# Patient Record
Sex: FEMALE | Race: WHITE | NOT HISPANIC OR LATINO | Employment: OTHER | URBAN - METROPOLITAN AREA
[De-identification: names, ages, dates, MRNs, and addresses within clinical notes are randomized per-mention and may not be internally consistent; named-entity substitution may affect disease eponyms.]

---

## 2017-05-12 ENCOUNTER — CONVERSION ENCOUNTER (OUTPATIENT)
Dept: OTHER | Facility: HOSPITAL | Age: 57
End: 2017-05-12

## 2018-02-15 ENCOUNTER — OFFICE VISIT CONVERTED (OUTPATIENT)
Dept: FAMILY MEDICINE CLINIC | Age: 58
End: 2018-02-15
Attending: NURSE PRACTITIONER

## 2018-05-18 ENCOUNTER — APPOINTMENT (OUTPATIENT)
Dept: WOMENS IMAGING | Facility: HOSPITAL | Age: 58
End: 2018-05-18

## 2018-05-18 PROCEDURE — 77067 SCR MAMMO BI INCL CAD: CPT | Performed by: RADIOLOGY

## 2018-05-18 PROCEDURE — 77063 BREAST TOMOSYNTHESIS BI: CPT | Performed by: RADIOLOGY

## 2018-06-01 ENCOUNTER — OFFICE VISIT CONVERTED (OUTPATIENT)
Dept: FAMILY MEDICINE CLINIC | Age: 58
End: 2018-06-01
Attending: NURSE PRACTITIONER

## 2018-06-09 ENCOUNTER — OFFICE VISIT CONVERTED (OUTPATIENT)
Dept: FAMILY MEDICINE CLINIC | Age: 58
End: 2018-06-09
Attending: NURSE PRACTITIONER

## 2018-09-04 ENCOUNTER — OFFICE VISIT CONVERTED (OUTPATIENT)
Dept: FAMILY MEDICINE CLINIC | Age: 58
End: 2018-09-04
Attending: NURSE PRACTITIONER

## 2019-01-09 ENCOUNTER — HOSPITAL ENCOUNTER (OUTPATIENT)
Dept: PHYSICAL THERAPY | Facility: CLINIC | Age: 59
Setting detail: RECURRING SERIES
Discharge: HOME | End: 2019-01-09
Attending: ORTHOPAEDIC SURGERY

## 2019-03-04 ENCOUNTER — OFFICE VISIT CONVERTED (OUTPATIENT)
Dept: FAMILY MEDICINE CLINIC | Age: 59
End: 2019-03-04
Attending: NURSE PRACTITIONER

## 2019-03-11 ENCOUNTER — HOSPITAL ENCOUNTER (OUTPATIENT)
Dept: OTHER | Facility: HOSPITAL | Age: 59
Discharge: HOME | End: 2019-03-11

## 2019-03-11 LAB
ALBUMIN SERPL-MCNC: 3.9 G/DL (ref 3.5–5)
ALBUMIN/GLOB SERPL: 1.6 {RATIO} (ref 1.4–2.6)
ALP SERPL-CCNC: 74 U/L (ref 53–141)
ALT SERPL-CCNC: 20 U/L (ref 10–40)
ANION GAP SERPL CALC-SCNC: 13 MMOL/L (ref 8–19)
AST SERPL-CCNC: 18 U/L (ref 15–50)
BASOPHILS # BLD MANUAL: 0.05 10*3/UL (ref 0–0.2)
BASOPHILS NFR BLD MANUAL: 1.1 % (ref 0–3)
BILIRUB SERPL-MCNC: 0.18 MG/DL (ref 0.2–1.3)
BUN SERPL-MCNC: 11 MG/DL (ref 5–25)
BUN/CREAT SERPL: 12 {RATIO} (ref 6–20)
CALCIUM SERPL-MCNC: 9.1 MG/DL (ref 8.7–10.4)
CHLORIDE SERPL-SCNC: 105 MMOL/L (ref 99–111)
CHOLEST SERPL-MCNC: 221 MG/DL (ref 107–200)
CHOLEST/HDLC SERPL: 3.6 {RATIO} (ref 3–6)
CONV CO2: 25 MMOL/L (ref 22–32)
CONV TOTAL PROTEIN: 6.3 G/DL (ref 6.3–8.2)
CREAT UR-MCNC: 0.94 MG/DL (ref 0.5–0.9)
DEPRECATED RDW RBC AUTO: 44.2 FL
EOSINOPHIL # BLD MANUAL: 0.18 10*3/UL (ref 0–0.7)
EOSINOPHIL NFR BLD MANUAL: 4 % (ref 0–7)
ERYTHROCYTE [DISTWIDTH] IN BLOOD BY AUTOMATED COUNT: 12.9 % (ref 11.5–14.5)
GFR SERPLBLD BASED ON 1.73 SQ M-ARVRAT: >60 ML/MIN/{1.73_M2}
GLOBULIN UR ELPH-MCNC: 2.4 G/DL (ref 2–3.5)
GLUCOSE SERPL-MCNC: 80 MG/DL (ref 65–99)
GRANS (ABSOLUTE): 2.01 10*3/UL (ref 2–8)
GRANS: 45.2 % (ref 30–85)
HBA1C MFR BLD: 11.6 G/DL (ref 12–16)
HCT VFR BLD AUTO: 35.5 % (ref 37–47)
HDLC SERPL-MCNC: 61 MG/DL (ref 40–60)
IMM GRANULOCYTES # BLD: 0.02 10*3/UL (ref 0–0.54)
IMM GRANULOCYTES NFR BLD: 0.4 % (ref 0–0.43)
LDLC SERPL CALC-MCNC: 132 MG/DL (ref 70–100)
LYMPHOCYTES # BLD MANUAL: 1.87 10*3/UL (ref 1–5)
LYMPHOCYTES NFR BLD MANUAL: 7.4 % (ref 3–10)
MCH RBC QN AUTO: 30.1 PG (ref 27–31)
MCHC RBC AUTO-ENTMCNC: 32.7 G/DL (ref 33–37)
MCV RBC AUTO: 92 FL (ref 81–99)
MONOCYTES # BLD AUTO: 0.33 10*3/UL (ref 0.2–1.2)
OSMOLALITY SERPL CALC.SUM OF ELEC: 286 MOSM/KG (ref 273–304)
PLATELET # BLD AUTO: 383 10*3/UL (ref 130–400)
PMV BLD AUTO: 9.5 FL (ref 7.4–10.4)
POTASSIUM SERPL-SCNC: 4 MMOL/L (ref 3.5–5.3)
RBC # BLD AUTO: 3.86 10*6/UL (ref 4.2–5.4)
SODIUM SERPL-SCNC: 139 MMOL/L (ref 135–147)
T4 FREE SERPL-MCNC: 0.9 NG/DL (ref 0.9–1.8)
TRIGL SERPL-MCNC: 139 MG/DL (ref 40–150)
TSH SERPL-ACNC: 4.11 M[IU]/L (ref 0.27–4.2)
VARIANT LYMPHS NFR BLD MANUAL: 41.9 % (ref 20–45)
VLDLC SERPL-MCNC: 28 MG/DL (ref 5–37)
WBC # BLD AUTO: 4.46 10*3/UL (ref 4.8–10.8)

## 2019-03-12 LAB
CONV ANTI MICROSOMAL AB: 19 IU/ML (ref 0–34)
THYROGLOBULIN ANTIBODY: <1 IU/ML (ref 0–0.9)

## 2019-03-20 ENCOUNTER — HOSPITAL ENCOUNTER (OUTPATIENT)
Dept: OTHER | Facility: HOSPITAL | Age: 59
Discharge: HOME | End: 2019-03-20

## 2019-03-20 LAB
FERRITIN SERPL-MCNC: 63 NG/ML (ref 10–200)
IRON SATN MFR SERPL: 25 % (ref 20–55)
IRON SERPL-MCNC: 117 UG/DL (ref 60–170)
TIBC SERPL-MCNC: 475 UG/DL (ref 245–450)
TRANSFERRIN SERPL-MCNC: 332 MG/DL (ref 250–380)

## 2019-05-21 ENCOUNTER — APPOINTMENT (OUTPATIENT)
Dept: WOMENS IMAGING | Facility: HOSPITAL | Age: 59
End: 2019-05-21

## 2019-05-21 PROCEDURE — 77063 BREAST TOMOSYNTHESIS BI: CPT | Performed by: RADIOLOGY

## 2019-05-21 PROCEDURE — 77067 SCR MAMMO BI INCL CAD: CPT | Performed by: RADIOLOGY

## 2019-08-13 ENCOUNTER — OFFICE VISIT CONVERTED (OUTPATIENT)
Dept: FAMILY MEDICINE CLINIC | Age: 59
End: 2019-08-13
Attending: NURSE PRACTITIONER

## 2020-02-21 ENCOUNTER — OFFICE VISIT CONVERTED (OUTPATIENT)
Dept: FAMILY MEDICINE CLINIC | Age: 60
End: 2020-02-21
Attending: NURSE PRACTITIONER

## 2020-03-04 ENCOUNTER — HOSPITAL ENCOUNTER (OUTPATIENT)
Dept: OTHER | Facility: HOSPITAL | Age: 60
Discharge: HOME | End: 2020-03-04
Attending: NURSE PRACTITIONER

## 2020-03-04 LAB
ALBUMIN SERPL-MCNC: 4.2 G/DL (ref 3.5–5)
ALBUMIN/GLOB SERPL: 1.6 {RATIO} (ref 1.4–2.6)
ALP SERPL-CCNC: 90 U/L (ref 43–160)
ALT SERPL-CCNC: 24 U/L (ref 10–40)
ANION GAP SERPL CALC-SCNC: 19 MMOL/L (ref 8–19)
AST SERPL-CCNC: 23 U/L (ref 15–50)
BASOPHILS # BLD MANUAL: 0.02 10*3/UL (ref 0–0.2)
BASOPHILS NFR BLD MANUAL: 0.3 % (ref 0–3)
BILIRUB SERPL-MCNC: 0.17 MG/DL (ref 0.2–1.3)
BUN SERPL-MCNC: 11 MG/DL (ref 5–25)
BUN/CREAT SERPL: 12 {RATIO} (ref 6–20)
CALCIUM SERPL-MCNC: 9.7 MG/DL (ref 8.7–10.4)
CHLORIDE SERPL-SCNC: 94 MMOL/L (ref 99–111)
CHOLEST SERPL-MCNC: 218 MG/DL (ref 107–200)
CHOLEST/HDLC SERPL: 3.6 {RATIO} (ref 3–6)
CONV CO2: 23 MMOL/L (ref 22–32)
CONV TOTAL PROTEIN: 6.9 G/DL (ref 6.3–8.2)
CREAT UR-MCNC: 0.93 MG/DL (ref 0.5–0.9)
DEPRECATED RDW RBC AUTO: 39.6 FL
EOSINOPHIL # BLD MANUAL: 0.19 10*3/UL (ref 0–0.7)
EOSINOPHIL NFR BLD MANUAL: 3.2 % (ref 0–7)
ERYTHROCYTE [DISTWIDTH] IN BLOOD BY AUTOMATED COUNT: 12.5 % (ref 11.5–14.5)
FOLATE SERPL-MCNC: 15.7 NG/ML (ref 4.8–20)
GFR SERPLBLD BASED ON 1.73 SQ M-ARVRAT: >60 ML/MIN/{1.73_M2}
GLOBULIN UR ELPH-MCNC: 2.7 G/DL (ref 2–3.5)
GLUCOSE SERPL-MCNC: 92 MG/DL (ref 65–99)
GRANS (ABSOLUTE): 2.9 10*3/UL (ref 2–8)
GRANS: 49.6 % (ref 30–85)
HBA1C MFR BLD: 12.3 G/DL (ref 12–16)
HCT VFR BLD AUTO: 36.8 % (ref 37–47)
HDLC SERPL-MCNC: 60 MG/DL (ref 40–60)
IMM GRANULOCYTES # BLD: 0.01 10*3/UL (ref 0–0.54)
IMM GRANULOCYTES NFR BLD: 0.2 % (ref 0–0.43)
IRON SATN MFR SERPL: 14 % (ref 20–55)
IRON SERPL-MCNC: 74 UG/DL (ref 60–170)
LDLC SERPL CALC-MCNC: 130 MG/DL (ref 70–100)
LYMPHOCYTES # BLD MANUAL: 2.2 10*3/UL (ref 1–5)
LYMPHOCYTES NFR BLD MANUAL: 9.1 % (ref 3–10)
MCH RBC QN AUTO: 29.1 PG (ref 27–31)
MCHC RBC AUTO-ENTMCNC: 33.4 G/DL (ref 33–37)
MCV RBC AUTO: 87 FL (ref 81–99)
MONOCYTES # BLD AUTO: 0.53 10*3/UL (ref 0.2–1.2)
OSMOLALITY SERPL CALC.SUM OF ELEC: 273 MOSM/KG (ref 273–304)
PLATELET # BLD AUTO: 370 10*3/UL (ref 130–400)
PMV BLD AUTO: 9.5 FL (ref 7.4–10.4)
POTASSIUM SERPL-SCNC: 4 MMOL/L (ref 3.5–5.3)
RBC # BLD AUTO: 4.23 10*6/UL (ref 4.2–5.4)
SODIUM SERPL-SCNC: 132 MMOL/L (ref 135–147)
TIBC SERPL-MCNC: 523 UG/DL (ref 245–450)
TRANSFERRIN SERPL-MCNC: 366 MG/DL (ref 250–380)
TRIGL SERPL-MCNC: 139 MG/DL (ref 40–150)
VARIANT LYMPHS NFR BLD MANUAL: 37.6 % (ref 20–45)
VLDLC SERPL-MCNC: 28 MG/DL (ref 5–37)
WBC # BLD AUTO: 5.85 10*3/UL (ref 4.8–10.8)

## 2020-04-04 ENCOUNTER — OFFICE VISIT CONVERTED (OUTPATIENT)
Dept: FAMILY MEDICINE CLINIC | Age: 60
End: 2020-04-04
Attending: NURSE PRACTITIONER

## 2020-04-15 ENCOUNTER — HOSPITAL ENCOUNTER (OUTPATIENT)
Dept: OTHER | Facility: HOSPITAL | Age: 60
Discharge: HOME | End: 2020-04-15
Attending: NURSE PRACTITIONER

## 2020-04-15 LAB
ANION GAP SERPL CALC-SCNC: 16 MMOL/L (ref 8–19)
BUN SERPL-MCNC: 11 MG/DL (ref 5–25)
BUN/CREAT SERPL: 11 {RATIO} (ref 6–20)
CALCIUM SERPL-MCNC: 9.5 MG/DL (ref 8.7–10.4)
CHLORIDE SERPL-SCNC: 99 MMOL/L (ref 99–111)
CONV CO2: 25 MMOL/L (ref 22–32)
CREAT UR-MCNC: 0.98 MG/DL (ref 0.5–0.9)
GFR SERPLBLD BASED ON 1.73 SQ M-ARVRAT: >60 ML/MIN/{1.73_M2}
GLUCOSE SERPL-MCNC: 86 MG/DL (ref 65–99)
OSMOLALITY SERPL CALC.SUM OF ELEC: 281 MOSM/KG (ref 273–304)
POTASSIUM SERPL-SCNC: 4.3 MMOL/L (ref 3.5–5.3)
SODIUM SERPL-SCNC: 136 MMOL/L (ref 135–147)

## 2020-09-04 ENCOUNTER — OFFICE VISIT CONVERTED (OUTPATIENT)
Dept: FAMILY MEDICINE CLINIC | Age: 60
End: 2020-09-04
Attending: NURSE PRACTITIONER

## 2020-09-04 ENCOUNTER — HOSPITAL ENCOUNTER (OUTPATIENT)
Dept: OTHER | Facility: HOSPITAL | Age: 60
Discharge: HOME | End: 2020-09-04
Attending: NURSE PRACTITIONER

## 2020-09-15 ENCOUNTER — HOSPITAL ENCOUNTER (OUTPATIENT)
Dept: OTHER | Facility: HOSPITAL | Age: 60
Discharge: HOME | End: 2020-09-15
Attending: NURSE PRACTITIONER

## 2020-09-15 LAB
ALBUMIN SERPL-MCNC: 4.2 G/DL (ref 3.5–5)
ALBUMIN/GLOB SERPL: 1.6 {RATIO} (ref 1.4–2.6)
ALP SERPL-CCNC: 91 U/L (ref 43–160)
ALT SERPL-CCNC: 22 U/L (ref 10–40)
ANION GAP SERPL CALC-SCNC: 18 MMOL/L (ref 8–19)
AST SERPL-CCNC: 23 U/L (ref 15–50)
BILIRUB SERPL-MCNC: 0.39 MG/DL (ref 0.2–1.3)
BUN SERPL-MCNC: 12 MG/DL (ref 5–25)
BUN/CREAT SERPL: 13 {RATIO} (ref 6–20)
CALCIUM SERPL-MCNC: 9.3 MG/DL (ref 8.7–10.4)
CHLORIDE SERPL-SCNC: 97 MMOL/L (ref 99–111)
CHOLEST SERPL-MCNC: 229 MG/DL (ref 107–200)
CHOLEST/HDLC SERPL: 3.7 {RATIO} (ref 3–6)
CONV CO2: 24 MMOL/L (ref 22–32)
CONV TOTAL PROTEIN: 6.8 G/DL (ref 6.3–8.2)
CREAT UR-MCNC: 0.94 MG/DL (ref 0.5–0.9)
GFR SERPLBLD BASED ON 1.73 SQ M-ARVRAT: >60 ML/MIN/{1.73_M2}
GLOBULIN UR ELPH-MCNC: 2.6 G/DL (ref 2–3.5)
GLUCOSE SERPL-MCNC: 89 MG/DL (ref 65–99)
HDLC SERPL-MCNC: 62 MG/DL (ref 40–60)
LDLC SERPL CALC-MCNC: 143 MG/DL (ref 70–100)
OSMOLALITY SERPL CALC.SUM OF ELEC: 279 MOSM/KG (ref 273–304)
POTASSIUM SERPL-SCNC: 4.2 MMOL/L (ref 3.5–5.3)
SODIUM SERPL-SCNC: 135 MMOL/L (ref 135–147)
TRIGL SERPL-MCNC: 121 MG/DL (ref 40–150)
VLDLC SERPL-MCNC: 24 MG/DL (ref 5–37)

## 2020-12-23 ENCOUNTER — OFFICE VISIT CONVERTED (OUTPATIENT)
Dept: OTOLARYNGOLOGY | Facility: CLINIC | Age: 60
End: 2020-12-23
Attending: OTOLARYNGOLOGY

## 2021-01-05 ENCOUNTER — APPOINTMENT (OUTPATIENT)
Dept: WOMENS IMAGING | Facility: HOSPITAL | Age: 61
End: 2021-01-05

## 2021-01-05 PROCEDURE — 77067 SCR MAMMO BI INCL CAD: CPT | Performed by: RADIOLOGY

## 2021-01-05 PROCEDURE — 77063 BREAST TOMOSYNTHESIS BI: CPT | Performed by: RADIOLOGY

## 2021-03-10 ENCOUNTER — OFFICE VISIT CONVERTED (OUTPATIENT)
Dept: OTOLARYNGOLOGY | Facility: CLINIC | Age: 61
End: 2021-03-10
Attending: OTOLARYNGOLOGY

## 2021-03-11 ENCOUNTER — OFFICE VISIT CONVERTED (OUTPATIENT)
Dept: FAMILY MEDICINE CLINIC | Age: 61
End: 2021-03-11
Attending: NURSE PRACTITIONER

## 2021-03-22 ENCOUNTER — BULK ORDERING (OUTPATIENT)
Dept: CASE MANAGEMENT | Facility: OTHER | Age: 61
End: 2021-03-22

## 2021-03-22 DIAGNOSIS — Z23 IMMUNIZATION DUE: ICD-10-CM

## 2021-03-30 ENCOUNTER — HOSPITAL ENCOUNTER (OUTPATIENT)
Dept: OTHER | Facility: HOSPITAL | Age: 61
Discharge: HOME | End: 2021-03-30
Attending: NURSE PRACTITIONER

## 2021-03-30 LAB
ALBUMIN SERPL-MCNC: 4.3 G/DL (ref 3.5–5)
ALBUMIN/GLOB SERPL: 1.5 {RATIO} (ref 1.4–2.6)
ALP SERPL-CCNC: 95 U/L (ref 43–160)
ALT SERPL-CCNC: 21 U/L (ref 10–40)
ANION GAP SERPL CALC-SCNC: 14 MMOL/L (ref 8–19)
AST SERPL-CCNC: 21 U/L (ref 15–50)
BASOPHILS # BLD MANUAL: 0 10*3/UL (ref 0–0.2)
BASOPHILS NFR BLD MANUAL: 0 % (ref 0–3)
BILIRUB SERPL-MCNC: 0.31 MG/DL (ref 0.2–1.3)
BUN SERPL-MCNC: 11 MG/DL (ref 5–25)
BUN/CREAT SERPL: 12 {RATIO} (ref 6–20)
CALCIUM SERPL-MCNC: 9.4 MG/DL (ref 8.7–10.4)
CHLORIDE SERPL-SCNC: 96 MMOL/L (ref 99–111)
CHOLEST SERPL-MCNC: 225 MG/DL (ref 107–200)
CHOLEST/HDLC SERPL: 3.6 {RATIO} (ref 3–6)
CONV CO2: 27 MMOL/L (ref 22–32)
CONV TOTAL PROTEIN: 7.1 G/DL (ref 6.3–8.2)
CREAT UR-MCNC: 0.91 MG/DL (ref 0.5–0.9)
DEPRECATED RDW RBC AUTO: 40.4 FL
EOSINOPHIL # BLD MANUAL: 0 10*3/UL (ref 0–0.7)
EOSINOPHIL NFR BLD MANUAL: 0 % (ref 0–7)
ERYTHROCYTE [DISTWIDTH] IN BLOOD BY AUTOMATED COUNT: 12.4 % (ref 11.5–14.5)
GFR SERPLBLD BASED ON 1.73 SQ M-ARVRAT: >60 ML/MIN/{1.73_M2}
GLOBULIN UR ELPH-MCNC: 2.8 G/DL (ref 2–3.5)
GLUCOSE SERPL-MCNC: 81 MG/DL (ref 65–99)
GRANS (ABSOLUTE): 2.06 10*3/UL (ref 2–8)
GRANS: 54.3 % (ref 30–85)
HBA1C MFR BLD: 12 G/DL (ref 12–16)
HCT VFR BLD AUTO: 35.6 % (ref 37–47)
HDLC SERPL-MCNC: 63 MG/DL (ref 40–60)
IMM GRANULOCYTES # BLD: 0.01 10*3/UL (ref 0–0.54)
IMM GRANULOCYTES NFR BLD: 0.3 % (ref 0–0.43)
IRON SATN MFR SERPL: 17 % (ref 20–55)
IRON SERPL-MCNC: 78 UG/DL (ref 60–170)
LDLC SERPL CALC-MCNC: 132 MG/DL (ref 70–100)
LYMPHOCYTES # BLD MANUAL: 1.42 10*3/UL (ref 1–5)
LYMPHOCYTES NFR BLD MANUAL: 7.9 % (ref 3–10)
MCH RBC QN AUTO: 29.6 PG (ref 27–31)
MCHC RBC AUTO-ENTMCNC: 33.7 G/DL (ref 33–37)
MCV RBC AUTO: 87.7 FL (ref 81–99)
MONOCYTES # BLD AUTO: 0.3 10*3/UL (ref 0.2–1.2)
OSMOLALITY SERPL CALC.SUM OF ELEC: 274 MOSM/KG (ref 273–304)
PLATELET # BLD AUTO: 366 10*3/UL (ref 130–400)
PMV BLD AUTO: 9.4 FL (ref 7.4–10.4)
POTASSIUM SERPL-SCNC: 4 MMOL/L (ref 3.5–5.3)
RBC # BLD AUTO: 4.06 10*6/UL (ref 4.2–5.4)
SODIUM SERPL-SCNC: 133 MMOL/L (ref 135–147)
TIBC SERPL-MCNC: 468 UG/DL (ref 245–450)
TRANSFERRIN SERPL-MCNC: 327 MG/DL (ref 250–380)
TRIGL SERPL-MCNC: 148 MG/DL (ref 40–150)
TSH SERPL-ACNC: 2.95 M[IU]/L (ref 0.27–4.2)
VARIANT LYMPHS NFR BLD MANUAL: 37.5 % (ref 20–45)
VIT B12 SERPL-MCNC: 480 PG/ML (ref 211–911)
VLDLC SERPL-MCNC: 30 MG/DL (ref 5–37)
WBC # BLD AUTO: 3.79 10*3/UL (ref 4.8–10.8)

## 2021-05-10 NOTE — H&P
History and Physical      Patient Name: Katia Spears   Patient ID: [id number]   Sex: Female   YOB: 1960    Primary Care Provider: Esha MCKEON   Referring Provider: Esha MCKEON    Visit Date: December 23, 2020    Provider: Joe Kidd MD   Location: [facility]   Location Address: [address] [place] [address]  [address] [place] [address]  [id number]   Location Phone: [phone]          Chief Complaint     1.  Allergic rhinitis    2.  Nasal obstruction    3.  Right ear fullness/pressure       History Of Present Illness  Katia Spears is a 60 year old /White female who presents to the office today as a consult from Esha MCKEON.      She presents the clinic today for evaluation of issues with right ear pressure/fullness, allergic rhinitis, and nasal obstruction issues.  She notes that this is been going on for quite some time without any significant worsening.  Several years ago she saw an outside ENT who had suggested nasal septoplasty for her issues, but she never went through with the surgery for whatever reason.  She notes that her nasal obstruction issues are worse at night, and prevent her from sleeping well due to breathing issues.  She is currently not on any nasal saline irrigations or fluticasone.    She notes that her hearing is normal, but she has had a pressure or fullness sensation on the right side only.  She denies any ear ringing.  She denies any previous history of ear infections.  She has not had a hearing test in quite some time.  She does note ear itching that is worse on the right.  She does pick at her ear, mostly at night.       Past Medical History  Anemia; Anxiety; COPD (chronic obstructive pulmonary disease); Deviated nasal septum; Ear Pain; Fibromyalgia; High risk medication use; History of tobacco abuse; HTN (hypertension); JE (obstructive sleep apnea); Sinusitis         Past Surgical History  C section;  "Cholecystectomy; Colonoscopy; D & C; Fracture Repair; LEEP         Medication List  cyclobenzaprine 10 mg oral tablet; Effexor  mg oral capsule,extended release 24hr; gabapentin 300 mg oral capsule; ibuprofen 800 mg oral tablet; lisinopril-hydrochlorothiazide 10-12.5 mg oral tablet; ProAir HFA 90 mcg/actuation inhalation HFA aerosol inhaler; simvastatin 20 mg oral tablet         Allergy List  NO KNOWN DRUG ALLERGIES         Family Medical History  Family history of cancer; Family history of heart disease; Family history of diabetes mellitus         Social History  Tobacco (Former)         Review of Systems  · Constitutional  o Admits  o : night sweats  o Denies  o : fever, weight loss  · Eyes  o Denies  o : discharge from eye, impaired vision  · HENT  o Admits  o : *See HPI  · Cardiovascular  o Denies  o : chest pain, irregular heart beats  · Respiratory  o Admits  o : shortness of breath  o Denies  o : wheezing, coughing up blood  · Gastrointestinal  o Admits  o : heartburn  o Denies  o : reflux, vomiting blood  · Genitourinary  o Denies  o : frequency  · Integument  o Admits  o : skin dryness  o Denies  o : rash  · Neurologic  o Denies  o : seizures, loss of balance, loss of consciousness, dizziness  · Endocrine  o Denies  o : cold intolerance, heat intolerance  · Heme-Lymph  o Admits  o : anemia  o Denies  o : easy bleeding      Vitals  Date Time BP Position Site L\R Cuff Size HR RR TEMP (F) WT  HT  BMI kg/m2 BSA m2 O2 Sat FR L/min FiO2        12/23/2020 01:59 PM        97.3 161lbs 4oz 5'  2\" 29.49 1.79             Physical Examination  · Constitutional  o Appearance  o : well developed, well-nourished, alert and in no acute distress, voice clear and strong  · Head and Face  o Head  o :   § Inspection  § : no deformities or lesions  o Face  o :   § Inspection  § : No facial lesions; House-Brackmann I/VI bilaterally  § Palpation  § : No TMJ crepitus nor  muscle tenderness " bilaterally  · Eyes  o Vision  o :   § Visual Fields  § : Extraocular movements are intact. No spontaneous or gaze-induced nystagmus.  o Conjunctivae  o : clear  o Sclerae  o : clear  o Pupils and Irises  o : pupils equal, round, and reactive to light.   · Ears, Nose, Mouth and Throat  o Ears  o :   § External Ears  § : appearance within normal limits, no lesions present  § Otoscopic Examination  § : tympanic membrane appearance within normal limits bilaterally without perforations, well-aerated middle ears  § Hearing  § : intact to conversational voice both ears  o Nose  o :   § External Nose  § : appearance normal  § Intranasal Exam  § : mucosa congested appearing, vestibules normal, no intranasal lesions present, septum midline, sinuses non tender to percussion  o Oral Cavity  o :   § Oral Mucosa  § : oral mucosa normal without pallor or cyanosis  § Lips  § : lip appearance normal  § Teeth  § : normal dentition for age  § Gums  § : gums pink, non-swollen, no bleeding present  § Tongue  § : tongue appearance normal; normal mobility  § Palate  § : hard palate normal, soft palate appearance normal with symmetric mobility  o Throat  o :   § Oropharynx  § : no inflammation or lesions present, tonsils within normal limits  § Hypopharynx  § : appearance within normal limits, superior epiglottis within normal limits  § Larynx  § : appearance within normal limits, vocal cords within normal limits, no lesions present  · Neck  o Inspection/Palpation  o : normal appearance, no masses or tenderness, trachea midline; thyroid size normal, nontender, no nodules or masses present on palpation  · Respiratory  o Respiratory Effort  o : breathing unlabored  o Inspection of Chest  o : normal appearance, no retractions  · Cardiovascular  o Heart  o : regular rate and rhythm  · Lymphatic  o Neck  o : no lymphadenopathy present  o Supraclavicular Nodes  o : no lymphadenopathy present  o Preauricular Nodes  o : no lymphadenopathy  present  · Skin and Subcutaneous Tissue  o General Inspection  o : Regarding face and neck - there are no rashes present, no lesions present, and no areas of discoloration  · Neurologic  o Cranial Nerves  o : cranial nerves II-XII are grossly intact bilaterally  o Gait and Station  o : normal gait, able to stand without diffculty  · Psychiatric  o Judgement and Insight  o : judgment and insight intact  o Mood and Affect  o : mood normal, affect appropriate          Assessment  · Allergic rhinitis     477.9/J30.9  · Nasal obstruction     478.19/J34.89  · Ear discomfort, right     388.70/H92.01      Plan  · Orders  o Audiometry, pure-tone (threshold); air and bone (00726) - 388.70/H92.01 - 03/10/2021  o Tympanogram (Impedance Testing) [facility] ([address]172) - 477.9/J30.9, 388.70/H92.01 - 03/10/2021  · Medications  o Medications have been Reconciled  o Transition of Care or Provider Policy  · Instructions  o She presents the clinic today for evaluation of issues with right ear pressure/fullness, allergic rhinitis, and nasal obstruction issues. She notes that this is been going on for quite some time without any significant worsening. Several years ago she saw an outside ENT who had suggested nasal septoplasty for her issues, but she never went through with the surgery for whatever reason. She notes that her nasal obstruction issues are worse at night, and prevent her from sleeping well due to breathing issues. She is currently not on any nasal saline irrigations or fluticasone. Examination revealed congested nasal mucosa and hypertrophic inferior turbinates. I recommended nasal saline irrigations and use of her Flonase nightly after the washes.She notes that her hearing is normal, but she has had a pressure or fullness sensation on the right side only. She denies any ear ringing. She denies any previous history of ear infections. She has not had a hearing test in quite some time. She does note ear itching that is worse on the  right. She does pick at her ear, mostly at night. Examination revealed normal-appearing ear. I did order an audiogram and tympanogram and we will see her back in the clinic to discuss the results and further management for this.  o Electronically Identified Patient Education Materials Provided Electronically            Electronically Signed by: Joe Kidd MD -Author on March 10, 2021 01:38:48 PM

## 2021-05-13 ENCOUNTER — HOSPITAL ENCOUNTER (OUTPATIENT)
Dept: OTHER | Facility: HOSPITAL | Age: 61
Discharge: HOME | End: 2021-05-13
Attending: NURSE PRACTITIONER

## 2021-05-13 LAB
ANION GAP SERPL CALC-SCNC: 15 MMOL/L (ref 8–19)
BUN SERPL-MCNC: 13 MG/DL (ref 5–25)
BUN/CREAT SERPL: 15 {RATIO} (ref 6–20)
CALCIUM SERPL-MCNC: 9.5 MG/DL (ref 8.7–10.4)
CHLORIDE SERPL-SCNC: 97 MMOL/L (ref 99–111)
CONV CO2: 28 MMOL/L (ref 22–32)
CREAT UR-MCNC: 0.89 MG/DL (ref 0.5–0.9)
GFR SERPLBLD BASED ON 1.73 SQ M-ARVRAT: >60 ML/MIN/{1.73_M2}
GLUCOSE SERPL-MCNC: 91 MG/DL (ref 65–99)
OSMOLALITY SERPL CALC.SUM OF ELEC: 282 MOSM/KG (ref 273–304)
POTASSIUM SERPL-SCNC: 4.3 MMOL/L (ref 3.5–5.3)
SODIUM SERPL-SCNC: 136 MMOL/L (ref 135–147)

## 2021-05-14 VITALS — HEIGHT: 62 IN | WEIGHT: 163 LBS | BODY MASS INDEX: 30 KG/M2 | TEMPERATURE: 97.3 F | RESPIRATION RATE: 16 BRPM

## 2021-05-14 VITALS — TEMPERATURE: 97.3 F | WEIGHT: 161.25 LBS | HEIGHT: 62 IN | BODY MASS INDEX: 29.67 KG/M2

## 2021-05-14 NOTE — PROGRESS NOTES
Progress Note      Patient Name: Katia Spears   Patient ID: [id number]   Sex: Female   YOB: 1960    Primary Care Provider: Esha MCKEON   Referring Provider: Esha MCKEON    Visit Date: March 10, 2021    Provider: Joe Kidd MD   Location: [facility]   Location Address: [address] [place] [address]  [address] [place] [address]  [id number]   Location Phone: [phone]          Chief Complaint     1.  Right ear pressure/itching    2.  Allergic rhinitis    3.  Nasal obstruction       History Of Present Illness  Katia Spears is a 60 year old /White female who presents to the office today for a follow-up visit.      She presents the clinic today for follow-up regarding issues with nasal congestion and obstruction, as well as right ear pressure and itching.  She has not had any significant improvement, and unfortunately informed her that she was not able to tolerate nasal saline irrigations and is not using her Flonase as recommended at the previous clinic visit.  She continues to have difficulty breathing through her nose which is worse at night.  It seems to be quite bothersome to her.    She did have an audiogram performed which shows borderline normal to mild sensorineural hearing loss in both ears but no significant issues with ear pressure seen on tympanogram, which was normal on both sides.  Word discrimination scores are very good.  I informed her that it does not seem that eustachian tube dysfunction is the cause of her ear pressure symptoms.  She informs me that she has had more itching lately and has been picking at her ear more frequently.       Past Medical History  Anemia; Anxiety; COPD (chronic obstructive pulmonary disease); Deviated nasal septum; Ear Pain; Fibromyalgia; High risk medication use; History of tobacco abuse; HTN (hypertension); JE (obstructive sleep apnea); Sinusitis         Past Surgical History  C section;  "Cholecystectomy; Colonoscopy; D & C; Fracture Repair; LEEP         Medication List  cyclobenzaprine 10 mg oral tablet; Effexor  mg oral capsule,extended release 24hr; gabapentin 300 mg oral capsule; ibuprofen 800 mg oral tablet; lisinopril-hydrochlorothiazide 10-12.5 mg oral tablet; ProAir HFA 90 mcg/actuation inhalation HFA aerosol inhaler; simvastatin 20 mg oral tablet         Allergy List  NO KNOWN DRUG ALLERGIES       Allergies Reconciled  Family Medical History  Family history of cancer; Family history of heart disease; Family history of diabetes mellitus         Social History  Tobacco (Former)         Review of Systems  · Constitutional  o Denies  o : fever, night sweats, weight loss  · Eyes  o Denies  o : discharge from eye, impaired vision  · HENT  o Admits  o : *See HPI  · Cardiovascular  o Denies  o : chest pain, irregular heart beats  · Respiratory  o Admits  o : shortness of breath  o Denies  o : wheezing, coughing up blood  · Gastrointestinal  o Admits  o : heartburn  o Denies  o : reflux, vomiting blood  · Genitourinary  o Denies  o : frequency of urine  · Integument  o Admits  o : skin dryness  o Denies  o : rash  · Neurologic  o Denies  o : seizures, loss of balance, loss of consciousness, dizziness  · Endocrine  o Denies  o : cold intolerance, heat intolerance  · Heme-Lymph  o Denies  o : easy bleeding, anemia      Vitals  Date Time BP Position Site L\R Cuff Size HR RR TEMP (F) WT  HT  BMI kg/m2 BSA m2 O2 Sat FR L/min FiO2 HC       03/10/2021 12:52 PM       16 97.3 163lbs 0oz 5'  2\" 29.81 1.8             Physical Examination  · Constitutional  o Appearance  o : well developed, well-nourished, alert and in no acute distress, voice clear and strong  · Head and Face  o Head  o :   § Inspection  § : no deformities or lesions  o Face  o :   § Inspection  § : No facial lesions; House-Brackmann I/VI bilaterally  § Palpation  § : No TMJ crepitus nor  muscle tenderness " bilaterally  · Eyes  o Vision  o :   § Visual Fields  § : Extraocular movements are intact. No spontaneous or gaze-induced nystagmus.  o Conjunctivae  o : clear  o Sclerae  o : clear  o Pupils and Irises  o : pupils equal, round, and reactive to light.   · Ears, Nose, Mouth and Throat  o Ears  o :   § External Ears  § : appearance within normal limits, no lesions present  § Otoscopic Examination  § : tympanic membrane appearance within normal limits bilaterally without perforations, well-aerated middle ears  § Hearing  § : intact to conversational voice both ears  o Nose  o :   § External Nose  § : appearance normal  § Intranasal Exam  § : mucosa congested appearing, vestibules normal, no intranasal lesions present, septum midline, sinuses non tender to percussion  o Oral Cavity  o :   § Oral Mucosa  § : oral mucosa normal without pallor or cyanosis  § Lips  § : lip appearance normal  § Teeth  § : normal dentition for age  § Gums  § : gums pink, non-swollen, no bleeding present  § Tongue  § : tongue appearance normal; normal mobility  § Palate  § : hard palate normal, soft palate appearance normal with symmetric mobility  o Throat  o :   § Oropharynx  § : no inflammation or lesions present, tonsils within normal limits  § Hypopharynx  § : appearance within normal limits, superior epiglottis within normal limits  § Larynx  § : appearance within normal limits, vocal cords within normal limits, no lesions present  · Neck  o Inspection/Palpation  o : normal appearance, no masses or tenderness, trachea midline; thyroid size normal, nontender, no nodules or masses present on palpation  · Respiratory  o Respiratory Effort  o : breathing unlabored  o Inspection of Chest  o : normal appearance, no retractions  · Cardiovascular  o Heart  o : regular rate and rhythm  · Lymphatic  o Neck  o : no lymphadenopathy present  o Supraclavicular Nodes  o : no lymphadenopathy present  o Preauricular Nodes  o : no lymphadenopathy  present  · Skin and Subcutaneous Tissue  o General Inspection  o : Regarding face and neck - there are no rashes present, no lesions present, and no areas of discoloration  · Neurologic  o Cranial Nerves  o : cranial nerves II-XII are grossly intact bilaterally  o Gait and Station  o : normal gait, able to stand without diffculty  · Psychiatric  o Judgement and Insight  o : judgment and insight intact  o Mood and Affect  o : mood normal, affect appropriate          Assessment  · Allergic rhinitis     477.9/J30.9  · Nasal obstruction     478.19/J34.89  · Ear itch     698.9/L29.9  · Ear discomfort, right     388.70/H92.01      Plan  · Medications  o betamethasone, augmented 0.05 % topical ointment   SIG: apply to affected part of ear and ear canals BID   DISP: (1) Tube with 0 refills  Prescribed on 03/10/2021     o Medications have been Reconciled  o Transition of Care or Provider Policy  · Instructions  o She presents the clinic today for follow-up regarding issues with nasal congestion and obstruction, as well as right ear pressure and itching. She has not had any significant improvement, and unfortunately informed her that she was not able to tolerate nasal saline irrigations and is not using her Flonase as recommended at the previous clinic visit. She continues to have difficulty breathing through her nose which is worse at night. It seems to be quite bothersome to her. Examination revealed continued nasal congestion. I discussed the importance of nasal saline irrigations and Flonase, and she will try to get used to this. We also discussed septoplasty and turbinate reductions as an option, and she will think about this further.She did have an audiogram performed which shows borderline normal to mild sensorineural hearing loss in both ears but no significant issues with ear pressure seen on tympanogram, which was normal on both sides. Word discrimination scores are very good. I informed her that it does not seem  that eustachian tube dysfunction is the cause of her ear pressure symptoms. She informs me that she has had more itching lately and has been picking at her ear more frequently. Ear canals appear slightly dry, but there is no erythema or infection. I prescribed betamethasone ointment for and will have her use this on a short-term basis. I have instructed her not to pick at the ear. Should there be any worsening of asked her to contact me for further evaluation.  o Electronically Identified Patient Education Materials Provided Electronically  · Correspondence  o ENT Letter to Referring MD (Esha MCKEON) - 03/10/2021            Electronically Signed by: Joe Kidd MD -Author on March 10, 2021 01:43:53 PM

## 2021-05-18 NOTE — PROGRESS NOTES
Katia Spears  1960     Office/Outpatient Visit    Visit Date: Fri, Sep 4, 2020 04:00 pm    Provider: Esha Galeana N.P. (Assistant: Luz Rainey MA)    Location: [facility]        Electronically signed by Esha Galeana N.P. on  09/07/2020 07:08:50 PM                             Subjective:        CC: Mrs. Spears is a 60 year old White female.  This is a follow-up visit.          HPI:           Mrs. Spears presents with essential (primary) hypertension.  Dx with essential (primary) hypertension; her current cardiac medication regimen includes lisinopril/hctz.  Compliance with treatment has been good; she takes her medication as directed.  She is tolerating the medication well without side effects.  She did not bring her blood pressure diary, but says that pressures have been well controlled.            Concerning dysthymic disorder, patient presents with generalized anxiety disorder.  stable on effexor.  denies side effects.  requests refills.            Additionally, she presents with history of fibromyalgia.  other details: Concerning fibromyalgia, other details: stable on gabapentin.  denies side effects.  requests refills..    has seen [facility] rheumatology in the past.  would like to see dr auguste or dr hernandez for opinion..            Dx with mixed hyperlipidemia; in regard to the mixed hyperlipidemia, current treatment includes Zocor.  Compliance with treatment has been fair; she skips some medication doses.  She denies experiencing any hypercholesterolemia related symptoms.            Dx with acute vaginitis; the presenting complaint is vaginal irritation and discharge.  These have been present for the past one week.  She denies associated symptoms of vaginal odor, fever, abdominal pain and pelvic pain.  Previous treatment has included oral metronidazole.  hx bacterial vaginosis recurrent tx by GYN.  either has BV or UTI.            states she has left deviated nasal septum  for as long as she can recall.  would like to see ENT dr mcleod for evaluation.      ROS:     CONSTITUTIONAL:  Negative for chills, fatigue, fever, and weight change.      CARDIOVASCULAR:  Negative for chest pain, palpitations, tachycardia, orthopnea, and edema.      RESPIRATORY:  Negative for cough, dyspnea, and hemoptysis.      GASTROINTESTINAL:  Negative for abdominal pain, heartburn, constipation, diarrhea, and stool changes.      GENITOURINARY:  Positive for dysuria and vaginal discharge.   Negative for post-menopausal vaginal bleeding.      MUSCULOSKELETAL:  Positive for myalgias (stable).      NEUROLOGICAL:  Negative for dizziness, headaches, paresthesias, and weakness.      PSYCHIATRIC:  Positive for anxiety ( (stable) ).   Negative for sleep disturbance or suicidal thoughts.          Past Medical History / Family History / Social History:         Last Reviewed on 2020 11:18 AM by Clementina Aragon    Past Medical History:             GYNECOLOGICAL HISTORY:     miscarriage 4         CURRENT MEDICAL PROVIDERS:    Obstetrician/Gynecologist    Rheumatologist         PREVENTIVE HEALTH MAINTENANCE             BONE DENSITY: was last done  with the following abnormality noted-- osteopenia and osteoprosis     COLORECTAL CANCER SCREENING: Up to date (colonoscopy q10y; sigmoidoscopy q5y; Cologuard q3y) was last done , Results are in chart; colonoscopy with normal results     EYE EXAM: was last done - Dr. Fisher     MAMMOGRAM: Done within last 2 years and results in are chart was last done may 2019 with normal results     PAP SMEAR: was last done  with normal results         Surgical History:         Cholecystectomy    : X 2;     Dilation and Curettage: multiple;     Fracture Repair: RIGHT WRIST;;; 2019;     Procedures:    Colonoscopy ( --NEG;; )    LEEP procedure (  )    Treadmill stress test ( --NEG;; )         Family History:     Father:  at age 38; Cause of death  was lung cancer     Mother:  at age 54; Cause of death was Shy-Draegers/Neurological Disorder     Brother(s): 3 brother(s) total;  Hyperlipidemia;  Schizophrenia     Sister(s): 5 sister(s) total;  Hyperlipidemia;  Type 2 Diabetes; thyroid disorder;  Rheumatoid Arthritis     Son(s): # sons total son(s) total;  Type 1 Diabetes     Positive for Coronary Artery Disease.          Social History:     Occupation: Homemaker     Marital Status:      Children: 2 children (ages 29/11 )         Tobacco/Alcohol/Supplements:     Last Reviewed on 2020 11:18 AM by Clementina Aragon    Tobacco: She has a past history of cigarette smoking; quit date:  .          Alcohol: Frequency: Socially     Caffeine:  She admits to consuming caffeine via coffee ( 1 serving per day ).          Substance Abuse History:     Last Reviewed on 2020 11:18 AM by Clementina Aragon    None         Mental Health History:     Last Reviewed on 2020 11:18 AM by Clementina Aragon        Communicable Diseases (eg STDs):     Last Reviewed on 3/04/2019 09:08 AM by Jennie Andre        Current Problems:     Last Reviewed on 2020 11:18 AM by Clementina Aragon    Dysthymic disorder    Fibromyalgia    Obstructive sleep apnea (adult) (pediatric)    Generalized anxiety disorder    Vitamin D deficiency, unspecified    Essential (primary) hypertension    Chronic sinusitis, unspecified    Chronic obstructive pulmonary disease, unspecified    Anemia, unspecified    Other long term (current) drug therapy    Mixed hyperlipidemia    Hypo-osmolality and hyponatremia    Acute vaginitis    Other difficulties with micturition        Immunizations:     Havrix -adult dose (HepA) 2018    Hep A, adult dose 2018    Fluzone Quadrivalent (3+ years) 10/8/2018    Influenza, split virus pf (3+ years dose) 2017    Influenza quadrivalent, recombinant, pf 10/5/2019        Allergies:     Last Reviewed on 2020 04:00 PM by Luz Rainey     No Known Allergies.        Current Medications:     Last Reviewed on 9/04/2020 04:00 PM by Luz Rainey    Advair HFA  [BID]    baclofen     Ibuprofen 800mg Tablet [Take 1 tablet(s) by mouth q8h prn]    Neurontin 300 mg oral capsule [One tablet PO TID PRN]    Effexor  mg oral Capsule, Extended Release 24 hr [Take 1 capsule(s) by mouth daily]    Simvastatin 20 mg oral tablet [1 tab daily]    OTC Multivitamin Take one tablet once daily prn          lisinopriL-hydrochlorothiazide 10-12.5 mg oral tablet [one a day]    cyclobenzaprine 10 mg oral tablet [PRN]    ProAir HFA 90 mcg/actuation Inhalation HFA Aerosol Inhaler [prn]        Objective:        Vitals:         Current: 9/4/2020 4:13:44 PM    Ht:  5 ft, 1.5 in;  Wt: 96.7 lbs;  BMI: 18.0T: 165.6 F (temporal);  BP: 132/85 mm Hg (left arm, sitting);  P: 89 bpm (left arm (BP Cuff), sitting);  sCr: 0.98 mg/dL;  GFR: 47.70        Exams:     PHYSICAL EXAM:     GENERAL: no apparent distress;     NECK: thyroid is non-palpable;     RESPIRATORY: normal respiratory rate and pattern with no distress; normal breath sounds with no rales, rhonchi, wheezes or rubs;     CARDIOVASCULAR: normal rate; rhythm is regular;  no edema;     GENITOURINARY: external genitalia: normal without lesions or urethral abnormalities;;  vagina: normal with good pelvic support and no lesions or discharge;     BREAST/INTEGUMENT: declines breast exam;     MUSCULOSKELETAL:  Normal range of motion, strength and tone;     NEUROLOGIC: mental status: alert and oriented x 3; GROSSLY INTACT     PSYCHIATRIC:  appropriate affect and demeanor; normal speech pattern; grossly normal memory;         Lab/Test Results:         Date and time of last pill: gabapentin 9-3-2020 @ 12pm /al (09/04/2020),     Glucose, Urine: Neg (09/04/2020),     Bilirubin, urine: Negative (09/04/2020),     Ketones, Urine Strip: Negative (09/04/2020),     Specific Gravity, urine: 1.020 (09/04/2020),     Blood in Urine: trace  (09/04/2020),     pH, urine: 7.0 (09/04/2020),     Protein Urine QL: negative (09/04/2020),     Urobilinogen, urine: 0.2 E.U./dL (09/04/2020),     Nitrite, Urine: Negative (09/04/2020),     Leukoctyes, urine: Negative (09/04/2020),     Appearance: Clear (09/04/2020),     collection source: Clean-catch (09/04/2020),     Color: Yellow (09/04/2020),     Performed by:: al (09/04/2020),             Assessment:         R39.198   Other difficulties with micturition       Z79.899   Other long term (current) drug therapy       I10   Essential (primary) hypertension       F34.1   Dysthymic disorder       M79.7   Fibromyalgia       E78.2   Mixed hyperlipidemia       N76.0   Acute vaginitis       J34.2   Deviated nasal septum       Z12.31   Encounter for screening mammogram for malignant neoplasm of breast           ORDERS:         Meds Prescribed:       [Refilled] lisinopriL-hydrochlorothiazide 10-12.5 mg oral tablet [one a day], #90 (ninety) tablets, Refills: 1 (one)       [Refilled] Effexor  mg oral Capsule, Extended Release 24 hr [Take 1 capsule(s) by mouth daily], #90 (ninety) capsules, Refills: 1 (one)       [Refilled] Simvastatin 20 mg oral tablet [1 tab daily], #90 (ninety) tablets, Refills: 1 (one)       [Refilled] Neurontin 300 mg oral capsule [One tablet PO TID PRN], #180 (one hundred and eighty) capsules, Refills: 1 (one)         Radiology/Test Orders:       [id number]  Screening digital breast tomosynthesis bi  (Send-Out)            3017F  Colorectal CA screen results documented and reviewed (PV)  (In-House)              Lab Orders:       [id number]  Urinalysis, automated, without microscopy  (In-House)            13195  Drug test prsmv read direct optical obs pr date  (In-House)            06859  VAGSC - [facility] VAG SCREEN CANDIDA,JEFFERY, & TRICH [id number]  (Send-Out)            73633  HTNLP - [facility] CMP AND LIPID: [id number], [id number]  (Send-Out)              Procedures Ordered:       REFER  Referral to Specialist or Other Facility   (Send-Out)            REFER  Referral to Specialist or Other Facility  (Send-Out)              Other Orders:         Screening mammogram results documented  (Send-Out)                      Plan:         Other difficulties with micturition        RECOMMENDATIONS given include: reassure UA is normal.  MIPS Screening mammomgram done within last 2 years and results in are chart Colorectal Cancer Screening is up to date and the results are in the chart           Orders:       80911  Urinalysis, automated, without microscopy  (In-House)              Screening mammogram results documented  (Send-Out)            3017F  Colorectal CA screen results documented and reviewed (PV)  (In-House)              Other long term (current) drug therapy          Orders:       17932  Drug test prsmv read direct optical obs pr date  (In-House)              Essential (primary) hypertension    LABORATORY:  Labs ordered to be performed today include HTN/Lipid Panel: CMP, Lipid.            Prescriptions:       [Refilled] lisinopriL-hydrochlorothiazide 10-12.5 mg oral tablet [one a day], #90 (ninety) tablets, Refills: 1 (one)           Orders:       19558  HTN - [facility] CMP AND LIPID: [id number], [id number]  (Send-Out)              Dysthymic disorder          Prescriptions:       [Refilled] Effexor  mg oral Capsule, Extended Release 24 hr [Take 1 capsule(s) by mouth daily], #90 (ninety) capsules, Refills: 1 (one)         Fibromyalgia        REFERRALS:  Referral initiated to a rheumatologist ( requests dr auguste or dr hernandez ).            Prescriptions:       [Refilled] Neurontin 300 mg oral capsule [One tablet PO TID PRN], #180 (one hundred and eighty) capsules, Refills: 1 (one)           Orders:       REFER  Referral to Specialist or Other Facility  (Send-Out)              Mixed hyperlipidemia          Prescriptions:       [Refilled] Simvastatin 20 mg oral tablet [1 tab daily], #90 (ninety) tablets, Refills: 1 (one)         Acute vaginitis           Orders:       06131  VAGSC - [facility] VAG SCREEN CANDIDA,JEFFERY, & TRICH [id number]  (Send-Out)              Deviated nasal septum        REFERRALS:  Referral initiated to an E/N/T ( Dr. Christo Kidd, [facility] ).            Orders:       REFER  Referral to Specialist or Other Facility  (Send-Out)              Encounter for screening mammogram for malignant neoplasm of breast          Orders:       41864  Screening digital breast tomosynthesis bi  (Send-Out)                  Charge Capture:         Primary Diagnosis:     R39.198  Other difficulties with micturition           Orders:      38384  Office/outpatient visit; established patient, level 4  (In-House)            38751  Urinalysis, automated, without microscopy  (In-House)            3017F  Colorectal CA screen results documented and reviewed (PV)  (In-House)              Z79.899  Other long term (current) drug therapy           Orders:      28993  Drug test prsmv read direct optical obs pr date  (In-House)              I10  Essential (primary) hypertension     F34.1  Dysthymic disorder     M79.7  Fibromyalgia     E78.2  Mixed hyperlipidemia     N76.0  Acute vaginitis     J34.2  Deviated nasal septum     Z12.31  Encounter for screening mammogram for malignant neoplasm of breast

## 2021-05-18 NOTE — PROGRESS NOTES
Katia Spears 1960     Office/Outpatient Visit    Visit Date: Thu, Feb 15, 2018 11:03 am    Provider: Jennie Andre N.P. (Assistant: Dorothea Brown RN)    Location: [facility]        Electronically signed by Jennie Andre N.P. on  02/15/2018 07:15:56 PM                             SUBJECTIVE:        CC:     Mrs. Spears is a 57 year old White female.  Medication refill;         HPI:         Patient to be evaluated for fibromyalgia.  Other details: currently takes gabepentin for symptoms and reports effectiveness..          With regard to the shoulder pain, she complains of right shoulder pain.  The location of the pain is anterior, posterior, and diffuse.  It radiates to the arm.  She describes it as constant and aching.      ROS:     CONSTITUTIONAL:  Negative for chills, fatigue, fever, and weight change.      EYES:  Negative for blurred vision.      CARDIOVASCULAR:  Negative for chest pain, orthopnea, paroxysmal nocturnal dyspnea and pedal edema.      RESPIRATORY:  Negative for dyspnea.      GASTROINTESTINAL:  Negative for abdominal pain, constipation, diarrhea, nausea and vomiting.      NEUROLOGICAL:  Negative for dizziness, headaches, paresthesias, and weakness.      PSYCHIATRIC:  Negative for anxiety, depression, and sleep disturbances.          PMH/FMH/SH:     Last Reviewed on 2/15/2018 11:13 AM by Jennie Andre    Past Medical History:             GYNECOLOGICAL HISTORY:     miscarriage 4         CURRENT MEDICAL PROVIDERS:    Obstetrician/Gynecologist    Rheumatologist         PREVENTIVE HEALTH MAINTENANCE             BONE DENSITY: was last done  with the following abnormality noted-- osteopenia and osteoprosis     COLONOSCOPY: with normal results     MAMMOGRAM: was last done  with normal results     PAP SMEAR: was last done 2017 with normal results         Surgical History:         Cholecystectomy    : X 2;     Dilation and Curettage: multiple;      Procedures:    Colonoscopy ( --NEG;; )    LEEP procedure (  )    Treadmill stress test ( --NEG;; )         Family History:     Father:  at age 38; Cause of death was lung cancer     Mother:  at age 54; Cause of death was Shy-Draegers/Neurological Disorder     Brother(s): 3 brother(s) total;  Hyperlipidemia;  Schizophrenia     Sister(s): 5 sister(s) total;  Hyperlipidemia;  Type 2 Diabetes; thyroid disorder;  Rheumatoid Arthritis     Son(s): # sons total son(s) total;  Type 1 Diabetes     Positive for Coronary Artery Disease.          Social History:     Occupation: Homemaker     Marital Status:      Children: 2 children (ages 29/11 )         Tobacco/Alcohol/Supplements:     Last Reviewed on 2/15/2018 11:13 AM by Jennie Andre    Tobacco: She has a past history of cigarette smoking; quit date:  .          Alcohol: Frequency: Socially     Caffeine:  She admits to consuming caffeine via coffee ( 1 serving per day ).          Substance Abuse History:     Last Reviewed on 2/15/2018 11:13 AM by Jennie Andre    None         Mental Health History:     Last Reviewed on 2/15/2018 11:13 AM by Jennie Andre        Communicable Diseases (eg STDs):     Last Reviewed on 2/15/2018 11:13 AM by Jennie Andre            Current Problems:     Last Reviewed on 2/15/2018 11:13 AM by Jennie Andre    Hyperlipidemia     Vitamin D deficiency, unspecified     Chronic sinusitis, unspecified     Hypertension     Anxiety     Obstructive sleep apnea     Chronic venous insufficiency     Fibromyalgia     Generalized osteoarthritis, multiple sites     GERD     Chronic low back pain     Postmenopausal osteoporosis     Post-menopausal state     Depression responsive to treatment         Immunizations:     Influenza, split virus pf (3+ years dose) 2017         Allergies:     Last Reviewed on 2/15/2018 11:13 AM by Jennie Andre      No Known Drug Allergies.         Current Medications:      Last Reviewed on 2/15/2018 11:13 AM by Jennie Andre    Neurontin 300mg Capsules ONE TID     Effexor XR 37.5mg Capsules, Extended Release Take 1 capsule(s) by mouth daily     Hydrochlorothiazide (HCTZ) 25mg Tablet 1 tab daily     Simvastatin 20mg Tablet 1 tab daily     Ibuprofen 800mg Tablet Take 1 tablet(s) by mouth q8h prn     Baclofen 20mg Tablet Take 1 tablet(s) by mouth tid PRN     Vitamin D3 2,000units qd         OBJECTIVE:        Vitals:         Current: 2/15/2018 11:06:12 AM    Ht:  5 ft, 1.5 in;  Wt: 175 lbs;  BMI: 32.5    T: 97.2 F (oral);  BP: 131/81 mm Hg (right arm, sitting);  P: 88 bpm (right arm (BP Cuff), sitting);  sCr: 0.82 mg/dL;  GFR: 76.09        Exams:     PHYSICAL EXAM:     GENERAL: vital signs recorded - well developed, well nourished;  no apparent distress;     EYES: extraocular movements intact; conjunctiva and cornea are normal; PERRLA;     E/N/T: EARS: both TMs are have fluid behind them and scarred;     NECK: range of motion is normal; thyroid is non-palpable;     RESPIRATORY: normal respiratory rate and pattern with no distress; normal breath sounds with no rales, rhonchi, wheezes or rubs;     CARDIOVASCULAR: normal rate; rhythm is regular;  no systolic murmur; no edema;     MUSCULOSKELETAL: decreased range of motion noted in: right shoulder flexion, extension, and adduction;  pain with range of motion in: right shoulder;     NEUROLOGICAL:  cranial nerves, motor and sensory function, reflexes, gait and coordination are all intact;     PSYCHIATRIC:  appropriate affect and demeanor; normal speech pattern; grossly normal memory;         Lab/Test Results:             Urine temperature:  confirmed (02/15/2018),     All urine drug screen levels confirmed negative:  yes (02/15/2018),     Date and time of last pill:  Gabapention 2/14/18@1030pm (02/15/2018),     Performed by:  atc (02/15/2018),     Collection Time:  1126am (02/15/2018),             ASSESSMENT:           729.1   M79.7   Fibromyalgia              DDx:     719.41   M25.511  Shoulder pain              DDx:         ORDERS:         Meds Prescribed:       Refill of: Neurontin (Gabapentin) 300mg Capsules One tablet PO TID PRN  #60 (Sixty) capsule(s) Refills: 2         Lab Orders:       [id number]  Drug test prsmv qual dir optical obs per day  (In-House)           Procedures Ordered:       REFER  Referral to Specialist or Other Facility  (Send-Out)                   PLAN:          Fibromyalgia     LABORATORY:  Labs ordered to be performed today include Drug screen.            Prescriptions:       Refill of: Neurontin (Gabapentin) 300mg Capsules One tablet PO TID PRN  #60 (Sixty) capsule(s) Refills: 2           Orders:       [id number]  Drug test prsmv qual dir optical obs per day  (In-House)            Shoulder pain will try PT, if no results, may consider MRI         REFERRALS:  Referral initiated to physical therapy ( [facility] ).            Orders:       REFER  Referral to Specialist or Other Facility  (Send-Out)               CHARGE CAPTURE:           Primary Diagnosis:     729.1 Fibromyalgia            M79.7    Fibromyalgia              Orders:          78569   Office/outpatient visit; established patient, level 4  (In-House)             41927   Drug test prsmv qual dir optical obs per day  (In-House)           719.41 Shoulder pain            M25.511    Pain in right shoulder

## 2021-05-18 NOTE — PROGRESS NOTES
Katia Spears  1960     Office/Outpatient Visit    Visit Date: Sat 10:44 am    Provider: Clementina Aragon N.P. (Assistant: Sima Roque LPN)    Location: [facility]        Electronically signed by Clementina Aragon N.P. on  2020 11:27:02 AM                             Subjective:        CC: Mrs. Spears is a 59 year old White female.  She presents with cold symptoms.          HPI: 58 y/o female presenting for TELEHEALTH vist c/o  sinus pressure, headache x 1 week . Pt has frequent hx of sinus infections. She has a deviated septum. She takes daily allergy medication. She has taken mucinex and using saline nasal spray. No known ill contacts. Medical history reviewed.    ROS:     CONSTITUTIONAL:  Positive for chills.   Negative for fatigue, fever or night sweats.      EYES:  Negative for blurred vision and eye drainage.      E/N/T:  Positive for ear pain, nasal congestion and sinus pressure.   Negative for diminished hearing or sore throat.      CARDIOVASCULAR:  Negative for chest pain, palpitations and pedal edema.      RESPIRATORY:  Negative for recent cough and dyspnea.      GASTROINTESTINAL:  Negative for abdominal pain, diarrhea, nausea and vomiting.      MUSCULOSKELETAL:  Negative for myalgias.      INTEGUMENTARY/BREAST:  Negative for rash.      NEUROLOGICAL:  Negative for dizziness, paresthesias and weakness.      PSYCHIATRIC:  Negative for anxiety, depression, sleep disturbance and suicidal thoughts.          Past Medical History / Family History / Social History:         Last Reviewed on 2020 11:18 AM by Clementina Aragon    Past Medical History:             GYNECOLOGICAL HISTORY:     miscarriage 4         CURRENT MEDICAL PROVIDERS:    Obstetrician/Gynecologist    Rheumatologist         PREVENTIVE HEALTH MAINTENANCE             BONE DENSITY: was last done  with the following abnormality noted-- osteopenia and osteoprosis     COLORECTAL CANCER  SCREENING: Up to date (colonoscopy q10y; sigmoidoscopy q5y; Cologuard q3y) was last done , Results are in chart; colonoscopy with normal results     EYE EXAM: was last done - Dr. Fisher     MAMMOGRAM: Done within last 2 years and results in are chart was last done may 2019 with normal results     PAP SMEAR: was last done  with normal results         Surgical History:         Cholecystectomy    : X 2;     Dilation and Curettage: multiple;     Fracture Repair: RIGHT WRIST;;; 2019;     Procedures:    Colonoscopy ( --NEG;; )    LEEP procedure (  )    Treadmill stress test ( --NEG;; )         Family History:     Father:  at age 38; Cause of death was lung cancer     Mother:  at age 54; Cause of death was Shy-Draegers/Neurological Disorder     Brother(s): 3 brother(s) total;  Hyperlipidemia;  Schizophrenia     Sister(s): 5 sister(s) total;  Hyperlipidemia;  Type 2 Diabetes; thyroid disorder;  Rheumatoid Arthritis     Son(s): # sons total son(s) total;  Type 1 Diabetes     Positive for Coronary Artery Disease.          Social History:     Occupation: Homemaker     Marital Status:      Children: 2 children (ages 29/11 )         Tobacco/Alcohol/Supplements:     Last Reviewed on 2020 11:18 AM by Clementina Aragon    Tobacco: She has a past history of cigarette smoking; quit date:  .          Alcohol: Frequency: Socially     Caffeine:  She admits to consuming caffeine via coffee ( 1 serving per day ).          Substance Abuse History:     Last Reviewed on 2020 11:18 AM by Clementina Aragon    None         Mental Health History:     Last Reviewed on 2020 11:18 AM by Clementina Aragon        Communicable Diseases (eg STDs):     Last Reviewed on 3/04/2019 09:08 AM by Jennie Andre        Immunizations:     Havrix -adult dose (HepA) 2018    Hep A, adult dose 2018    Fluzone Quadrivalent (3+ years) 10/8/2018    Influenza, split virus pf (3+ years  dose) 9/19/2017    Influenza quadrivalent, recombinant, pf 10/5/2019        Allergies:     Last Reviewed on 4/04/2020 11:18 AM by Clementina Aragon    No Known Allergies.        Current Medications:     Last Reviewed on 4/04/2020 11:18 AM by Clementina Aragon    Advair HFA  [BID]    baclofen     Ibuprofen 800mg Tablet [Take 1 tablet(s) by mouth q8h prn]    Neurontin 300 mg oral capsule [One tablet PO TID PRN]    Effexor  mg oral Capsule, Extended Release 24 hr [Take 1 capsule(s) by mouth daily]    Simvastatin 20 mg oral tablet [1 tab daily]    OTC Multivitamin Take one tablet once daily prn          lisinopriL-hydrochlorothiazide 10-12.5 mg oral tablet [one a day]    cyclobenzaprine 10 mg oral tablet [PRN]    ProAir HFA 90 mcg/actuation Inhalation HFA Aerosol Inhaler [prn]        Objective:        Vitals:         Current: 4/4/2020 10:53:04 AM    Ht:  5 ft, 1.5 in;  Wt: 164 lbs;  BMI: 30.5T: 98.2 F (oral);  BP: 131/96 mm Hg (right arm, sitting);  P: 128 bpm (right arm (BP Cuff), sitting);  sCr: 0.93 mg/dL;  GFR: 63.70        Assessment:         J06.9   Acute upper respiratory infection, unspecified           ORDERS:         Meds Prescribed:       [New Rx] Augmentin 875-125 mg oral tablet [take 1 tablet by oral route every 12 hours], #20 (twenty) tablets, Refills: 0 (zero)       [New Rx] predniSONE 20 mg oral tablet [take 1 tab (20mg )  by oral route once daily], #5 (five) tablets, Refills: 0 (zero)                 Plan:         Acute upper respiratory infection, unspecifiedIncrease fluid intake and rest. Tylenol/ibuprofen for discomfort/fever. Complete rx abx and steroid. Continue otc measures for symptom relief. Notify clinic with any acute concerns or issues. Pt v/u and had no further questions.     Telehealth: Verbal consent obtained for visit to occur via phone call; Staff, other than provider, present during telephone visit include Sima WATKINS MA; Total time spent was 6 minutes; [id number]--Gpqogchki E/M 5-10  minutes           Prescriptions:       [New Rx] Augmentin 875-125 mg oral tablet [take 1 tablet by oral route every 12 hours], #20 (twenty) tablets, Refills: 0 (zero)       [New Rx] predniSONE 20 mg oral tablet [take 1 tab (20mg )  by oral route once daily], #5 (five) tablets, Refills: 0 (zero)             Charge Capture:         Primary Diagnosis:     J06.9  Acute upper respiratory infection, unspecified           Orders:      [id number]  Phys/QHP telephone evaluation 5-10 min  (In-House)

## 2021-05-18 NOTE — PROGRESS NOTES
Katia Spears 1960     Office/Outpatient Visit    Visit Date: Tue, Aug 13, 2019 11:45 am    Provider: Jennie Andre N.P. (Assistant: Ira Oseguera MA)    Location: [facility]        Electronically signed by Jennie Andre N.P. on  08/15/2019 06:45:57 PM                             SUBJECTIVE:        CC:     Mrs. Spears is a 58 year old White female.  Patient presents today for medication refills;         HPI:         Dx with anxiety; states doing well on med. Here for f/u and refills.          Concerning hypertension, states doing well on me. Here for f/u and refills.          Concerning fibromyalgia, other details: Pt states doing well on meds. Here for f/u and refills..          PHQ-9 Depression Screening: Completed form scanned and in chart; Total Score 4 Alcohol Consumption Screening: Completed form scanned and in chart; Total Score 1     ROS:     CONSTITUTIONAL:  Negative for chills, fatigue, fever, and weight change.      EYES:  Negative for blurred vision.      CARDIOVASCULAR:  Negative for chest pain, orthopnea, paroxysmal nocturnal dyspnea and pedal edema.      RESPIRATORY:  Negative for dyspnea.      GASTROINTESTINAL:  Negative for abdominal pain, constipation, diarrhea, nausea and vomiting.      NEUROLOGICAL:  Negative for dizziness, headaches, paresthesias, and weakness.      PSYCHIATRIC:  Negative for anxiety, depression, and sleep disturbances.          PMH/FMH/SH:     Last Reviewed on 3/04/2019 09:08 AM by Jennie Andre    Past Medical History:             GYNECOLOGICAL HISTORY:     miscarriage 4         CURRENT MEDICAL PROVIDERS:    Obstetrician/Gynecologist    Rheumatologist         PREVENTIVE HEALTH MAINTENANCE             BONE DENSITY: was last done  with the following abnormality noted-- osteopenia and osteoprosis     COLORECTAL CANCER SCREENING: colonoscopy with normal results     EYE EXAM: was last done - Dr. Fisher     MAMMOGRAM: was last done  2017 with normal results     PAP SMEAR: was last done 2018 with normal results         Surgical History:         Cholecystectomy    : X 2;     Dilation and Curettage: multiple;     Procedures:    Colonoscopy ( --NEG;; )    LEEP procedure (  )    Treadmill stress test ( --NEG;; )         Family History:     Father:  at age 38; Cause of death was lung cancer     Mother:  at age 54; Cause of death was Shy-Draegers/Neurological Disorder     Brother(s): 3 brother(s) total;  Hyperlipidemia;  Schizophrenia     Sister(s): 5 sister(s) total;  Hyperlipidemia;  Type 2 Diabetes; thyroid disorder;  Rheumatoid Arthritis     Son(s): # sons total son(s) total;  Type 1 Diabetes     Positive for Coronary Artery Disease.          Social History:     Occupation: Homemaker     Marital Status:      Children: 2 children (ages 29/11 )         Tobacco/Alcohol/Supplements:     Last Reviewed on 3/04/2019 09:08 AM by Jennie Andre    Tobacco: She has a past history of cigarette smoking; quit date:  .          Alcohol: Frequency: Socially     Caffeine:  She admits to consuming caffeine via coffee ( 1 serving per day ).          Substance Abuse History:     Last Reviewed on 3/04/2019 09:08 AM by Jennie Andre    None         Mental Health History:     Last Reviewed on 3/04/2019 09:08 AM by Jennie Andre        Communicable Diseases (eg STDs):     Last Reviewed on 3/04/2019 09:08 AM by Jennie Andre            Current Problems:     Last Reviewed on 3/04/2019 09:08 AM by Jennie Andre    Anemia, unspecified     Chronic obstructive asthma, unspecified     Hyperlipidemia     Vitamin D deficiency, unspecified     Chronic sinusitis, unspecified     Hypertension     Anxiety     Obstructive sleep apnea     Chronic venous insufficiency     Fibromyalgia     Generalized osteoarthritis, multiple sites     GERD     Chronic low back pain     Postmenopausal osteoporosis     Post-menopausal state      Depression with anxiety         Immunizations:     Havrix -adult dose (HepA) 6/2/2018     Hep A, adult dose 12/11/2018     Fluzone Quadrivalent (3+ years) 10/8/2018     Influenza, split virus pf (3+ years dose) 9/19/2017         Allergies:     Last Reviewed on 3/04/2019 09:08 AM by Jennie Andre      No Known Drug Allergies.         Current Medications:     Last Reviewed on 3/04/2019 09:08 AM by Jennie Andre    Lisinopril/Hydrochlorothiazide 10mg/12.5mg Tablet one a day     Neurontin 300mg Capsules One tablet PO TID PRN     Simvastatin 20mg Tablet 1 tab daily     Effexor XR 150mg Capsules, Extended Release Take 1 capsule(s) by mouth daily     Ibuprofen 800mg Tablet Take 1 tablet(s) by mouth q8h prn     Cyclobenzaprine HCl 10mg Tablet PRN     Flovent HFA 110mcg/1actuation Oral Inhaler 2 puffs bid     ProAir HFA 90mcg/1actuation Oral Inhaler prn     OTC Multivitamin Take one tablet once daily prn         OBJECTIVE:        Vitals:         Current: 8/13/2019 11:50:26 AM    Ht:  5 ft, 1.5 in;  Wt: 176.8 lbs;  BMI: 32.9    T: 97.9 F (oral);  BP: 105/47 mm Hg (left arm, sitting);  P: 89 bpm (left arm (BP Cuff), sitting);  sCr: 0.94 mg/dL;  GFR: 65.87        Exams:     PHYSICAL EXAM:     GENERAL: vital signs recorded - well developed, well nourished;  no apparent distress;     EYES: extraocular movements intact; conjunctiva and cornea are normal; PERRLA;     NECK: range of motion is normal; thyroid is non-palpable;     RESPIRATORY: normal respiratory rate and pattern with no distress; normal breath sounds with no rales, rhonchi, wheezes or rubs;     CARDIOVASCULAR: normal rate; rhythm is regular;  no systolic murmur; no edema;     GASTROINTESTINAL: nontender; normal bowel sounds; no organomegaly;     NEUROLOGICAL:  cranial nerves, motor and sensory function, reflexes, gait and coordination are all intact;     PSYCHIATRIC:  appropriate affect and demeanor; normal speech pattern; grossly normal memory;          Lab/Test Results:             Urine temperature:  confirmed (08/13/2019),     All urine drug screen levels confirmed negative:  yes (08/13/2019),     Date and time of last pill:  Gabapentin 08/13/2019 @ 0900/AS (08/13/2019),     Performed by:  melina (08/13/2019),     Collection Time:  12:10 (08/13/2019),             ASSESSMENT:           300.02   F41.1  Anxiety              DDx:     401.1   I10  Hypertension              DDx:     729.1   M79.7  Fibromyalgia              DDx:     V79.0   Z13.89  Screening for depression              DDx:         ORDERS:         Meds Prescribed:       Refill of: Effexor XR (Venlafaxine HCl) 150mg Capsules, Extended Release Take 1 capsule(s) by mouth daily  #90 (Ninety) capsule(s) Refills: 1       Refill of: Lisinopril/Hydrochlorothiazide 10mg/12.5mg Tablet one a day  #90 (Ninety) tablet(s) Refills: 1       Refill of: Neurontin (Gabapentin) 300mg Capsules One tablet PO TID PRN  #180 (One [place] and Eighty) capsule(s) Refills: 1         Lab Orders:       [id number]  Drug test prsmv qual dir optical obs per day  (In-House)                   PLAN:          Anxiety           Prescriptions:       Refill of: Effexor XR (Venlafaxine HCl) 150mg Capsules, Extended Release Take 1 capsule(s) by mouth daily  #90 (Ninety) capsule(s) Refills: 1          Hypertension           Prescriptions:       Refill of: Lisinopril/Hydrochlorothiazide 10mg/12.5mg Tablet one a day  #90 (Ninety) tablet(s) Refills: 1          Fibromyalgia     LABORATORY:  Labs ordered to be performed today include Drug screen.  Controlled substance documentation: Ian reviewed; drug screen performed and appropriate; consent is reviewed and signed and on the chart.  She is aware of risk of addiction on this medication, understands that she will need to follow up for a review every 3 months and her medications will be adjusted or decreased as deemed appropriate at each visit.  No history of drug or alcohol abuse.  No concerns about  diversion or abuse. She denies side effects related to the medication.  She is aware that she may be called in for pill counts.  The dosing of this medication will be reviewed on a regular basis and reduced if possible..  Ongoing use of a controlled substance is necessary for this patient to have a normal quality of life           Prescriptions:       Refill of: Neurontin (Gabapentin) 300mg Capsules One tablet PO TID PRN  #180 (One [place] and Eighty) capsule(s) Refills: 1           Orders:       [id number]  Drug test prsmv qual dir optical obs per day  (In-House)               Other Orders:       [id number]  Office/outpatient visit; established patient, level 4  (In-House)           CHARGE CAPTURE:           Primary Diagnosis:     300.02 Anxiety            F41.1    Generalized anxiety disorder    401.1 Hypertension            I10    Essential (primary) hypertension    729.1 Fibromyalgia            M79.7    Fibromyalgia              Orders:          [id number]   Drug test prsmv qual dir optical obs per day  (In-House)           V79.0 Screening for depression            Z13.89    Encounter for screening for other disorder        Other Orders:           [id number]   Office/outpatient visit; established patient, level 4  (In-House)

## 2021-05-18 NOTE — PROGRESS NOTES
Katia Spears 1960     Office/Outpatient Visit    Visit Date: Sat 10:40 am    Provider: Jennie Andre N.P. (Assistant: Waleska Yeung MA)    Location: [facility]        Electronically signed by Jennie Andre N.P. on  2018 01:13:39 PM                             SUBJECTIVE:        CC: Pt also seen by JESUS Jay NP student     Mrs. Spears is a 57 year old White female.  cough, drainage;         HPI:         Patient complains of acute upper respiratory infection of multiple sites.  These have been present for the past 5 days.  The symptoms include chest congestion, productive cough, ear congestion,  nasal congestion, nasal discharge, sinus pain/pressure and purulent sputum production.  She denies body aches, fever or wheezing.  She reports recent exposure to illness.  She has not tried any medications for symptomatic relief.  Pertinent medical history is unremarkable.      ROS:     CONSTITUTIONAL:  Positive for fatigue ( moderate ).      EYES:  Negative for blurred vision.      E/N/T:  Positive for ear pain ( bilateral ), nasal congestion and frequent rhinorrhea.   Negative for sore throat.      CARDIOVASCULAR:  Negative for chest pain, orthopnea, paroxysmal nocturnal dyspnea and pedal edema.      RESPIRATORY:  Positive for recent cough ( with scant amounts of purulent sputum ) and dyspnea ( at rest ).      GASTROINTESTINAL:  Negative for abdominal pain, constipation, diarrhea, nausea and vomiting.      NEUROLOGICAL:  Negative for dizziness, headaches, paresthesias, and weakness.      PSYCHIATRIC:  Negative for anxiety, depression, and sleep disturbances.          PMH/FMH/SH:     Last Reviewed on 2018 12:11 PM by Jennie Andre    Past Medical History:             GYNECOLOGICAL HISTORY:     miscarriage 4         CURRENT MEDICAL PROVIDERS:    Obstetrician/Gynecologist    Rheumatologist         PREVENTIVE HEALTH MAINTENANCE             BONE DENSITY: was last  done  with the following abnormality noted-- osteopenia and osteoprosis     COLORECTAL CANCER SCREENING: colonoscopy with normal results     MAMMOGRAM: was last done  with normal results     PAP SMEAR: was last done 2017 with normal results         Surgical History:         Cholecystectomy    : X 2;     Dilation and Curettage: multiple;     Procedures:    Colonoscopy ( --NEG;; )    LEEP procedure (  )    Treadmill stress test ( --NEG;; )         Family History:     Father:  at age 38; Cause of death was lung cancer     Mother:  at age 54; Cause of death was Shy-Draegers/Neurological Disorder     Brother(s): 3 brother(s) total;  Hyperlipidemia;  Schizophrenia     Sister(s): 5 sister(s) total;  Hyperlipidemia;  Type 2 Diabetes; thyroid disorder;  Rheumatoid Arthritis     Son(s): # sons total son(s) total;  Type 1 Diabetes     Positive for Coronary Artery Disease.          Social History:     Occupation: Homemaker     Marital Status:      Children: 2 children (ages 29/11 )         Tobacco/Alcohol/Supplements:     Last Reviewed on 2018 12:11 PM by Jennie Andre    Tobacco: She has a past history of cigarette smoking; quit date:  .          Alcohol: Frequency: Socially     Caffeine:  She admits to consuming caffeine via coffee ( 1 serving per day ).          Substance Abuse History:     Last Reviewed on 2018 12:11 PM by Jennie Andre    None         Mental Health History:     Last Reviewed on 2018 12:11 PM by Jennie Andre        Communicable Diseases (eg STDs):     Last Reviewed on 2018 12:11 PM by Jennie Andre            Current Problems:     Last Reviewed on 2018 12:11 PM by Jennie Andre    Hyperlipidemia     Vitamin D deficiency, unspecified     Chronic sinusitis, unspecified     Hypertension     Anxiety     Obstructive sleep apnea     Chronic venous insufficiency     Fibromyalgia     Generalized osteoarthritis, multiple  sites     GERD     Chronic low back pain     Postmenopausal osteoporosis     Post-menopausal state     Depression responsive to treatment     Shoulder pain         Immunizations:     Havrix -adult dose (HepA) 6/2/2018     Influenza, split virus pf (3+ years dose) 9/19/2017         Allergies:     Last Reviewed on 6/09/2018 10:43 AM by Waleska Yeung      No Known Drug Allergies.         Current Medications:     Last Reviewed on 6/09/2018 10:43 AM by Waleska Yeung    Baclofen 20mg Tablet Take 1 tablet(s) by mouth tid PRN     Hydrochlorothiazide (HCTZ) 25mg Tablet 1 tab daily     Neurontin 300mg Capsules One tablet PO TID PRN     Effexor XR 37.5mg Capsules, Extended Release Take 1 capsule(s) by mouth daily     Simvastatin 20mg Tablet 1 tab daily     Ibuprofen 800mg Tablet Take 1 tablet(s) by mouth q8h prn     Vitamin D3 2,000units qd         OBJECTIVE:        Vitals:         Current: 6/9/2018 10:43:24 AM    Ht:  5 ft, 1.5 in;  Wt: 173.4 lbs;  BMI: 32.2    T: 98.4 F (oral);  BP: 130/84 mm Hg (left arm, sitting);  P: 85 bpm (left arm (BP Cuff), sitting);  sCr: 0.82 mg/dL;  GFR: 75.80        Exams:     PHYSICAL EXAM:     GENERAL: vital signs recorded - well developed, well nourished;  no apparent distress;     EYES: extraocular movements intact; conjunctiva and cornea are normal; PERRLA;     E/N/T: EARS:  normal external auditory canals and tympanic membranes;  grossly normal hearing; NOSE: nasal mucosa is erythematous;  turbinates are mildly swollen bilaterally;  no sinus tenderness; OROPHARYNX: posterior pharynx shows erythema;     NECK: range of motion is normal; thyroid is non-palpable;     RESPIRATORY: normal respiratory rate and pattern with no distress; normal breath sounds with no rales, rhonchi, wheezes or rubs;     CARDIOVASCULAR: normal rate; rhythm is regular;  no systolic murmur; no edema;     GASTROINTESTINAL: nontender; normal bowel sounds; no organomegaly;     NEUROLOGICAL:  cranial nerves, motor and  sensory function, reflexes, gait and coordination are all intact;     PSYCHIATRIC:  appropriate affect and demeanor; normal speech pattern; grossly normal memory;         ASSESSMENT:           465.8   J06.9  Acute upper respiratory infection of multiple sites              DDx:         ORDERS:         Meds Prescribed:       Amoxicillin 875mg Tablet One PO BID X 10 days.  #20 (Twenty) tablet(s) Refills: 0       Medrol (Methylprednisolone) 4mg Dosepak Take as directed with food  #1 (One) dose pack Refills: 0       Benzonatate 200mg Capsules 1 capsule tid  #30 (Thirty) capsule(s) Refills: 0         Lab Orders:       APPTO  Appointment need  (In-House)                   PLAN:          Acute upper respiratory infection of multiple sites         FOLLOW-UP: Schedule a follow-up visit in 4 months..   for change Sept appointment to Annual Physical           Prescriptions:       Amoxicillin 875mg Tablet One PO BID X 10 days.  #20 (Twenty) tablet(s) Refills: 0       Medrol (Methylprednisolone) 4mg Dosepak Take as directed with food  #1 (One) dose pack Refills: 0       Benzonatate 200mg Capsules 1 capsule tid  #30 (Thirty) capsule(s) Refills: 0           Orders:       APPTO  Appointment need  (In-House)               Patient Recommendations:        For  Acute upper respiratory infection of multiple sites:     Schedule a follow-up visit in 4 months.                APPOINTMENT INFORMATION:        Monday Tuesday Wednesday Thursday Friday Saturday Sunday            Time:___________________AM  PM   Date:_____________________             CHARGE CAPTURE:           Primary Diagnosis:     465.8 Acute upper respiratory infection of multiple sites            J06.9    Acute upper respiratory infection, unspecified              Orders:          [id number]   Office/outpatient visit; established patient, level 3  (In-House)             APPTO   Appointment need  (In-House)

## 2021-05-18 NOTE — PROGRESS NOTES
Katia Spears 1960     Office/Outpatient Visit    Visit Date:  11:59 am    Provider: Jennie Andre N.P. (Assistant: Saima Roque MA)    Location: [facility]        Electronically signed by Jennie Andre N.P. on  2018 08:57:03 AM                             SUBJECTIVE:        CC:     Mrs. Spears is a 57 year old White female.  (Patient not taking Baclofen or HCTZ) Here for med refill and discuss possible referral for shoulder;         HPI:         Mrs. Spears presents with shoulder pain.  Pt states R shoulder and scapula pain x 6 months. States going to PT with some relief. Xray showing arthritis. However, she is feeling that there is more going on.      ROS:     CONSTITUTIONAL:  Negative for chills, fatigue, fever, and weight change.      EYES:  Negative for blurred vision.      CARDIOVASCULAR:  Negative for chest pain, orthopnea, paroxysmal nocturnal dyspnea and pedal edema.      RESPIRATORY:  Negative for dyspnea.      GASTROINTESTINAL:  Negative for abdominal pain, constipation, diarrhea, nausea and vomiting.      NEUROLOGICAL:  Negative for dizziness, headaches, paresthesias, and weakness.      PSYCHIATRIC:  Negative for anxiety, depression, and sleep disturbances.          PMH/FMH/SH:     Last Reviewed on 2018 12:11 PM by Jennie Andre    Past Medical History:             GYNECOLOGICAL HISTORY:     miscarriage 4         CURRENT MEDICAL PROVIDERS:    Obstetrician/Gynecologist    Rheumatologist         PREVENTIVE HEALTH MAINTENANCE             BONE DENSITY: was last done  with the following abnormality noted-- osteopenia and osteoprosis     COLORECTAL CANCER SCREENING: colonoscopy with normal results     MAMMOGRAM: was last done  with normal results     PAP SMEAR: was last done 2017 with normal results         Surgical History:         Cholecystectomy    : X 2;     Dilation and Curettage: multiple;     Procedures:    Colonoscopy  ( --NEG;; )    LEEP procedure (  )    Treadmill stress test ( --NEG;; )         Family History:     Father:  at age 38; Cause of death was lung cancer     Mother:  at age 54; Cause of death was Shy-Draegers/Neurological Disorder     Brother(s): 3 brother(s) total;  Hyperlipidemia;  Schizophrenia     Sister(s): 5 sister(s) total;  Hyperlipidemia;  Type 2 Diabetes; thyroid disorder;  Rheumatoid Arthritis     Son(s): # sons total son(s) total;  Type 1 Diabetes     Positive for Coronary Artery Disease.          Social History:     Occupation: Homemaker     Marital Status:      Children: 2 children (ages 29/11 )         Tobacco/Alcohol/Supplements:     Last Reviewed on 2018 12:11 PM by Jennie Andre    Tobacco: She has a past history of cigarette smoking; quit date:  .          Alcohol: Frequency: Socially     Caffeine:  She admits to consuming caffeine via coffee ( 1 serving per day ).          Substance Abuse History:     Last Reviewed on 2018 12:11 PM by Jennie Andre    None         Mental Health History:     Last Reviewed on 2018 12:11 PM by Jennie Andre        Communicable Diseases (eg STDs):     Last Reviewed on 2018 12:11 PM by Jennie Andre            Current Problems:     Last Reviewed on 2018 12:11 PM by Jennie Andre    Hyperlipidemia     Vitamin D deficiency, unspecified     Chronic sinusitis, unspecified     Hypertension     Anxiety     Obstructive sleep apnea     Chronic venous insufficiency     Fibromyalgia     Generalized osteoarthritis, multiple sites     GERD     Chronic low back pain     Postmenopausal osteoporosis     Post-menopausal state     Depression responsive to treatment     Shoulder pain         Immunizations:     Influenza, split virus pf (3+ years dose) 2017         Allergies:     Last Reviewed on 2018 12:11 PM by Jennie Andre      No Known Drug Allergies.         Current Medications:     Last  Reviewed on 6/01/2018 12:11 PM by Jennie Andre    Effexor XR 37.5mg Capsules, Extended Release Take 1 capsule(s) by mouth daily     Neurontin 300mg Capsules One tablet PO TID PRN     Hydrochlorothiazide (HCTZ) 25mg Tablet 1 tab daily     Simvastatin 20mg Tablet 1 tab daily     Ibuprofen 800mg Tablet Take 1 tablet(s) by mouth q8h prn     Baclofen 20mg Tablet Take 1 tablet(s) by mouth tid PRN     Vitamin D3 2,000units qd         OBJECTIVE:        Vitals:         Current: 6/1/2018 12:02:34 PM    Ht:  5 ft, 1.5 in;  Wt: 175.5 lbs;  BMI: 32.6    T: 98.7 F (oral);  BP: 136/91 mm Hg (right arm, sitting);  P: 84 bpm (right arm (BP Cuff), sitting);  sCr: 0.82 mg/dL;  GFR: 76.18        Exams:     PHYSICAL EXAM:     GENERAL: vital signs recorded - well developed, well nourished;  no apparent distress;     EYES: extraocular movements intact; conjunctiva and cornea are normal; PERRLA;     E/N/T:  normal EACs, TMs, nasal/oral mucosa, teeth, gingiva, and oropharynx;     NECK: range of motion is normal; thyroid is non-palpable;     RESPIRATORY: normal respiratory rate and pattern with no distress; normal breath sounds with no rales, rhonchi, wheezes or rubs;     CARDIOVASCULAR: normal rate; rhythm is regular;  no systolic murmur; no edema;     GASTROINTESTINAL: nontender; normal bowel sounds; no organomegaly;     NEUROLOGICAL:  cranial nerves, motor and sensory function, reflexes, gait and coordination are all intact;     PSYCHIATRIC:  appropriate affect and demeanor; normal speech pattern; grossly normal memory;         Lab/Test Results:             Urine temperature:  confirmed (06/01/2018),     All urine drug screen levels confirmed negative:  yes (06/01/2018),     Date and time of last pill:  Gabapentin 06-01 @ 12AM (06/01/2018),     Performed by:  sailaja (06/01/2018),     Collection Time:  1235 (06/01/2018),             ASSESSMENT:           719.41   M25.511  Shoulder pain              DDx:     401.1   I10  Hypertension               DDx:     729.1   M79.7  Fibromyalgia              DDx:         ORDERS:         Meds Prescribed:       Refill of: Hydrochlorothiazide (HCTZ) 25mg Tablet 1 tab daily  #90 (Ninety) tablet(s) Refills: 0       Refill of: Baclofen 20mg Tablet Take 1 tablet(s) by mouth tid PRN  #90 (Ninety) tablet(s) Refills: 0       Refill of: Neurontin (Gabapentin) 300mg Capsules One tablet PO TID PRN  #60 (Sixty) capsule(s) Refills: 2         Radiology/Test Orders:       [id number]  Magnetic resonance imaging, any joint of upper extremity, with contrast  (Send-Out)           Lab Orders:       APPTO  Appointment need  (In-House)         [id number]  Drug test prsmv qual dir optical obs per day  (In-House)                   PLAN:          Shoulder pain         FOLLOW-UP TESTING #1:    RADIOLOGY:  I have ordered MRI shoulder upper extremity shoulder with contrast to be done today. R MRI shoulder and scapula           Prescriptions:       Refill of: Baclofen 20mg Tablet Take 1 tablet(s) by mouth tid PRN  #90 (Ninety) tablet(s) Refills: 0           Orders:       [id number]  Magnetic resonance imaging, any joint of upper extremity, with contrast  (Send-Out)            Hypertension         FOLLOW-UP: Schedule a follow-up visit in 3 months..   for HTN appointment to be scheduled with [facility] Chronic visit follow up           Prescriptions:       Refill of: Hydrochlorothiazide (HCTZ) 25mg Tablet 1 tab daily  #90 (Ninety) tablet(s) Refills: 0           Orders:       APPTO  Appointment need  (In-House)            Fibromyalgia Contract signed.     LABORATORY:  Labs ordered to be performed today include Drug screen.  Controlled substance documentation: Ian reviewed; drug screen performed and appropriate; consent is reviewed and signed and on the chart.  She is aware of risk of addiction on this medication, understands that she will need to follow up for a review every 3 months and her medications will be adjusted or decreased as deemed appropriate at  each visit.  No history of drug or alcohol abuse.  No concerns about diversion or abuse. She denies side effects related to the medication.  She is aware that she may be called in for pill counts.  The dosing of this medication will be reviewed on a regular basis and reduced if possible..  Ongoing use of a controlled substance is necessary for this patient to have a normal quality of life           Prescriptions:       Refill of: Neurontin (Gabapentin) 300mg Capsules One tablet PO TID PRN  #60 (Sixty) capsule(s) Refills: 2           Orders:       [id number]  Drug test prsmv qual dir optical obs per day  (In-House)               Patient Recommendations:        For  Hypertension:     Schedule a follow-up visit in 3 months.                APPOINTMENT INFORMATION:        Monday Tuesday Wednesday Thursday Friday Saturday Sunday            Time:___________________AM  PM   Date:_____________________             CHARGE CAPTURE:           Primary Diagnosis:     719.41 Shoulder pain            M25.511    Pain in right shoulder              Orders:          [id number]   Office/outpatient visit; established patient, level 4  (In-House)           401.1 Hypertension            I10    Essential (primary) hypertension              Orders:          APPTO   Appointment need  (In-House)           729.1 Fibromyalgia            M79.7    Fibromyalgia              Orders:          12393   Drug test prsmv qual dir optical obs per day  (In-House)               ADDENDUMS:      ____________________________________    Addendum: 06/07/2018 09:15 AM - Jennie Andre        Add: Z79.899 Encounter for long term (current) use of medication        Date: 06/27/2018 10:38 AM    Author: Ila Busch         Visit Note Faxed to:        Lanre Lin  (Orthopedics); Number [phone]

## 2021-05-18 NOTE — PROGRESS NOTES
Katia Spears  1960     Office/Outpatient Visit    Visit Date: Thu, Mar 11, 2021 09:07 am    Provider: Esha Galeana N.P. (Assistant: Tessa Hurst MA)    Location: [facility]        Electronically signed by Esha Galeana N.P. on  03/11/2021 07:50:58 PM                             Subjective:        CC: Mrs. Spears is a 60 year old White female.  This is a follow-up visit.  Not taking baclofen;         HPI: ENT seen yesterday in follow up-  has decided not have have septoplasty for nowmammogram and pap women first jan 2021 normal.tdap and flu vaccine  here.          Patient presents with dysthymic disorder.  stable on effexor.  denies side effects.  requests refills.            Additionally, she presents with history of fibromyalgia.  other details: stable on gabapentin.  denies side effects.  requests refills..    has seen [facility] rheumatology in the past.  would like to see dr auguste or dr hernandez for opinion..  this office faxed information for referral to that office sept 2020 but pt has not rec'd appt.  would also like to restart muscle relaxer for prn use -  has not taken in 2 yrs..            Additionally, she presents with history of essential (primary) hypertension.  her current cardiac medication regimen includes lisinopril/hctz.  Compliance with treatment has been good; she takes her medication as directed.  She is tolerating the medication well without side effects.  She did not bring her blood pressure diary, but says that pressures have been well controlled.            Additionally, she presents with history of mixed hyperlipidemia.  current treatment includes Zocor.  Compliance with treatment has been fair; she skips some medication doses.  She denies experiencing any hypercholesterolemia related symptoms.  takes simvastatin in am.            Additionally, she presents with history of anemia, unspecified.  hematology evaluation deemed chronic and possibly due to  hormones.  requests recheck.      ROS:     CONSTITUTIONAL:  Positive for fatigue ( mild ).   Negative for chills or fever.      CARDIOVASCULAR:  Negative for chest pain, palpitations, tachycardia, orthopnea, and edema.      RESPIRATORY:  Negative for cough, dyspnea, and hemoptysis.      GASTROINTESTINAL:  Negative for abdominal pain, heartburn, constipation, diarrhea, and stool changes.      MUSCULOSKELETAL:  Positive for myalgias.   Negative for back pain.      NEUROLOGICAL:  Negative for dizziness, headaches, paresthesias, and weakness.      PSYCHIATRIC:  Positive for anxiety ( (stable) ).   Negative for sleep disturbance or suicidal thoughts.          Past Medical History / Family History / Social History:         Last Reviewed on 3/11/2021 09:16 AM by Esha Galeana    Past Medical History:             GYNECOLOGICAL HISTORY:     miscarriage 4         CURRENT MEDICAL PROVIDERS:    Obstetrician/Gynecologist    Rheumatologist         PREVENTIVE HEALTH MAINTENANCE             BONE DENSITY: was last done  with the following abnormality noted-- osteopenia and osteoprosis     COLORECTAL CANCER SCREENING: Up to date (colonoscopy q10y; sigmoidoscopy q5y; Cologuard q3y) was last done , Results are in chart; colonoscopy with normal results     EYE EXAM: was last done - Dr. Fisher     MAMMOGRAM: Done within last 2 years and results in are chart was last done may 2019 with normal results     PAP SMEAR: was last done  with normal results         Surgical History:         Cholecystectomy    : X 2;     Dilation and Curettage: multiple;     Fracture Repair: RIGHT WRIST;;; 2019;     Procedures:    Colonoscopy ( --NEG;; )    LEEP procedure (  )    Treadmill stress test ( --NEG;; )         Family History:     Father:  at age 38; Cause of death was lung cancer     Mother:  at age 54; Cause of death was Shy-Draegers/Neurological Disorder     Brother(s): 3 brother(s) total;   Hyperlipidemia;  Schizophrenia     Sister(s): 5 sister(s) total;  Hyperlipidemia;  Type 2 Diabetes; thyroid disorder;  Rheumatoid Arthritis     Son(s): # sons total son(s) total;  Type 1 Diabetes     Positive for Coronary Artery Disease.          Social History:     Occupation: Homemaker     Marital Status:      Children: 2 children (ages 29/11 )         Tobacco/Alcohol/Supplements:     Last Reviewed on 3/11/2021 09:10 AM by Tessa Hurst    Tobacco: She has a past history of cigarette smoking; quit date:  1996.          Alcohol: Frequency: Socially     Caffeine:  She admits to consuming caffeine via coffee ( 1 serving per day ).          Substance Abuse History:     Last Reviewed on 4/04/2020 11:18 AM by Clementina Aragon    None         Mental Health History:     Last Reviewed on 4/04/2020 11:18 AM by Clementina Aragon        Communicable Diseases (eg STDs):     Last Reviewed on 3/04/2019 09:08 AM by Jennie Andre        Current Problems:     Last Reviewed on 3/11/2021 09:16 AM by Esha Galeana    Dysthymic disorder    Fibromyalgia    Obstructive sleep apnea (adult) (pediatric)    Generalized anxiety disorder    Vitamin D deficiency, unspecified    Essential (primary) hypertension    Chronic sinusitis, unspecified    Chronic obstructive pulmonary disease, unspecified    Anemia, unspecified    Other long term (current) drug therapy    Mixed hyperlipidemia    Hypo-osmolality and hyponatremia    Other difficulties with micturition    Encounter for screening mammogram for malignant neoplasm of breast    Deviated nasal septum    Encounter for immunization    Encounter for screening for other suspected endocrine disorder        Immunizations:     influenza, injectable, quadrivalent, preservative free (FLUZONE QUAD 6237-9272) 12/15/2020    Tdap (ADACEL TDAP) 12/15/2020    Havrix -adult dose (HepA) 6/2/2018    Hep A, adult dose 12/11/2018    Fluzone Quadrivalent (3+ years) 10/8/2018    Influenza, split  virus pf (3+ years dose) 9/19/2017    Influenza quadrivalent, recombinant, pf 10/5/2019        Allergies:     Last Reviewed on 3/11/2021 09:10 AM by Tessa Hurst    No Known Allergies.        Current Medications:     Last Reviewed on 3/11/2021 09:10 AM by Tessa Hurst    Advair HFA  [BID]    baclofen     Ibuprofen 800mg Tablet [Take 1 tablet(s) by mouth q8h prn]    Neurontin 300 mg oral capsule [TAKE ONE CAPSULE BY MOUTH THREE TIMES A DAY AS NEEDED]    Effexor  mg oral Capsule, Extended Release 24 hr [Take 1 capsule(s) by mouth daily]    Simvastatin 20 mg oral tablet [1 tab daily]    lisinopriL-hydrochlorothiazide 10-12.5 mg oral tablet [one a day]    OTC Multivitamin Take one tablet once daily prn          ProAir HFA 90 mcg/actuation Inhalation HFA Aerosol Inhaler [prn]        Objective:        Vitals:         Historical:     9/4/2020  BP:   132/85 mm Hg ( (left arm, , sitting, );) 4/4/2020  Wt:   164lbs    Current: 3/11/2021 9:12:12 AM    Ht:  5 ft, 1.5 in;  Wt: 163 lbs;  BMI: 30.3T: 97 F (temporal);  BP: 127/74 mm Hg (left arm, sitting);  P: 83 bpm (left arm (BP Cuff), sitting);  sCr: 0.94 mg/dL;  GFR: 62.09        Exams:     PHYSICAL EXAM:     GENERAL:  well developed and nourished; appropriately groomed; in no apparent distress;     NECK: thyroid is non-palpable;     RESPIRATORY: normal respiratory rate and pattern with no distress; normal breath sounds with no rales, rhonchi, wheezes or rubs;     CARDIOVASCULAR: normal rate; rhythm is regular;  no edema;     MUSCULOSKELETAL:  Normal range of motion, strength and tone;     NEUROLOGIC: mental status: alert and oriented x 3; GROSSLY INTACT     PSYCHIATRIC:  appropriate affect and demeanor; normal speech pattern; grossly normal memory;         Lab/Test Results:         Urine temperature: Confirmed (03/11/2021),     All urine drug screen levels confirmed negative: yes (03/11/2021),     Performed by: SQ (03/11/2021),     Collection Time: 0922  (03/11/2021),             Assessment:         F34.1   Dysthymic disorder       M79.7   Fibromyalgia       I10   Essential (primary) hypertension       E78.2   Mixed hyperlipidemia       Z79.899   Other long term (current) drug therapy       Z13.29   Encounter for screening for other suspected endocrine disorder       D64.9   Anemia, unspecified           ORDERS:         Meds Prescribed:       [Refilled] Effexor  mg oral Capsule, Extended Release 24 hr [Take 1 capsule(s) by mouth daily], #90 (ninety) capsules, Refills: 1 (one)       [Refilled] lisinopriL-hydrochlorothiazide 10-12.5 mg oral tablet [one a day], #90 (ninety) tablets, Refills: 1 (one)       [Refilled] Simvastatin 20 mg oral tablet [1 tab daily], #90 (ninety) tablets, Refills: 1 (one)       [Refilled] baclofen 10 mg oral tablet [1 tab tid prn muscle pain-  may cause drowsiness], #30 (thirty) tablets, Refills: 3 (three)         Radiology/Test Orders:       3017F  Colorectal CA screen results documented and reviewed (PV)  (In-House)              Lab Orders:       [id number]  Drug test prsmv read direct optical obs pr date  (In-House)            64417  HTNLP - H CMP AND LIPID: [id number], [id number]  (Send-Out)            69436  TSH - [facility] TSH  (Send-Out)            33227  BDCBC - [facility] CBC with 3 part diff  (Send-Out)            69171  IRONP - [facility] Iron and TIBC  (Send-Out)            13847  VB12 - H Vitamin B12  (Send-Out)                      Plan:         Dysthymic disorder    MIPS Vaccines Flu and Pneumonia updated in Shot record Colorectal Cancer Screening is up to date and the results are in the chart           Prescriptions:       [Refilled] Effexor  mg oral Capsule, Extended Release 24 hr [Take 1 capsule(s) by mouth daily], #90 (ninety) capsules, Refills: 1 (one)           Orders:       3017F  Colorectal CA screen results documented and reviewed (PV)  (In-House)              Fibromyalgia        she is to call [facility] to enquire about appt.   this office initiated referral .  baclofen may cause drowsiness.            Prescriptions:       [Refilled] baclofen 10 mg oral tablet [1 tab tid prn muscle pain-  may cause drowsiness], #30 (thirty) tablets, Refills: 3 (three)         Essential (primary) hypertension    LABORATORY:  Labs ordered to be performed today include HTN/Lipid Panel: CMP, Lipid.      RECOMMENDATIONS given include: perform routine monitoring of blood pressure with home blood pressure cuff, exercise, reduction of dietary salt intake, and take medication as prescribed, try not to miss doses.            Prescriptions:       [Refilled] lisinopriL-hydrochlorothiazide 10-12.5 mg oral tablet [one a day], #90 (ninety) tablets, Refills: 1 (one)           Orders:       [id number]  HTNLP - [facility] CMP AND LIPID: [id number], [id number]  (Send-Out)              Mixed hyperlipidemia        RECOMMENDATIONS given include: exercise and low cholesterol/low fat diet.            Prescriptions:       [Refilled] Simvastatin 20 mg oral tablet [1 tab daily], #90 (ninety) tablets, Refills: 1 (one)         Other long term (current) drug [id number] day supply of gabapentin was just recently sent in to mail order pharamcy.            Orders:       [id number]  Drug test prsmv read direct optical obs pr date  (In-House)              Encounter for screening for other suspected endocrine disorder    LABORATORY:  Labs ordered to be performed today include TSH.            Orders:       25661  TSH - [facility] TSH  (Send-Out)              Anemia, unspecified    LABORATORY:  Labs ordered to be performed today include Anemia profile CBC Serum iron Vitamin B12.            Orders:       02881  BDCBC - [facility] CBC with 3 part diff  (Send-Out)            07736  IRONP - H Iron and TIBC  (Send-Out)            37463  VB12 - [facility] Vitamin B12  (Send-Out)                  Patient Recommendations:        For  Essential (primary) hypertension:    Begin monitoring your blood pressure by brief nurse visits at our office, a  home blood pressure monitor, or by checking on the machines in pharmacies or stores.  Keep a log of the readings. Maintain a regular exercise program. Reduce the amount of salt in your diet.          For  Mixed hyperlipidemia:    Maintain a regular exercise program. Reduce the amount of cholesterol and saturated fat in your diet.              Charge Capture:         Primary Diagnosis:     F34.1  Dysthymic disorder           Orders:      75587  Office/outpatient visit; established patient, level 4  (In-House)            3017F  Colorectal CA screen results documented and reviewed (PV)  (In-House)              M79.7  Fibromyalgia     I10  Essential (primary) hypertension     E78.2  Mixed hyperlipidemia     Z79.899  Other long term (current) drug therapy           Orders:      46921  Drug test prsmv read direct optical obs pr date  (In-House)              Z13.29  Encounter for screening for other suspected endocrine disorder     D64.9  Anemia, unspecified

## 2021-05-18 NOTE — PROGRESS NOTES
Katia Spears  1960     Office/Outpatient Visit    Visit Date: Fri, Feb 21, 2020 09:28 am    Provider: Esha Galeana N.P. (Assistant: Luz Rainey MA)    Location: [facility]        Electronically signed by Esha Galeana N.P. on  02/25/2020 08:54:11 AM                             Subjective:        CC: Mrs. Spears is a 59 year old White female.  [facility], med refill;         HPI:           Patient presents with generalized anxiety disorder.  stable on effexor.  denies side effects.  requests refills.            Dx with essential (primary) hypertension; her current cardiac medication regimen includes lisinopril/hctz.  Compliance with treatment has been good; she takes her medication as directed.  She is tolerating the medication well without side effects.  She did not bring her blood pressure diary, but says that pressures have been well controlled.            Concerning fibromyalgia, other details: stable on gabapentin.  denies side effects.  requests refills..            In regard to the mixed hyperlipidemia, current treatment includes Zocor.  Compliance with treatment has been fair; she skips some medication doses.  She denies experiencing any hypercholesterolemia related symptoms.            left lower , back tooth pulled 2 days ago by [facility].  hurts this am.  concerned it is infected.  has not called dental office yet.  would like an antibiotic.  if does not get one here she may have to go to urgent care.  afebrile.            PHQ-9 Depression Screening: Completed form scanned and in chart; Total Score 4     ROS:     CONSTITUTIONAL:  Positive for fatigue ( mild; anemia released from hematology on progesterone and intravaginal hormones.  states she will discuss hormones with [facility] this spring ).   Negative for fever.      CARDIOVASCULAR:  Negative for chest pain, palpitations, tachycardia, orthopnea, and edema.      RESPIRATORY:  Negative for cough,  dyspnea, and hemoptysis.      GASTROINTESTINAL:  Negative for abdominal pain, heartburn, constipation, diarrhea, and stool changes.      MUSCULOSKELETAL:  Positive for back pain ( chronic ) and myalgias.   Negative for arthralgias.      NEUROLOGICAL:  Negative for dizziness, headaches, paresthesias, and weakness.      PSYCHIATRIC:  Positive for anxiety.   Negative for depression, sleep disturbance or suicidal thoughts.          Past Medical History / Family History / Social History:         Last Reviewed on 2020 09:48 AM by Esha Galeana    Past Medical History:             GYNECOLOGICAL HISTORY:     miscarriage 4         CURRENT MEDICAL PROVIDERS:    Obstetrician/Gynecologist    Rheumatologist         PREVENTIVE HEALTH MAINTENANCE             BONE DENSITY: was last done  with the following abnormality noted-- osteopenia and osteoprosis     COLORECTAL CANCER SCREENING: Up to date (colonoscopy q10y; sigmoidoscopy q5y; Cologuard q3y) was last done , Results are in chart; colonoscopy with normal results     EYE EXAM: was last done - Dr. Fisher     MAMMOGRAM: Done within last 2 years and results in are chart was last done may 2019 with normal results     PAP SMEAR: was last done  with normal results         Surgical History:         Cholecystectomy    : X 2;     Dilation and Curettage: multiple;     Fracture Repair: RIGHT WRIST;;; 2019;     Procedures:    Colonoscopy ( --NEG;; )    LEEP procedure (  )    Treadmill stress test ( --NEG;; )         Family History:     Father:  at age 38; Cause of death was lung cancer     Mother:  at age 54; Cause of death was Shy-Draegers/Neurological Disorder     Brother(s): 3 brother(s) total;  Hyperlipidemia;  Schizophrenia     Sister(s): 5 sister(s) total;  Hyperlipidemia;  Type 2 Diabetes; thyroid disorder;  Rheumatoid Arthritis     Son(s): # sons total son(s) total;  Type 1 Diabetes     Positive for Coronary Artery Disease.           Social History:     Occupation: Homemaker     Marital Status:      Children: 2 children (ages 29/11 )         Tobacco/Alcohol/Supplements:     Last Reviewed on 2/21/2020 09:29 AM by Luz Rainey    Tobacco: She has a past history of cigarette smoking; quit date:  1996.          Alcohol: Frequency: Socially     Caffeine:  She admits to consuming caffeine via coffee ( 1 serving per day ).          Substance Abuse History:     Last Reviewed on 3/04/2019 09:08 AM by Jennie Andre    None         Mental Health History:     Last Reviewed on 3/04/2019 09:08 AM by Jennie Andre        Communicable Diseases (eg STDs):     Last Reviewed on 3/04/2019 09:08 AM by Jennie Andre        Current Problems:     Last Reviewed on 2/21/2020 09:41 AM by Esha Galeana    Dysthymic disorder    Fibromyalgia    Obstructive sleep apnea (adult) (pediatric)    Generalized anxiety disorder    Essential (primary) hypertension    Chronic sinusitis, unspecified    Vitamin D deficiency, unspecified    Chronic obstructive pulmonary disease, unspecified    Anemia, unspecified    Encounter for screening for other disorder        Immunizations:     Havrix -adult dose (HepA) 6/2/2018    Hep A, adult dose 12/11/2018    Fluzone Quadrivalent (3+ years) 10/8/2018    Influenza, split virus pf (3+ years dose) 9/19/2017    Influenza quadrivalent, recombinant, pf 10/5/2019        Allergies:     Last Reviewed on 2/21/2020 09:29 AM by Luz Rainey    No Known Allergies.        Current Medications:     Last Reviewed on 2/21/2020 09:29 AM by Luz Rainey    Ibuprofen 800mg Tablet [Take 1 tablet(s) by mouth q8h prn]    Neurontin 300mg Capsules [One tablet PO TID PRN]    Effexor XR 150mg Capsules, Extended Release [Take 1 capsule(s) by mouth daily]    Simvastatin 20 mg oral tablet [1 tab daily]    lisinopriL-hydrochlorothiazide 10-12.5 mg oral tablet [one a day]    OTC Multivitamin Take one tablet once daily prn           cyclobenzaprine 10 mg oral tablet [PRN]    ProAir HFA 90 mcg/actuation Inhalation HFA Aerosol Inhaler [prn]    Advair HFA     baclofen         Objective:        Vitals:         Current: 2/21/2020 9:36:28 AM    Ht:  5 ft, 1.5 in;  Wt: 177.4 lbs;  BMI: 33.0T: 97.5 F (oral);  BP: 134/85 mm Hg (right arm, sitting);  P: 100 bpm (right arm (BP Cuff), sitting);  sCr: 0.94 mg/dL;  GFR: 65.16        Exams:     PHYSICAL EXAM:     GENERAL: no apparent distress;     E/N/T: OROPHARYNX: left lower, back tooth absent and exam normal in appearance;     NECK: thyroid is non-palpable;     RESPIRATORY: normal respiratory rate and pattern with no distress; normal breath sounds with no rales, rhonchi, wheezes or rubs;     CARDIOVASCULAR: normal rate; rhythm is regular;     Peripheral Pulses: posterior tibial: 2+ amplitude, no bruits; no edema;     MUSCULOSKELETAL:  Normal range of motion, strength and tone;     NEUROLOGIC: mental status: alert and oriented x 3; GROSSLY INTACT     PSYCHIATRIC:  appropriate affect and demeanor; normal speech pattern; grossly normal memory;         Lab/Test Results:         Urine temperature: confirmed (02/21/2020),     All urine drug screen levels confirmed negative: yes (02/21/2020),     Date and time of last pill: gabapentin and tramadool 2- at 11 pm (02/21/2020),     Performed by: tls (02/21/2020),     Collection Time: 1015 (02/21/2020),             Assessment:         F41.1   Generalized anxiety disorder       I10   Essential (primary) hypertension       M79.7   Fibromyalgia       Z79.899   Other long term (current) drug therapy       D64.9   Anemia, unspecified       E78.2   Mixed hyperlipidemia       K08.89   Other specified disorders of teeth and supporting structures       Z13.31   Encounter for screening for depression           ORDERS:         Meds Prescribed:       [Queued Refill] Effexor  mg oral Capsule, Extended Release 24 hr [Take 1 capsule(s) by mouth daily], #90 (ninety)  capsules, Refills: 1 (one)       [New Rx] Augmentin 875-125 mg oral tablet [take 1 tablet by oral route every 12 hours], #14 (fourteen) tablets, Refills: 0 (zero)       [Refilled] lisinopriL-hydrochlorothiazide 10-12.5 mg oral tablet [one a day], #90 (ninety) tablets, Refills: 1 (one)       [Refilled] Neurontin 300 mg oral capsule [One tablet PO TID PRN], #180 (one hundred and eighty) capsules, Refills: 1 (one)       [Refilled] Simvastatin 20 mg oral tablet [1 tab daily], #90 (ninety) tablets, Refills: 1 (one)         Radiology/Test Orders:       3017F  Colorectal CA screen results documented and reviewed (PV)  (In-House)              Lab Orders:       [id number]  HTNLP - [facility] CMP AND LIPID: [id number]  (Send-Out)            97706  Drug test prsmv read direct optical obs pr date  (In-House)            99510  BDCBC - [facility] CBC with 3 part diff  (Send-Out)            18504  FOL - [facility] Folate; folic acid serum  (Send-Out)            01326  IRONP - [facility] Iron and TIBC  (Send-Out)              Other Orders:         Depression screen negative  (In-House)              Screening mammogram results documented  (Send-Out)                      Plan:         Generalized anxiety disorder        RECOMMENDATIONS given include: avoidance of caffeine and stress reduction.            Prescriptions:       [Queued Refill] Effexor  mg oral Capsule, Extended Release 24 hr [Take 1 capsule(s) by mouth daily], #90 (ninety) capsules, Refills: 1 (one)         Essential (primary) hypertension    LABORATORY:  Labs ordered to be performed today include HTN/Lipid Panel: CMP, Lipid.      RECOMMENDATIONS given include: avoid pseudoephedrine or other stimulants/decongestants in common cold remedies, perform routine monitoring of blood pressure with home blood pressure cuff, exercise, reduction of dietary salt intake, take medication as prescribed, try not to miss doses, and weight loss.            Prescriptions:       [Refilled]  lisinopriL-hydrochlorothiazide 10-12.5 mg oral tablet [one a day], #90 (ninety) tablets, Refills: 1 (one)           Orders:       05713  HTNLP - [facility] CMP AND LIPID: [id number], [id number]  (Send-Out)              [facility]  to discuss hormone therapy with GYN.  pt agreeable.          Prescriptions:       [Refilled] Neurontin 300 mg oral capsule [One tablet PO TID PRN], #180 (one hundred and eighty) capsules, Refills: 1 (one)         Other long term (current) drug therapy          Orders:       82228  Drug test prsmv read direct optical obs pr date  (In-House)              Anemia, unspecified    LABORATORY:  Labs ordered to be performed today include Anemia profile CBC Folate Serum iron.            Orders:       [id number]  BDCBC - [facility] CBC with 3 part diff  (Send-Out)            10521  FOL - [facility] Folate; folic acid serum  (Send-Out)            89953  IRONP - [facility] Iron and TIBC  (Send-Out)              Mixed hyperlipidemia          Prescriptions:       [Refilled] Simvastatin 20 mg oral tablet [1 tab daily], #90 (ninety) tablets, Refills: 1 (one)         Other specified disorders of teeth and supporting structures        RECOMMENDATIONS given include: she is to contact her dentist..            Prescriptions:       [New Rx] Augmentin 875-125 mg oral tablet [take 1 tablet by oral route every 12 hours], #14 (fourteen) tablets, Refills: 0 (zero)         Encounter for screening for depression    MIPS PHQ-9 Depression Screening: Completed form scanned and in chart; Total Score 4; Negative Depression Screen           Orders:         Depression screen negative  (In-House)              Screening mammogram results documented  (Send-Out)            3017F  Colorectal CA screen results documented and reviewed (PV)  (In-House)                  Patient Recommendations:        For  Generalized anxiety disorder:    Try to avoid or reduce the amount of caffeine intake. Try stress reduction methods to reduce the  frequency or lessen the severity of anxiety episodes.          For  Essential (primary) hypertension:    Certain decongestants and stimulants (like pseudoephedrine) in common cold remedies raise blood pressure, sometimes to dangerously high levels. Never take with MAO inhibitors! Begin monitoring your blood pressure by brief nurse visits at our office, a home blood pressure monitor, or by checking on the machines in pharmacies or stores.  Keep a log of the readings. Maintain a regular exercise program. Reduce the amount of salt in your diet. Try to lose some weight; even modest weight reduction can improve your blood pressure.              Charge Capture:         Primary Diagnosis:     F41.1  Generalized anxiety disorder           Orders:      31800  Office/outpatient visit; established patient, level 4  (In-House)              I10  Essential (primary) hypertension     M79.7  Fibromyalgia     Z79.899  Other long term (current) drug therapy           Orders:      00706  Drug test prsmv read direct optical obs pr date  (In-House)              D64.9  Anemia, unspecified     E78.2  Mixed hyperlipidemia     K08.89  Other specified disorders of teeth and supporting structures     Z13.31  Encounter for screening for depression           Orders:        Depression screen negative  (In-House)            3017F  Colorectal CA screen results documented and reviewed (PV)  (In-House)

## 2021-05-18 NOTE — PROGRESS NOTES
Katia Spears 1960     Office/Outpatient Visit    Visit Date: Mon, Mar 4, 2019 08:56 am    Provider: Jennie Andre N.P. (Assistant: Ira Oseguera MA)    Location: [facility]        Electronically signed by Jennie Andre N.P. on  2019 08:40:02 AM                             SUBJECTIVE:        CC:     Mrs. Spears is a 58 year old White female.  This is a follow-up visit.  Patient presents today for six month follow up, also wants to discuss medications. (not taking Simvastatin);         HPI:         Mrs. Spears presents with hyperlipidemia.  Pt states her rheumatologist told her to stop taking simvastatin due to her muscle issues.          Concerning hypertension, states doing well on meds. Here for f/u. No refills needed.          Concerning fibromyalgia, other details: States wanting to get off gabapentin. She spoke to her rheumatologist and was told she could increas her effexor and get off gabapentin..          With regard to the chronic obstructive asthma, unspecified, pt was recently diagnosed by ENT x 1 month ago. States starting new meds and can tell she is feeling better.      ROS:     CONSTITUTIONAL:  Negative for chills, fatigue, fever, and weight change.      EYES:  Negative for blurred vision.      CARDIOVASCULAR:  Negative for chest pain, orthopnea, paroxysmal nocturnal dyspnea and pedal edema.      RESPIRATORY:  Negative for dyspnea.      GASTROINTESTINAL:  Negative for abdominal pain, constipation, diarrhea, nausea and vomiting.      MUSCULOSKELETAL:  Negative for arthralgias, back pain, and myalgias.      NEUROLOGICAL:  Negative for dizziness, headaches, paresthesias, and weakness.      PSYCHIATRIC:  Negative for anxiety, depression, and sleep disturbances.          PMH/FMH/SH:     Last Reviewed on 3/04/2019 09:08 AM by Jennie Andre    Past Medical History:             GYNECOLOGICAL HISTORY:     miscarriage 4         CURRENT MEDICAL PROVIDERS:     Obstetrician/Gynecologist    Rheumatologist         PREVENTIVE HEALTH MAINTENANCE             BONE DENSITY: was last done 2018 with the following abnormality noted-- osteopenia and osteoprosis     COLORECTAL CANCER SCREENING: colonoscopy with normal results     EYE EXAM: was last done - Dr. Fisher     MAMMOGRAM: was last done  with normal results     PAP SMEAR: was last done 2018 with normal results         Surgical History:         Cholecystectomy    : X 2;     Dilation and Curettage: multiple;     Procedures:    Colonoscopy ( --NEG;; )    LEEP procedure (  )    Treadmill stress test ( --NEG;; )         Family History:     Father:  at age 38; Cause of death was lung cancer     Mother:  at age 54; Cause of death was Shy-Draegers/Neurological Disorder     Brother(s): 3 brother(s) total;  Hyperlipidemia;  Schizophrenia     Sister(s): 5 sister(s) total;  Hyperlipidemia;  Type 2 Diabetes; thyroid disorder;  Rheumatoid Arthritis     Son(s): # sons total son(s) total;  Type 1 Diabetes     Positive for Coronary Artery Disease.          Social History:     Occupation: Homemaker     Marital Status:      Children: 2 children (ages 29/11 )         Tobacco/Alcohol/Supplements:     Last Reviewed on 3/04/2019 09:08 AM by Jennie Andre    Tobacco: She has a past history of cigarette smoking; quit date:  .          Alcohol: Frequency: Socially     Caffeine:  She admits to consuming caffeine via coffee ( 1 serving per day ).          Substance Abuse History:     Last Reviewed on 3/04/2019 09:08 AM by Jennie Andre    None         Mental Health History:     Last Reviewed on 3/04/2019 09:08 AM by Jennie Andre        Communicable Diseases (eg STDs):     Last Reviewed on 3/04/2019 09:08 AM by Jennie Andre            Current Problems:     Last Reviewed on 3/04/2019 09:08 AM by Jennie Andre    Hyperlipidemia     Vitamin D deficiency, unspecified     Chronic sinusitis,  unspecified     Hypertension     Anxiety     Obstructive sleep apnea     Chronic venous insufficiency     Fibromyalgia     Generalized osteoarthritis, multiple sites     GERD     Chronic low back pain     Postmenopausal osteoporosis     Post-menopausal state     Depression with anxiety     Shoulder pain         Immunizations:     Havrix -adult dose (HepA) 6/2/2018     Hep A, adult dose 12/11/2018     Fluzone Quadrivalent (3+ years) 10/8/2018     Influenza, split virus pf (3+ years dose) 9/19/2017         Allergies:     Last Reviewed on 3/04/2019 09:08 AM by Jennie Andre      No Known Drug Allergies.         Current Medications:     Last Reviewed on 3/04/2019 09:08 AM by Jennie Andre    Lisinopril/Hydrochlorothiazide 10mg/12.5mg Tablet one a day     Neurontin 300mg Capsules One tablet PO TID PRN     Effexor XR 75mg Capsules, Extended Release ONE AT HS     Simvastatin 20mg Tablet 1 tab daily     Ibuprofen 800mg Tablet Take 1 tablet(s) by mouth q8h prn     OTC Multivitamin Take one tablet once daily prn     Cyclobenzaprine HCl 10mg Tablet PRN     Flovent HFA 110mcg/1actuation Oral Inhaler 2 puffs bid     ProAir HFA 90mcg/1actuation Oral Inhaler prn         OBJECTIVE:        Vitals:         Current: 3/4/2019 9:02:26 AM    Ht:  5 ft, 1.5 in;  Wt: 174.8 lbs;  BMI: 32.5    T: 97.7 F (oral);  BP: 116/70 mm Hg (left arm, sitting);  P: 109 bpm (left arm (BP Cuff), sitting);  sCr: 0.89 mg/dL;  GFR: 69.23        Exams:     PHYSICAL EXAM:     GENERAL: vital signs recorded - well developed, well nourished;  no apparent distress;     EYES: extraocular movements intact; conjunctiva and cornea are normal; PERRLA;     NECK: range of motion is normal; thyroid is non-palpable;     RESPIRATORY: normal respiratory rate and pattern with no distress; normal breath sounds with no rales, rhonchi, wheezes or rubs;     CARDIOVASCULAR: normal rate; rhythm is regular;  no systolic murmur; no edema;     GASTROINTESTINAL: nontender;  normal bowel sounds; no organomegaly;     NEUROLOGICAL:  cranial nerves, motor and sensory function, reflexes, gait and coordination are all intact;     PSYCHIATRIC:  appropriate affect and demeanor; normal speech pattern; grossly normal memory;         ASSESSMENT:           272.4   J20.8  Hyperlipidemia              DDx:     401.1   I10  Hypertension              DDx:     729.1   M79.7  Fibromyalgia              DDx:     493.20   J44.9  Chronic obstructive asthma, unspecified              DDx:         ORDERS:         Meds Prescribed:       Refill of: Effexor XR (Venlafaxine HCl) 150mg Capsules, Extended Release Take 1 capsule(s) by mouth daily  #90 (Ninety) capsule(s) Refills: 1         Lab Orders:       18338  BDCBC - [facility] CBC with 3 part diff  (Send-Out)         66759  COMP - [facility] Comp. Metabolic Panel  (Send-Out)         98629  LPDP - [facility] Lipid Panel  (Send-Out)         87922  THYII - [facility] Thyroid panel with TSH (63335, 59546)  (Send-Out)         60357  AMSA - [facility] Microsomal Antibodies  (Send-Out)         82208  DYLAN - [facility] Thyroglobulin antibody  (Send-Out)                   PLAN:          Hyperlipidemia Pt decided to stay on simvastatin and add on coenzyme Q10.          Hypertension     LABORATORY:  Labs ordered to be performed today include CBC, Comprehensive metabolic panel, lipid panel, Thyroid Panel, and Thyroid Other ANTI MICROSOMAL ANTIBODY ANTITHYROGLOBULIN ANTIBODY.            Orders:       99181  BDCBC - [facility] CBC with 3 part diff  (Send-Out)         64909  COMP - [facility] Comp. Metabolic Panel  (Send-Out)         24956  LPDP - [facility] Lipid Panel  (Send-Out)         51157  THYII - [facility] Thyroid panel with TSH (55700, 75062)  (Send-Out)         61969  AMSA - [facility] Microsomal Antibodies  (Send-Out)         21713  DYLAN - [facility] Thyroglobulin antibody  (Send-Out)            Fibromyalgia wean gabapentin as discussed.           Prescriptions:       Refill of: Effexor XR (Venlafaxine HCl) 150mg Capsules, Extended Release Take 1  capsule(s) by mouth daily  #90 (Ninety) capsule(s) Refills: 1             CHARGE CAPTURE:           Primary Diagnosis:     272.4 Hyperlipidemia            J20.8    Acute bronchitis due to other specified organisms              Orders:          85641   Office/outpatient visit; established patient, level 4  (In-House)           401.1 Hypertension            I10    Essential (primary) hypertension    729.1 Fibromyalgia            M79.7    Fibromyalgia    493.20 Chronic obstructive asthma, unspecified            J44.9    Chronic obstructive pulmonary disease, unspecified

## 2021-06-08 DIAGNOSIS — M79.7 FIBROMYALGIA AFFECTING MULTIPLE SITES: Primary | ICD-10-CM

## 2021-06-08 RX ORDER — GABAPENTIN 300 MG/1
CAPSULE ORAL
Qty: 90 CAPSULE | Refills: 0 | Status: SHIPPED | OUTPATIENT
Start: 2021-06-08 | End: 2021-09-09

## 2021-06-15 ENCOUNTER — LAB (OUTPATIENT)
Dept: LAB | Facility: HOSPITAL | Age: 61
End: 2021-06-15

## 2021-06-15 ENCOUNTER — OFFICE VISIT (OUTPATIENT)
Dept: FAMILY MEDICINE CLINIC | Age: 61
End: 2021-06-15

## 2021-06-15 VITALS
DIASTOLIC BLOOD PRESSURE: 91 MMHG | WEIGHT: 160.8 LBS | TEMPERATURE: 98.7 F | HEIGHT: 61 IN | SYSTOLIC BLOOD PRESSURE: 130 MMHG | HEART RATE: 85 BPM | BODY MASS INDEX: 30.36 KG/M2

## 2021-06-15 DIAGNOSIS — R10.84 GENERALIZED ABDOMINAL PAIN: ICD-10-CM

## 2021-06-15 DIAGNOSIS — M54.2 NECK PAIN: ICD-10-CM

## 2021-06-15 DIAGNOSIS — F41.9 ANXIETY: ICD-10-CM

## 2021-06-15 DIAGNOSIS — R10.84 GENERALIZED ABDOMINAL PAIN: Primary | ICD-10-CM

## 2021-06-15 PROBLEM — G47.33 OSA (OBSTRUCTIVE SLEEP APNEA): Status: ACTIVE | Noted: 2021-06-15

## 2021-06-15 PROBLEM — M79.7 FIBROMYALGIA: Status: ACTIVE | Noted: 2021-06-15

## 2021-06-15 PROBLEM — Z87.891 PERSONAL HISTORY OF TOBACCO USE, PRESENTING HAZARDS TO HEALTH: Status: ACTIVE | Noted: 2021-06-15

## 2021-06-15 PROBLEM — Z79.899 ENCOUNTER FOR LONG-TERM (CURRENT) USE OF OTHER MEDICATIONS: Status: ACTIVE | Noted: 2021-06-15

## 2021-06-15 PROBLEM — J34.2 DEVIATED NASAL SEPTUM: Status: ACTIVE | Noted: 2021-06-15

## 2021-06-15 PROBLEM — D64.9 ANEMIA: Status: ACTIVE | Noted: 2021-06-15

## 2021-06-15 PROBLEM — J44.9 COPD (CHRONIC OBSTRUCTIVE PULMONARY DISEASE) (HCC): Status: ACTIVE | Noted: 2021-06-15

## 2021-06-15 PROBLEM — I10 HTN (HYPERTENSION): Status: ACTIVE | Noted: 2021-06-15

## 2021-06-15 LAB
ALBUMIN SERPL-MCNC: 4.4 G/DL (ref 3.5–5.2)
ALP SERPL-CCNC: 88 U/L (ref 39–117)
ALT SERPL W P-5'-P-CCNC: 30 U/L (ref 1–33)
AMYLASE SERPL-CCNC: 57 U/L (ref 28–100)
AST SERPL-CCNC: 23 U/L (ref 1–32)
BASOPHILS # BLD AUTO: 0 10*3/MM3 (ref 0–0.2)
BASOPHILS NFR BLD AUTO: 0 % (ref 0–1.5)
BILIRUB CONJ SERPL-MCNC: <0.2 MG/DL (ref 0–0.3)
BILIRUB INDIRECT SERPL-MCNC: NORMAL MG/DL
BILIRUB SERPL-MCNC: 0.3 MG/DL (ref 0–1.2)
BILIRUB UR QL STRIP: NEGATIVE
CLARITY UR: CLEAR
COLOR UR: YELLOW
DEPRECATED RDW RBC AUTO: 42 FL (ref 37–54)
EOSINOPHIL # BLD AUTO: 0.01 10*3/MM3 (ref 0–0.4)
EOSINOPHIL NFR BLD AUTO: 0.2 % (ref 0.3–6.2)
ERYTHROCYTE [DISTWIDTH] IN BLOOD BY AUTOMATED COUNT: 12.9 % (ref 12.3–15.4)
GLUCOSE UR STRIP-MCNC: NEGATIVE MG/DL
HCT VFR BLD AUTO: 37.7 % (ref 34–46.6)
HGB BLD-MCNC: 12.5 G/DL (ref 12–15.9)
HGB UR QL STRIP.AUTO: NEGATIVE
IMM GRANULOCYTES # BLD AUTO: 0.01 10*3/MM3 (ref 0–0.05)
IMM GRANULOCYTES NFR BLD AUTO: 0.2 % (ref 0–0.5)
KETONES UR QL STRIP: NEGATIVE
LEUKOCYTE ESTERASE UR QL STRIP.AUTO: NEGATIVE
LIPASE SERPL-CCNC: 38 U/L (ref 13–60)
LYMPHOCYTES # BLD AUTO: 1.98 10*3/MM3 (ref 0.7–3.1)
LYMPHOCYTES NFR BLD AUTO: 38.6 % (ref 19.6–45.3)
MCH RBC QN AUTO: 29.6 PG (ref 26.6–33)
MCHC RBC AUTO-ENTMCNC: 33.2 G/DL (ref 31.5–35.7)
MCV RBC AUTO: 89.1 FL (ref 79–97)
MONOCYTES # BLD AUTO: 0.39 10*3/MM3 (ref 0.1–0.9)
MONOCYTES NFR BLD AUTO: 7.6 % (ref 5–12)
NEUTROPHILS NFR BLD AUTO: 2.74 10*3/MM3 (ref 1.7–7)
NEUTROPHILS NFR BLD AUTO: 53.4 % (ref 42.7–76)
NITRITE UR QL STRIP: NEGATIVE
PH UR STRIP.AUTO: 7 [PH] (ref 5–8)
PLATELET # BLD AUTO: 411 10*3/MM3 (ref 140–450)
PMV BLD AUTO: 8.5 FL (ref 6–12)
PROT SERPL-MCNC: 6.9 G/DL (ref 6–8.5)
PROT UR QL STRIP: NEGATIVE
RBC # BLD AUTO: 4.23 10*6/MM3 (ref 3.77–5.28)
SP GR UR STRIP: 1.02 (ref 1–1.03)
UREA BREATH TEST QL: NEGATIVE
UROBILINOGEN UR QL STRIP: NORMAL
WBC # BLD AUTO: 5.13 10*3/MM3 (ref 3.4–10.8)

## 2021-06-15 PROCEDURE — 80076 HEPATIC FUNCTION PANEL: CPT | Performed by: NURSE PRACTITIONER

## 2021-06-15 PROCEDURE — 99214 OFFICE O/P EST MOD 30 MIN: CPT | Performed by: NURSE PRACTITIONER

## 2021-06-15 PROCEDURE — 83013 H PYLORI (C-13) BREATH: CPT

## 2021-06-15 PROCEDURE — 83690 ASSAY OF LIPASE: CPT

## 2021-06-15 PROCEDURE — 85025 COMPLETE CBC W/AUTO DIFF WBC: CPT

## 2021-06-15 PROCEDURE — 81003 URINALYSIS AUTO W/O SCOPE: CPT

## 2021-06-15 PROCEDURE — 82150 ASSAY OF AMYLASE: CPT

## 2021-06-15 PROCEDURE — 36415 COLL VENOUS BLD VENIPUNCTURE: CPT

## 2021-06-15 RX ORDER — MELOXICAM 15 MG/1
TABLET ORAL
COMMUNITY
Start: 2021-05-10 | End: 2022-03-25

## 2021-06-15 RX ORDER — SIMVASTATIN 20 MG
TABLET ORAL
COMMUNITY
End: 2021-06-24 | Stop reason: SDUPTHER

## 2021-06-15 RX ORDER — MONTELUKAST SODIUM 10 MG/1
10 TABLET ORAL NIGHTLY
COMMUNITY
Start: 2021-05-14 | End: 2022-10-11 | Stop reason: SDUPTHER

## 2021-06-15 RX ORDER — ALBUTEROL SULFATE 90 UG/1
1 AEROSOL, METERED RESPIRATORY (INHALATION) EVERY 6 HOURS PRN
COMMUNITY
Start: 2021-06-02

## 2021-06-15 RX ORDER — IBUPROFEN 800 MG/1
TABLET ORAL
COMMUNITY
End: 2022-08-08

## 2021-06-15 RX ORDER — VENLAFAXINE HYDROCHLORIDE 150 MG/1
150 CAPSULE, EXTENDED RELEASE ORAL DAILY
Qty: 90 CAPSULE | Refills: 1 | Status: SHIPPED | OUTPATIENT
Start: 2021-06-15 | End: 2022-03-25 | Stop reason: ALTCHOICE

## 2021-06-15 RX ORDER — VENLAFAXINE HYDROCHLORIDE 150 MG/1
CAPSULE, EXTENDED RELEASE ORAL
COMMUNITY
Start: 2021-05-10 | End: 2021-06-15

## 2021-06-15 RX ORDER — LISINOPRIL AND HYDROCHLOROTHIAZIDE 12.5; 1 MG/1; MG/1
TABLET ORAL
COMMUNITY
Start: 2021-06-12 | End: 2021-06-15 | Stop reason: SDUPTHER

## 2021-06-15 RX ORDER — EPINEPHRINE 0.3 MG/.3ML
INJECTION SUBCUTANEOUS
COMMUNITY
Start: 2021-06-02 | End: 2022-07-19

## 2021-06-15 RX ORDER — BACLOFEN 10 MG/1
TABLET ORAL
COMMUNITY
Start: 2021-05-06 | End: 2021-08-02

## 2021-06-15 RX ORDER — INHALER, ASSIST DEVICES
SPACER (EA) MISCELLANEOUS
COMMUNITY
Start: 2021-06-05 | End: 2022-10-11

## 2021-06-15 NOTE — ASSESSMENT & PLAN NOTE
Scheduling ultrasound of the neck.  She denies any concerns for ill contacts or symptoms of strep or Covid 19.

## 2021-06-15 NOTE — PROGRESS NOTES
"Chief Complaint  Hyperlipidemia, Follow-up, Sore Throat (outside of throat is swollen and sore to touch), and Abdominal Pain (off and on for a few months, lasting longer more recently)  Katia is a 60-year-old female here for generalized abdominal pain for more than 4 weeks intermittently.  History of constipation is stable.  Denies nausea vomiting or diarrhea.  Afebrile.  History cholecystectomy.  Anxiety is stable and request refills of Effexor.  Denies side effects.  Right exterior neck anteriorly is painful and tender to touch intermittently over the last 1 month.  Denies sore throat heartburn or acid reflux.  Or injury.  Subjective          Katia Spears presents to [facility]    Review of Systems   Constitutional: Negative.    HENT: Negative.  Negative for sore throat.    Respiratory: Negative.    Cardiovascular: Negative.    Gastrointestinal: Positive for abdominal pain and constipation. Negative for abdominal distention, diarrhea, nausea, rectal pain and vomiting.   Genitourinary: Negative.    Musculoskeletal: Positive for myalgias.   Neurological: Negative.         Objective   Vital Signs:   Vitals:    06/15/21 0941 06/15/21 0947   BP: 141/66 130/91   BP Location: Right arm Right arm   Patient Position: Sitting Sitting   Pulse: 81 85   Temp: 98.7 °F (37.1 °C)    TempSrc: Oral    Weight: 72.9 kg (160 lb 12.8 oz)    Height: 156.2 cm (61.5\")       Physical Exam  Constitutional:       Appearance: She is obese.   HENT:      Right Ear: Tympanic membrane normal.      Left Ear: Tympanic membrane normal.      Mouth/Throat:      Mouth: Mucous membranes are moist.      Pharynx: Oropharynx is clear. No oropharyngeal exudate or posterior oropharyngeal erythema.   Neck:      Thyroid: Thyroid tenderness present. No thyroid mass or thyromegaly.   Cardiovascular:      Rate and Rhythm: Normal rate and regular rhythm.      Heart sounds: Normal heart sounds.   Pulmonary:      Effort: Pulmonary " effort is normal.      Breath sounds: Normal breath sounds.   Abdominal:      General: Bowel sounds are normal.      Palpations: Abdomen is soft. There is no mass.      Tenderness: There is no abdominal tenderness. There is no guarding or rebound.   Musculoskeletal:         General: Normal range of motion.   Lymphadenopathy:      Cervical: No cervical adenopathy.   Skin:     General: Skin is warm and dry.   Neurological:      General: No focal deficit present.      Mental Status: She is alert and oriented to person, place, and time.   Psychiatric:         Mood and Affect: Mood normal.         Behavior: Behavior normal.          Result Review :              Assessment and Plan    Diagnoses and all orders for this visit:    1. Generalized abdominal pain (Primary)  Assessment & Plan:  To ER if worsens.  Labs pending.    Orders:  -     CBC w AUTO Differential; Future  -     Hepatic Function Panel  -     Amylase; Future  -     Lipase; Future  -     Urinalysis With Culture If Indicated -; Future  -     H. Pylori Breath Test - Breath, Lung; Future    2. Anxiety  Assessment & Plan:  Continue Effexor.    Orders:  -     venlafaxine XR (EFFEXOR-XR) 150 MG 24 hr capsule; Take 1 capsule by mouth Daily.  Dispense: 90 capsule; Refill: 1    3. Neck pain  Assessment & Plan:  Scheduling ultrasound of the neck.  She denies any concerns for ill contacts or symptoms of strep or Covid 19.    Orders:  -     US Head Neck Soft Tissue; Future      Follow Up    No follow-ups on file.  Patient was given instructions and counseling regarding her condition or for health maintenance advice. Please see specific information pulled into the AVS if appropriate.

## 2021-06-16 RX ORDER — LISINOPRIL AND HYDROCHLOROTHIAZIDE 12.5; 1 MG/1; MG/1
1 TABLET ORAL DAILY
Qty: 90 TABLET | Refills: 1 | Status: SHIPPED | OUTPATIENT
Start: 2021-06-16 | End: 2021-08-25

## 2021-06-24 RX ORDER — SIMVASTATIN 20 MG
20 TABLET ORAL NIGHTLY
Qty: 90 TABLET | Refills: 1 | Status: SHIPPED | OUTPATIENT
Start: 2021-06-24 | End: 2022-03-25 | Stop reason: SDUPTHER

## 2021-06-25 ENCOUNTER — HOSPITAL ENCOUNTER (OUTPATIENT)
Dept: ULTRASOUND IMAGING | Facility: HOSPITAL | Age: 61
Discharge: HOME | End: 2021-06-25
Admitting: NURSE PRACTITIONER

## 2021-06-25 DIAGNOSIS — M54.2 NECK PAIN: ICD-10-CM

## 2021-06-25 PROCEDURE — 76536 US EXAM OF HEAD AND NECK: CPT

## 2021-07-01 VITALS
WEIGHT: 175 LBS | HEIGHT: 62 IN | DIASTOLIC BLOOD PRESSURE: 81 MMHG | HEART RATE: 88 BPM | SYSTOLIC BLOOD PRESSURE: 131 MMHG | TEMPERATURE: 97.2 F | BODY MASS INDEX: 32.2 KG/M2

## 2021-07-01 VITALS
SYSTOLIC BLOOD PRESSURE: 135 MMHG | BODY MASS INDEX: 32.17 KG/M2 | DIASTOLIC BLOOD PRESSURE: 83 MMHG | WEIGHT: 174.8 LBS | HEIGHT: 62 IN | HEART RATE: 90 BPM | TEMPERATURE: 97.6 F

## 2021-07-01 VITALS
SYSTOLIC BLOOD PRESSURE: 116 MMHG | TEMPERATURE: 97.7 F | HEIGHT: 62 IN | BODY MASS INDEX: 32.17 KG/M2 | HEART RATE: 109 BPM | WEIGHT: 174.8 LBS | DIASTOLIC BLOOD PRESSURE: 70 MMHG

## 2021-07-01 VITALS
HEIGHT: 62 IN | DIASTOLIC BLOOD PRESSURE: 47 MMHG | BODY MASS INDEX: 32.54 KG/M2 | HEART RATE: 89 BPM | TEMPERATURE: 97.9 F | WEIGHT: 176.8 LBS | SYSTOLIC BLOOD PRESSURE: 105 MMHG

## 2021-07-01 VITALS
SYSTOLIC BLOOD PRESSURE: 130 MMHG | TEMPERATURE: 98.4 F | DIASTOLIC BLOOD PRESSURE: 84 MMHG | HEIGHT: 62 IN | BODY MASS INDEX: 31.91 KG/M2 | WEIGHT: 173.4 LBS | HEART RATE: 85 BPM

## 2021-07-01 VITALS
HEART RATE: 84 BPM | WEIGHT: 175.5 LBS | HEIGHT: 62 IN | TEMPERATURE: 98.7 F | BODY MASS INDEX: 32.3 KG/M2 | DIASTOLIC BLOOD PRESSURE: 91 MMHG | SYSTOLIC BLOOD PRESSURE: 136 MMHG

## 2021-07-02 VITALS
SYSTOLIC BLOOD PRESSURE: 131 MMHG | WEIGHT: 164 LBS | HEIGHT: 62 IN | BODY MASS INDEX: 30.18 KG/M2 | TEMPERATURE: 98.2 F | HEART RATE: 128 BPM | DIASTOLIC BLOOD PRESSURE: 96 MMHG

## 2021-07-02 VITALS
TEMPERATURE: 97.5 F | HEIGHT: 62 IN | SYSTOLIC BLOOD PRESSURE: 134 MMHG | DIASTOLIC BLOOD PRESSURE: 85 MMHG | WEIGHT: 177.4 LBS | BODY MASS INDEX: 32.65 KG/M2 | HEART RATE: 100 BPM

## 2021-07-02 VITALS
SYSTOLIC BLOOD PRESSURE: 132 MMHG | BODY MASS INDEX: 17.79 KG/M2 | WEIGHT: 96.7 LBS | HEART RATE: 89 BPM | DIASTOLIC BLOOD PRESSURE: 85 MMHG | HEIGHT: 62 IN

## 2021-07-02 VITALS
DIASTOLIC BLOOD PRESSURE: 74 MMHG | HEART RATE: 83 BPM | TEMPERATURE: 97 F | HEIGHT: 62 IN | BODY MASS INDEX: 30 KG/M2 | WEIGHT: 163 LBS | SYSTOLIC BLOOD PRESSURE: 127 MMHG

## 2021-08-02 RX ORDER — BACLOFEN 10 MG/1
TABLET ORAL
Qty: 30 TABLET | Refills: 3 | Status: SHIPPED | OUTPATIENT
Start: 2021-08-02 | End: 2022-02-03

## 2021-08-24 DIAGNOSIS — I10 HYPERTENSION, UNSPECIFIED TYPE: Primary | ICD-10-CM

## 2021-08-25 RX ORDER — LISINOPRIL AND HYDROCHLOROTHIAZIDE 12.5; 1 MG/1; MG/1
TABLET ORAL
Qty: 90 TABLET | Refills: 0 | Status: SHIPPED | OUTPATIENT
Start: 2021-08-25 | End: 2021-12-03

## 2021-09-08 DIAGNOSIS — M79.7 FIBROMYALGIA AFFECTING MULTIPLE SITES: ICD-10-CM

## 2021-09-09 RX ORDER — GABAPENTIN 300 MG/1
CAPSULE ORAL
Qty: 90 CAPSULE | Refills: 2 | Status: SHIPPED | OUTPATIENT
Start: 2021-09-09 | End: 2022-02-03

## 2021-12-02 DIAGNOSIS — I10 HYPERTENSION, UNSPECIFIED TYPE: ICD-10-CM

## 2021-12-03 RX ORDER — LISINOPRIL AND HYDROCHLOROTHIAZIDE 12.5; 1 MG/1; MG/1
TABLET ORAL
Qty: 90 TABLET | Refills: 0 | Status: SHIPPED | OUTPATIENT
Start: 2021-12-03 | End: 2022-03-11

## 2022-02-02 DIAGNOSIS — M79.7 FIBROMYALGIA AFFECTING MULTIPLE SITES: ICD-10-CM

## 2022-02-03 RX ORDER — GABAPENTIN 300 MG/1
CAPSULE ORAL
Qty: 90 CAPSULE | Refills: 0 | Status: SHIPPED | OUTPATIENT
Start: 2022-02-03 | End: 2022-03-25 | Stop reason: SDUPTHER

## 2022-02-03 RX ORDER — BACLOFEN 10 MG/1
TABLET ORAL
Qty: 30 TABLET | Refills: 0 | Status: SHIPPED | OUTPATIENT
Start: 2022-02-03 | End: 2022-03-11

## 2022-03-09 DIAGNOSIS — I10 HYPERTENSION, UNSPECIFIED TYPE: ICD-10-CM

## 2022-03-11 RX ORDER — LISINOPRIL AND HYDROCHLOROTHIAZIDE 12.5; 1 MG/1; MG/1
TABLET ORAL
Qty: 30 TABLET | Refills: 0 | Status: SHIPPED | OUTPATIENT
Start: 2022-03-11 | End: 2022-03-25 | Stop reason: SDUPTHER

## 2022-03-11 RX ORDER — BACLOFEN 10 MG/1
10 TABLET ORAL 3 TIMES DAILY PRN
Qty: 30 TABLET | Refills: 0 | Status: SHIPPED | OUTPATIENT
Start: 2022-03-11 | End: 2022-03-25 | Stop reason: SDUPTHER

## 2022-03-25 ENCOUNTER — OFFICE VISIT (OUTPATIENT)
Dept: FAMILY MEDICINE CLINIC | Age: 62
End: 2022-03-25

## 2022-03-25 VITALS
OXYGEN SATURATION: 99 % | DIASTOLIC BLOOD PRESSURE: 78 MMHG | BODY MASS INDEX: 32.39 KG/M2 | SYSTOLIC BLOOD PRESSURE: 135 MMHG | WEIGHT: 176 LBS | HEIGHT: 62 IN | HEART RATE: 82 BPM

## 2022-03-25 DIAGNOSIS — Z12.11 COLON CANCER SCREENING: ICD-10-CM

## 2022-03-25 DIAGNOSIS — Z79.899 ENCOUNTER FOR LONG-TERM (CURRENT) USE OF OTHER MEDICATIONS: ICD-10-CM

## 2022-03-25 DIAGNOSIS — D64.9 ANEMIA, UNSPECIFIED TYPE: ICD-10-CM

## 2022-03-25 DIAGNOSIS — M79.7 FIBROMYALGIA: ICD-10-CM

## 2022-03-25 DIAGNOSIS — E78.2 MIXED HYPERLIPIDEMIA: ICD-10-CM

## 2022-03-25 DIAGNOSIS — R00.0 TACHYCARDIA: ICD-10-CM

## 2022-03-25 DIAGNOSIS — I10 PRIMARY HYPERTENSION: ICD-10-CM

## 2022-03-25 DIAGNOSIS — K59.00 CONSTIPATION, UNSPECIFIED CONSTIPATION TYPE: ICD-10-CM

## 2022-03-25 DIAGNOSIS — F41.9 ANXIETY: Primary | ICD-10-CM

## 2022-03-25 DIAGNOSIS — E55.9 VITAMIN D DEFICIENCY: ICD-10-CM

## 2022-03-25 LAB
AMPHET+METHAMPHET UR QL: NEGATIVE
AMPHETAMINES UR QL: NEGATIVE
BARBITURATES UR QL SCN: NEGATIVE
BENZODIAZ UR QL SCN: NEGATIVE
BUPRENORPHINE SERPL-MCNC: NEGATIVE NG/ML
CANNABINOIDS SERPL QL: NEGATIVE
COCAINE UR QL: NEGATIVE
EXPIRATION DATE: NORMAL
Lab: NORMAL
MDMA UR QL SCN: NEGATIVE
METHADONE UR QL SCN: NEGATIVE
OPIATES UR QL: NEGATIVE
OXYCODONE UR QL SCN: NEGATIVE
PCP UR QL SCN: NEGATIVE

## 2022-03-25 PROCEDURE — 99214 OFFICE O/P EST MOD 30 MIN: CPT | Performed by: NURSE PRACTITIONER

## 2022-03-25 PROCEDURE — 93000 ELECTROCARDIOGRAM COMPLETE: CPT | Performed by: NURSE PRACTITIONER

## 2022-03-25 PROCEDURE — 80305 DRUG TEST PRSMV DIR OPT OBS: CPT | Performed by: NURSE PRACTITIONER

## 2022-03-25 RX ORDER — DULOXETIN HYDROCHLORIDE 60 MG/1
60 CAPSULE, DELAYED RELEASE ORAL DAILY
Qty: 30 CAPSULE | Refills: 5 | Status: SHIPPED | OUTPATIENT
Start: 2022-03-25 | End: 2022-09-19

## 2022-03-25 RX ORDER — GABAPENTIN 300 MG/1
300 CAPSULE ORAL 3 TIMES DAILY PRN
Qty: 270 CAPSULE | Refills: 1 | Status: SHIPPED | OUTPATIENT
Start: 2022-03-25 | End: 2022-10-11 | Stop reason: SDUPTHER

## 2022-03-25 RX ORDER — CETIRIZINE HYDROCHLORIDE 10 MG/1
10 TABLET ORAL NIGHTLY
COMMUNITY
Start: 2022-02-09

## 2022-03-25 RX ORDER — SIMVASTATIN 20 MG
20 TABLET ORAL NIGHTLY
Qty: 90 TABLET | Refills: 1 | Status: SHIPPED | OUTPATIENT
Start: 2022-03-25 | End: 2022-10-11 | Stop reason: SDUPTHER

## 2022-03-25 RX ORDER — LISINOPRIL AND HYDROCHLOROTHIAZIDE 12.5; 1 MG/1; MG/1
1 TABLET ORAL DAILY
Qty: 90 TABLET | Refills: 1 | Status: SHIPPED | OUTPATIENT
Start: 2022-03-25 | End: 2022-10-11 | Stop reason: SDUPTHER

## 2022-03-25 RX ORDER — LIFITEGRAST 50 MG/ML
1 SOLUTION/ DROPS OPHTHALMIC AS NEEDED
Status: ON HOLD | COMMUNITY
Start: 2022-03-09 | End: 2022-08-11

## 2022-03-25 RX ORDER — FLUTICASONE PROPIONATE 50 MCG
1 SPRAY, SUSPENSION (ML) NASAL AS NEEDED
COMMUNITY
Start: 2022-01-07

## 2022-03-25 RX ORDER — FLUTICASONE PROPIONATE AND SALMETEROL XINAFOATE 115; 21 UG/1; UG/1
2 AEROSOL, METERED RESPIRATORY (INHALATION)
COMMUNITY
Start: 2022-02-09

## 2022-03-25 RX ORDER — BACLOFEN 10 MG/1
10 TABLET ORAL 3 TIMES DAILY PRN
Qty: 90 TABLET | Refills: 0 | Status: SHIPPED | OUTPATIENT
Start: 2022-03-25 | End: 2022-06-13

## 2022-03-25 NOTE — ASSESSMENT & PLAN NOTE
To review EKG with physician.  Consider Holter monitor.  Declines Holter monitor today.  Avoid caffeine and Sudafed.

## 2022-03-25 NOTE — PROGRESS NOTES
Answers for HPI/ROS submitted by the patient on 3/22/2022  Please describe your symptoms.: medicine review    questions concerning possible causes of small hemorrhages eye dr discovered and told me to mention     miscellaneous questions about abdominal pain and fast heart rate  Have you had these symptoms before?: Yes  How long have you been having these symptoms?: Greater than 2 weeks  What is the primary reason for your visit?: Other    Chief Complaint  Anxiety (Follow up - med refills), Hypertension, and Hyperlipidemia    Subjective  Patient is a 61-year-old female who is here today to follow-up on anxiety.  Does not feel as if Effexor 150 mg daily is as effective as it once was.  Almost feels like she may have anxiety attack from time to time like she did prior to starting medication.  She previously tried Celexa prior to Effexor.  She also has fibromyalgia and does not recall ever trying Cymbalta.    Reports fast heart rate noted most often at rest and intermittent for more than several months.  She has been watching it with a [employer] or apple watch.  She denies chest pain or heart skipping a beat or palpitations.  Has been a long time since she has had an EKG done.  She denies chest pain.    Chronic constipation and last colonoscopy was in 2012.  We will treat constipation with stool softeners and occasionally Metamucil.  She has never seen a gastroenterologist.    For hypertension she takes lisinopril 10 mg with hydrochlorothiazide 12.5 mg once daily.  Denies side effects and requests refills.  With recent eye exam she was noted to have small bilateral hemorrhages of the eyes.  She was told to monitor blood pressure and blood sugar.  Patient states her blood pressure has been within normal limits and glucose per glucometer has been normal as well.    For hyperlipidemia she takes simvastatin 20 mg at bedtime.  Denies side effects and requests refills.    For fibromyalgia she takes baclofen 10 mg 3 times daily  "as needed and gabapentin 300 mg 3 times daily as needed.  Denies side effects and requests refills.    States 1 previous bone density scan noted osteopenia and follow-up bone density scan was normal.          Katia Spears presents to [facility]    Review of Systems   Constitutional: Negative.    Respiratory: Negative.    Cardiovascular: Negative for chest pain and palpitations.   Musculoskeletal: Positive for myalgias.        Objective   Vital Signs:   Vitals:    03/25/22 1057   BP: 135/78   BP Location: Left arm   Patient Position: Sitting   Cuff Size: Adult   Pulse: 82   SpO2: 99%   Weight: 79.8 kg (176 lb)   Height: 156.2 cm (61.5\")      Physical Exam  Vitals reviewed.   Constitutional:       General: She is not in acute distress.     Appearance: Normal appearance. She is well-developed.   Neck:      Thyroid: No thyroid mass, thyromegaly or thyroid tenderness.   Cardiovascular:      Rate and Rhythm: Normal rate and regular rhythm.      Heart sounds: Normal heart sounds.   Pulmonary:      Effort: Pulmonary effort is normal.      Breath sounds: Normal breath sounds.   Musculoskeletal:      Right lower leg: No edema.      Left lower leg: No edema.   Skin:     General: Skin is warm and dry.   Neurological:      General: No focal deficit present.      Mental Status: She is alert.   Psychiatric:         Attention and Perception: Attention normal.         Mood and Affect: Mood and affect normal.         Behavior: Behavior normal.          Result Review :     CMP    CMP 5/13/21 6/15/21   Glucose 91    BUN 13    Creatinine 0.89    Sodium 136    Potassium 4.3    Chloride 97 (A)    Calcium 9.5    Albumin  4.40   Total Bilirubin  0.3   Alkaline Phosphatase  88   AST (SGOT)  23   ALT (SGPT)  30   (A) Abnormal value            CBC    CBC 6/15/21   WBC 5.13   RBC 4.23   Hemoglobin 12.5   Hematocrit 37.7   MCV 89.1   MCH 29.6   MCHC 33.2   RDW 12.9   Platelets 411           CBC w/diff    CBC " w/Diff 6/15/21   WBC 5.13   RBC 4.23   Hemoglobin 12.5   Hematocrit 37.7   MCV 89.1   MCH 29.6   MCHC 33.2   RDW 12.9   Platelets 411   Neutrophil Rel % 53.4   Immature Granulocyte Rel % 0.2   Lymphocyte Rel % 38.6   Monocyte Rel % 7.6   Eosinophil Rel % 0.2 (A)   Basophil Rel % 0.0   (A) Abnormal value                             Assessment and Plan    Diagnoses and all orders for this visit:    1. Anxiety (Primary)  Assessment & Plan:  Effexor is not as effective.  Stop Effexor switch to different SNRI, Cymbalta, which can help anxiety and depression and fibromyalgia.  Follow-up if not improving    Orders:  -     DULoxetine (Cymbalta) 60 MG capsule; Take 1 capsule by mouth Daily.  Dispense: 30 capsule; Refill: 5    2. Anemia, unspecified type  -     CBC w AUTO Differential; Future    3. Fibromyalgia  -     DULoxetine (Cymbalta) 60 MG capsule; Take 1 capsule by mouth Daily.  Dispense: 30 capsule; Refill: 5  -     POC Urine Drug Screen [facility]  -     baclofen (LIORESAL) 10 MG tablet; Take 1 tablet by mouth 3 (Three) Times a Day As Needed for Muscle Spasms.  Dispense: 90 tablet; Refill: 0  -     gabapentin (NEURONTIN) 300 MG capsule; Take 1 capsule by mouth 3 (Three) Times a Day As Needed (fibromyalgia).  Dispense: 270 capsule; Refill: 1    4. Encounter for long-term (current) use of other medications  -     POC Urine Drug Screen [facility]    5. Primary hypertension  -     lisinopril-hydrochlorothiazide (PRINZIDE,ZESTORETIC) 10-12.5 MG per tablet; Take 1 tablet by mouth Daily.  Dispense: 90 tablet; Refill: 1  -     Comprehensive metabolic panel; Future  -     Lipid panel; Future    6. Tachycardia  Assessment & Plan:  To review EKG with physician.  Consider Holter monitor.  Declines Holter monitor today.  Avoid caffeine and Sudafed.    Orders:  -     ECG 12 Lead  -     TSH; Future    7. Constipation, unspecified constipation type  -     Ambulatory Referral to Gastroenterology    8. Colon cancer  screening  -     Ambulatory Referral to Gastroenterology    9. Mixed hyperlipidemia  -     simvastatin (ZOCOR) 20 MG tablet; Take 1 tablet by mouth Every Night.  Dispense: 90 tablet; Refill: 1  -     Comprehensive metabolic panel; Future  -     Lipid panel; Future    10. Vitamin D deficiency  -     Vitamin D 25 hydroxy; Future      Follow Up    No follow-ups on file.  Patient was given instructions and counseling regarding her condition or for health maintenance advice. Please see specific information pulled into the AVS if appropriate.

## 2022-03-25 NOTE — ASSESSMENT & PLAN NOTE
Effexor is not as effective.  Stop Effexor switch to different SNRI, Cymbalta, which can help anxiety and depression and fibromyalgia.  Follow-up if not improving

## 2022-04-01 ENCOUNTER — LAB (OUTPATIENT)
Dept: LAB | Facility: HOSPITAL | Age: 62
End: 2022-04-01

## 2022-04-01 DIAGNOSIS — R00.0 TACHYCARDIA: ICD-10-CM

## 2022-04-01 DIAGNOSIS — I10 PRIMARY HYPERTENSION: ICD-10-CM

## 2022-04-01 DIAGNOSIS — E78.2 MIXED HYPERLIPIDEMIA: ICD-10-CM

## 2022-04-01 DIAGNOSIS — D64.9 ANEMIA, UNSPECIFIED TYPE: ICD-10-CM

## 2022-04-01 DIAGNOSIS — E55.9 VITAMIN D DEFICIENCY: ICD-10-CM

## 2022-04-01 LAB
25(OH)D3 SERPL-MCNC: 38.6 NG/ML (ref 30–100)
ALBUMIN SERPL-MCNC: 4.5 G/DL (ref 3.5–5.2)
ALBUMIN/GLOB SERPL: 1.8 G/DL
ALP SERPL-CCNC: 97 U/L (ref 39–117)
ALT SERPL W P-5'-P-CCNC: 29 U/L (ref 1–33)
ANION GAP SERPL CALCULATED.3IONS-SCNC: 9.1 MMOL/L (ref 5–15)
AST SERPL-CCNC: 21 U/L (ref 1–32)
BASOPHILS # BLD AUTO: 0 10*3/MM3 (ref 0–0.2)
BASOPHILS NFR BLD AUTO: 0 % (ref 0–1.5)
BILIRUB SERPL-MCNC: 0.3 MG/DL (ref 0–1.2)
BUN SERPL-MCNC: 10 MG/DL (ref 8–23)
BUN/CREAT SERPL: 10.9 (ref 7–25)
CALCIUM SPEC-SCNC: 9.7 MG/DL (ref 8.6–10.5)
CHLORIDE SERPL-SCNC: 98 MMOL/L (ref 98–107)
CHOLEST SERPL-MCNC: 205 MG/DL (ref 0–200)
CO2 SERPL-SCNC: 28.9 MMOL/L (ref 22–29)
CREAT SERPL-MCNC: 0.92 MG/DL (ref 0.57–1)
DEPRECATED RDW RBC AUTO: 43.1 FL (ref 37–54)
EGFRCR SERPLBLD CKD-EPI 2021: 70.1 ML/MIN/1.73
EOSINOPHIL # BLD AUTO: 0.01 10*3/MM3 (ref 0–0.4)
EOSINOPHIL NFR BLD AUTO: 0.2 % (ref 0.3–6.2)
ERYTHROCYTE [DISTWIDTH] IN BLOOD BY AUTOMATED COUNT: 12.8 % (ref 12.3–15.4)
GLOBULIN UR ELPH-MCNC: 2.5 GM/DL
GLUCOSE SERPL-MCNC: 86 MG/DL (ref 65–99)
HCT VFR BLD AUTO: 39.2 % (ref 34–46.6)
HDLC SERPL-MCNC: 75 MG/DL (ref 40–60)
HGB BLD-MCNC: 12.7 G/DL (ref 12–15.9)
IMM GRANULOCYTES # BLD AUTO: 0.01 10*3/MM3 (ref 0–0.05)
IMM GRANULOCYTES NFR BLD AUTO: 0.2 % (ref 0–0.5)
LDLC SERPL CALC-MCNC: 115 MG/DL (ref 0–100)
LDLC/HDLC SERPL: 1.5 {RATIO}
LYMPHOCYTES # BLD AUTO: 1.76 10*3/MM3 (ref 0.7–3.1)
LYMPHOCYTES NFR BLD AUTO: 34.9 % (ref 19.6–45.3)
MCH RBC QN AUTO: 29.3 PG (ref 26.6–33)
MCHC RBC AUTO-ENTMCNC: 32.4 G/DL (ref 31.5–35.7)
MCV RBC AUTO: 90.3 FL (ref 79–97)
MONOCYTES # BLD AUTO: 0.41 10*3/MM3 (ref 0.1–0.9)
MONOCYTES NFR BLD AUTO: 8.1 % (ref 5–12)
NEUTROPHILS NFR BLD AUTO: 2.86 10*3/MM3 (ref 1.7–7)
NEUTROPHILS NFR BLD AUTO: 56.6 % (ref 42.7–76)
PLATELET # BLD AUTO: 414 10*3/MM3 (ref 140–450)
PMV BLD AUTO: 9.2 FL (ref 6–12)
POTASSIUM SERPL-SCNC: 4.2 MMOL/L (ref 3.5–5.2)
PROT SERPL-MCNC: 7 G/DL (ref 6–8.5)
RBC # BLD AUTO: 4.34 10*6/MM3 (ref 3.77–5.28)
SODIUM SERPL-SCNC: 136 MMOL/L (ref 136–145)
TRIGL SERPL-MCNC: 87 MG/DL (ref 0–150)
TSH SERPL DL<=0.05 MIU/L-ACNC: 2.67 UIU/ML (ref 0.27–4.2)
VLDLC SERPL-MCNC: 15 MG/DL (ref 5–40)
WBC NRBC COR # BLD: 5.05 10*3/MM3 (ref 3.4–10.8)

## 2022-04-01 PROCEDURE — 36415 COLL VENOUS BLD VENIPUNCTURE: CPT

## 2022-04-01 PROCEDURE — 82306 VITAMIN D 25 HYDROXY: CPT

## 2022-04-01 PROCEDURE — 80053 COMPREHEN METABOLIC PANEL: CPT

## 2022-04-01 PROCEDURE — 84443 ASSAY THYROID STIM HORMONE: CPT

## 2022-04-01 PROCEDURE — 85025 COMPLETE CBC W/AUTO DIFF WBC: CPT

## 2022-04-01 PROCEDURE — 80061 LIPID PANEL: CPT

## 2022-04-03 NOTE — PROGRESS NOTES
Normal blood sugar, kidney, liver and thyroid function.  Ou are not anemic.  Vitamin d is normal on current supplement.  Cholesterol is improved.

## 2022-04-25 ENCOUNTER — OFFICE VISIT (OUTPATIENT)
Dept: GASTROENTEROLOGY | Facility: CLINIC | Age: 62
End: 2022-04-25

## 2022-04-25 VITALS
SYSTOLIC BLOOD PRESSURE: 141 MMHG | DIASTOLIC BLOOD PRESSURE: 71 MMHG | HEART RATE: 89 BPM | BODY MASS INDEX: 32.46 KG/M2 | WEIGHT: 176.4 LBS | OXYGEN SATURATION: 100 % | HEIGHT: 62 IN

## 2022-04-25 DIAGNOSIS — R10.84 GENERALIZED ABDOMINAL PAIN: ICD-10-CM

## 2022-04-25 DIAGNOSIS — K59.04 CHRONIC IDIOPATHIC CONSTIPATION: Primary | ICD-10-CM

## 2022-04-25 DIAGNOSIS — R12 HEARTBURN: ICD-10-CM

## 2022-04-25 PROCEDURE — 99204 OFFICE O/P NEW MOD 45 MIN: CPT | Performed by: NURSE PRACTITIONER

## 2022-04-25 RX ORDER — POLYETHYLENE GLYCOL 3350, SODIUM SULFATE, SODIUM CHLORIDE, POTASSIUM CHLORIDE, ASCORBIC ACID, SODIUM ASCORBATE 140-9-5.2G
1 KIT ORAL TAKE AS DIRECTED
Qty: 1 EACH | Refills: 1 | Status: ON HOLD | OUTPATIENT
Start: 2022-04-25 | End: 2022-08-11

## 2022-04-25 RX ORDER — PANTOPRAZOLE SODIUM 20 MG/1
20 TABLET, DELAYED RELEASE ORAL DAILY
Qty: 30 TABLET | Refills: 3 | Status: SHIPPED | OUTPATIENT
Start: 2022-04-25 | End: 2022-09-12 | Stop reason: SDUPTHER

## 2022-04-25 NOTE — PROGRESS NOTES
"Patient Name: Katia Spears   Visit Date: 04/25/2022   Patient ID: [id number]  Provider: MIKE Muñoz    Sex: female  Location:  Location Address:  Location Phone: [phone] [facility] [address]  [facility] [address] [phone]    YOB: 1960      Primary Care Provider Esha Galeana APRN      Referring Provider: MIKE Salazar        Chief Complaint  Constipation (New patient constipation)    History of Present Illness  Katia Spears is a 61 y.o. who presents to [facility] on referral from MIKE Salazar for a gastroenterology evaluation of Constipation (New patient constipation).    Ms. Spears reports a history of constipation. Bowel movements normally 2x a week, \"if I am diana\". Has been using glycerin suppository a few times a week to help \"get things started\". Occasionally reports she becomes desperate and will have to use a laxative.  Sporadic abdominal pain all throughout that waxes and wanes.  Pain tends to be worse with constipation.  Describes \"pinches\" in her lower abdomen at times.  She does deal with chronic back pain and feels that occasionally the back pain will radiate to her right upper quadrant.  History of cholecystectomy. Last colonoscopy 10+ years ago. Reports it was normal, requesting records. Hx of colon cancer in Paternal grandmother.    Feels like foods sits in her stomach for a long time after eating. In return she feels an increase in reflux. Uses pepcid and gaviscon daily OTC with relief. Occasional use of TUMS as well. Taking ibuprofen off and on due to arthritis and fibromyalgia.  Occasional nausea as well however feels that is r/t constipation.  Denies any dysphagia, change in appetite, or unexplained weight loss.      Labs Result Review Imaging    Past Medical History:   Diagnosis Date   • Anemia, unspecified    • Chronic obstructive pulmonary disease, unspecified (HCC)    • Chronic sinusitis, unspecified    • " Deviated nasal septum    • Dysthymic disorder    • Essential (primary) hypertension    • Fibromyalgia    • Generalized anxiety disorder    • Hypo-osmolality and hyponatremia    • Mixed hyperlipidemia    • Obstructive sleep apnea (adult) (pediatric)    • Osteopenia    • Osteoporosis    • Other difficulties with micturition    • Other long term (current) drug therapy    • Vitamin D deficiency, unspecified        Past Surgical History:   Procedure Laterality Date   •  SECTION      X2   • CHOLECYSTECTOMY     • DILATATION AND CURETTAGE      MULTIPLE   • LEEP     • WRIST FRACTURE SURGERY Right 2019         Current Outpatient Medications:   •  Advair -21 MCG/ACT inhaler, , Disp: , Rfl:   •  albuterol sulfate  (90 Base) MCG/ACT inhaler, , Disp: , Rfl:   •  baclofen (LIORESAL) 10 MG tablet, Take 1 tablet by mouth 3 (Three) Times a Day As Needed for Muscle Spasms., Disp: 90 tablet, Rfl: 0  •  cetirizine (zyrTEC) 10 MG tablet, , Disp: , Rfl:   •  DULoxetine (Cymbalta) 60 MG capsule, Take 1 capsule by mouth Daily., Disp: 30 capsule, Rfl: 5  •  EPINEPHrine (EPIPEN) 0.3 MG/0.3ML solution auto-injector injection, , Disp: , Rfl:   •  fluticasone (FLONASE) 50 MCG/ACT nasal spray, , Disp: , Rfl:   •  gabapentin (NEURONTIN) 300 MG capsule, Take 1 capsule by mouth 3 (Three) Times a Day As Needed (fibromyalgia)., Disp: 270 capsule, Rfl: 1  •  ibuprofen (ADVIL,MOTRIN) 800 MG tablet, ibuprofen 800 mg oral tablet take 1 tablet (800 mg) by oral route 3 times per day with food   Active, Disp: , Rfl:   •  lisinopril-hydrochlorothiazide (PRINZIDE,ZESTORETIC) 10-12.5 MG per tablet, Take 1 tablet by mouth Daily., Disp: 90 tablet, Rfl: 1  •  montelukast (SINGULAIR) 10 MG tablet, , Disp: , Rfl:   •  simvastatin (ZOCOR) 20 MG tablet, Take 1 tablet by mouth Every Night., Disp: 90 tablet, Rfl: 1  •  Spacer/Aero-Holding Chambers (OptiChamber Karlee) misc, , Disp: , Rfl:   •  VITAMIN D, CHOLECALCIFEROL, PO, Take 5,000 Units  "by mouth., Disp: , Rfl:   •  Xiidra 5 % ophthalmic solution, , Disp: , Rfl:   •  linaclotide (Linzess) 145 MCG capsule capsule, Take 1 capsule by mouth Every Morning Before Breakfast for 90 days., Disp: 90 capsule, Rfl: 1  •  pantoprazole (PROTONIX) 20 MG EC tablet, Take 1 tablet by mouth Daily., Disp: 30 tablet, Rfl: 3  •  PEG-KCl-NaCl-NaSulf-Na Asc-C (Plenvu) 140 g reconstituted solution solution, Take 140 g by mouth Take As Directed., Disp: 1 each, Rfl: 1     No Known Allergies    Family History   Problem Relation Age of Onset   • Other Mother         Shy-Draegers/Neurological Disorder   • Lung cancer Father    • Hyperlipidemia Sister    • Diabetes type II Sister    • Thyroid disease Sister    • Rheum arthritis Sister    • Hyperlipidemia Brother    • Schizophrenia Brother    • Coronary artery disease Other    • Diabetes type I Son         Social History     Social History Narrative   • Not on file         Objective     Review of Systems   Gastrointestinal: Positive for abdominal pain, constipation and indigestion.        Vital Signs:   /71 (BP Location: Left arm, Patient Position: Sitting, Cuff Size: Large Adult)   Pulse 89   Ht 157.5 cm (62\")   Wt 80 kg (176 lb 6.4 oz)   SpO2 100%   BMI 32.26 kg/m²       Physical Exam  Constitutional:       General: She is not in acute distress.     Appearance: Normal appearance. She is well-developed and normal weight.   HENT:      Head: Normocephalic and atraumatic.   Eyes:      Conjunctiva/sclera: Conjunctivae normal.      Pupils: Pupils are equal, round, and reactive to light.      Visual Fields: Right eye visual fields normal and left eye visual fields normal.   Cardiovascular:      Rate and Rhythm: Normal rate and regular rhythm.      Heart sounds: Normal heart sounds.   Pulmonary:      Effort: Pulmonary effort is normal. No retractions.      Breath sounds: Normal breath sounds and air entry.   Abdominal:      General: Bowel sounds are normal. There is no " distension.      Palpations: Abdomen is soft.      Tenderness: There is no abdominal tenderness.      Comments: No appreciable hepatosplenomegaly or ascites   Musculoskeletal:         General: Normal range of motion.      Cervical back: Normal range of motion and neck supple.   Skin:     General: Skin is warm and dry.   Neurological:      Mental Status: She is alert and oriented to person, place, and time.   Psychiatric:         Mood and Affect: Mood and affect normal.         Behavior: Behavior normal.         Result Review :   The following data was reviewed by: MIKE Muñoz on 04/25/2022:  CBC w/diff    CBC w/Diff 6/15/21 4/1/22   WBC 5.13 5.05   RBC 4.23 4.34   Hemoglobin 12.5 12.7   Hematocrit 37.7 39.2   MCV 89.1 90.3   MCH 29.6 29.3   MCHC 33.2 32.4   RDW 12.9 12.8   Platelets 411 414   Neutrophil Rel % 53.4 56.6   Immature Granulocyte Rel % 0.2 0.2   Lymphocyte Rel % 38.6 34.9   Monocyte Rel % 7.6 8.1   Eosinophil Rel % 0.2 (A) 0.2 (A)   Basophil Rel % 0.0 0.0   (A) Abnormal value            CMP    CMP 5/13/21 6/15/21 4/1/22   Glucose 91  86   BUN 13  10   Creatinine 0.89  0.92   Sodium 136  136   Potassium 4.3  4.2   Chloride 97 (A)  98   Calcium 9.5  9.7   Albumin  4.40 4.50   Total Bilirubin  0.3 0.3   Alkaline Phosphatase  88 97   AST (SGOT)  23 21   ALT (SGPT)  30 29   (A) Abnormal value            Lipase   Date Value Ref Range Status   06/15/2021 38 13 - 60 U/L Final     Amylase   Date Value Ref Range Status   06/15/2021 57 28 - 100 U/L Final     Iron   Date Value Ref Range Status   03/30/2021 78 60 - 170 ug/dL Final     TIBC   Date Value Ref Range Status   03/30/2021 468 (H) 245 - 450 ug/dL Final     Comment:     As of 10/15/03, the chemistry department began utilizing a Transferrin  assay. Data suggests that Transferrin is a better estimate of Total Iron  binding capacity than the TIBC assay. As a result the TIBC will now be a  calculated estimate from the measured Transferrin.       Iron  Saturation   Date Value Ref Range Status   03/30/2021 17 (L) 20 - 55 % Final     Transferrin   Date Value Ref Range Status   03/30/2021 327.00 250.00 - 380.00 mg/dL Final     Ferritin   Date Value Ref Range Status   03/20/2019 63 10 - 200 ng/mL Final     Comment:     <10 ng/ml usually associated with Iron Deficiency Anemia.  Above normal range levels may be due to Hepatic and/or  Chronic Inflammatory Disease.               Assessment and Plan    Diagnoses and all orders for this visit:    1. Chronic idiopathic constipation (Primary)  -     Case Request; Standing  -     COVID PRE-OP / PRE-PROCEDURE SCREENING ORDER (NO ISOLATION) - Swab, Nasopharynx; Future  -     Case Request    2. Generalized abdominal pain  -     Case Request; Standing  -     COVID PRE-OP / PRE-PROCEDURE SCREENING ORDER (NO ISOLATION) - Swab, Nasopharynx; Future  -     Case Request    3. Heartburn  -     Case Request; Standing  -     COVID PRE-OP / PRE-PROCEDURE SCREENING ORDER (NO ISOLATION) - Swab, Nasopharynx; Future  -     Case Request    Other orders  -     Follow Anesthesia Guidelines / Protocol; Future  -     Obtain Informed Consent; Future  -     PEG-KCl-NaCl-NaSulf-Na Asc-C (Plenvu) 140 g reconstituted solution solution; Take 140 g by mouth Take As Directed.  Dispense: 1 each; Refill: 1  -     linaclotide (Linzess) 145 MCG capsule capsule; Take 1 capsule by mouth Every Morning Before Breakfast for 90 days.  Dispense: 90 capsule; Refill: 1  -     pantoprazole (PROTONIX) 20 MG EC tablet; Take 1 tablet by mouth Daily.  Dispense: 30 tablet; Refill: 3      ESOPHAGOGASTRODUODENOSCOPY (N/A), COLONOSCOPY (N/A)       Follow Up   Return for Follow up after procedure.  Patient was given instructions and counseling regarding her condition or for health maintenance advice. Please see specific information pulled into the AVS if appropriate.

## 2022-06-10 DIAGNOSIS — M79.7 FIBROMYALGIA: ICD-10-CM

## 2022-06-13 RX ORDER — BACLOFEN 10 MG/1
TABLET ORAL
Qty: 90 TABLET | Refills: 0 | Status: SHIPPED | OUTPATIENT
Start: 2022-06-13 | End: 2022-08-25

## 2022-06-13 NOTE — TELEPHONE ENCOUNTER
Rx Refill Note  Requested Prescriptions     Pending Prescriptions Disp Refills   • baclofen (LIORESAL) 10 MG tablet [Pharmacy Med Name: BACLOFEN 10 MG TABLET] 90 tablet 0     Sig: TAKE ONE TABLET BY MOUTH THREE TIMES A DAY AS NEEDED FOR MUSCLE SPASMS      Last office visit with prescribing clinician: 3/25/2022      Next office visit with prescribing clinician: 9/27/2022     Liliana Bah LPN  06/13/22, 14:17 EDT

## 2022-07-19 ENCOUNTER — HOSPITAL ENCOUNTER (OUTPATIENT)
Dept: GENERAL RADIOLOGY | Facility: HOSPITAL | Age: 62
Discharge: HOME | End: 2022-07-19
Admitting: NURSE PRACTITIONER

## 2022-07-19 ENCOUNTER — OFFICE VISIT (OUTPATIENT)
Dept: FAMILY MEDICINE CLINIC | Age: 62
End: 2022-07-19

## 2022-07-19 VITALS
OXYGEN SATURATION: 99 % | SYSTOLIC BLOOD PRESSURE: 143 MMHG | BODY MASS INDEX: 33.09 KG/M2 | WEIGHT: 179.8 LBS | DIASTOLIC BLOOD PRESSURE: 73 MMHG | HEART RATE: 95 BPM | HEIGHT: 62 IN

## 2022-07-19 DIAGNOSIS — R07.81 RIB PAIN ON LEFT SIDE: ICD-10-CM

## 2022-07-19 DIAGNOSIS — R07.81 RIB PAIN ON LEFT SIDE: Primary | ICD-10-CM

## 2022-07-19 DIAGNOSIS — I10 PRIMARY HYPERTENSION: ICD-10-CM

## 2022-07-19 PROCEDURE — 99214 OFFICE O/P EST MOD 30 MIN: CPT | Performed by: NURSE PRACTITIONER

## 2022-07-19 PROCEDURE — 71101 X-RAY EXAM UNILAT RIBS/CHEST: CPT

## 2022-07-19 RX ORDER — LIDOCAINE 50 MG/G
1 OINTMENT TOPICAL
Qty: 70.88 G | Refills: 0 | Status: SHIPPED | OUTPATIENT
Start: 2022-07-19 | End: 2022-08-08

## 2022-07-19 NOTE — ASSESSMENT & PLAN NOTE
Hypertension is elevated today due to pain.  Continue current treatment regimen.  Dietary sodium restriction.  Weight loss.  Regular aerobic exercise.  Continue current medications.  Ambulatory blood pressure monitoring.  Blood pressure will be reassessed at the next regular appointment.

## 2022-07-19 NOTE — PROGRESS NOTES
"Chief Complaint  Follow-up (Pt thinks she broke some ribs)    Subjective    Patient is a 61-year-old female in today with complaints of left-sided rib pain after a fall 1 week ago.  Patient reports she got up in the middle of the night time before she was fully awake, and fell into her dresser.  Patient reports pain is tolerable during the day, but because she sleeps on her left side, the pain is keeping her awake at nighttime. Pain worse with cough or sneeze. She ha tried ibuprofen with minimal relief.  Patient wants to get testing just to see if she does indeed have a fractured rib.  Denies difficulty breathing at this time.        Katia Spears presents to [facility]  Chest Pain   This is a new problem. The current episode started in the past 7 days. The problem occurs intermittently. The problem has been unchanged. The pain is at a severity of 3/10. The quality of the pain is described as stabbing. The pain does not radiate. Pertinent negatives include no shortness of breath or vomiting. The pain is aggravated by deep breathing.       Objective   Vital Signs:  /73   Pulse 95   Ht 157.5 cm (62\")   Wt 81.6 kg (179 lb 12.8 oz)   SpO2 99%   BMI 32.89 kg/m²   Estimated body mass index is 32.89 kg/m² as calculated from the following:    Height as of this encounter: 157.5 cm (62\").    Weight as of this encounter: 81.6 kg (179 lb 12.8 oz).          Physical Exam  HENT:      Head: Normocephalic.   Cardiovascular:      Rate and Rhythm: Normal rate.   Pulmonary:      Effort: Pulmonary effort is normal. No respiratory distress.      Breath sounds: Normal breath sounds. No stridor. No wheezing, rhonchi or rales.   Chest:      Chest wall: Tenderness present. No swelling or edema.   Skin:     General: Skin is dry.      Findings: Bruising present.          Neurological:      Mental Status: She is alert and oriented to person, place, and time.   Psychiatric:         Mood and Affect: " Mood normal.        Result Review :                Assessment and Plan   Diagnoses and all orders for this visit:    1. Rib pain on left side (Primary)  -     XR Ribs Left With PA Chest; Future  -     lidocaine (XYLOCAINE) 5 % ointment; Apply 1 application topically to the appropriate area as directed Every 2 (Two) Hours As Needed for Mild Pain .  Dispense: 70.88 g; Refill: 0    2. Primary hypertension  Assessment & Plan:  Hypertension is elevated today due to pain.  Continue current treatment regimen.  Dietary sodium restriction.  Weight loss.  Regular aerobic exercise.  Continue current medications.  Ambulatory blood pressure monitoring.  Blood pressure will be reassessed at the next regular appointment.             Follow Up   Return if symptoms worsen or fail to improve.  Patient was given instructions and counseling regarding her condition or for health maintenance advice. Please see specific information pulled into the AVS if appropriate.

## 2022-07-28 ENCOUNTER — TELEPHONE (OUTPATIENT)
Dept: GASTROENTEROLOGY | Facility: CLINIC | Age: 62
End: 2022-07-28

## 2022-08-05 ENCOUNTER — CLINICAL SUPPORT (OUTPATIENT)
Dept: FAMILY MEDICINE CLINIC | Age: 62
End: 2022-08-05

## 2022-08-05 DIAGNOSIS — K59.04 CHRONIC IDIOPATHIC CONSTIPATION: ICD-10-CM

## 2022-08-05 DIAGNOSIS — R12 HEARTBURN: ICD-10-CM

## 2022-08-05 DIAGNOSIS — R10.84 GENERALIZED ABDOMINAL PAIN: ICD-10-CM

## 2022-08-05 LAB — SARS-COV-2 RNA PNL SPEC NAA+PROBE: NOT DETECTED

## 2022-08-05 PROCEDURE — U0004 COV-19 TEST NON-CDC HGH THRU: HCPCS | Performed by: NURSE PRACTITIONER

## 2022-08-05 PROCEDURE — 99211 OFF/OP EST MAY X REQ PHY/QHP: CPT | Performed by: FAMILY MEDICINE

## 2022-08-08 NOTE — PRE-PROCEDURE INSTRUCTIONS
PT INSTRUCTED ON CLEAR LIQ DIET AND PO SPLIT PREP LAST BM SHOULD BE CLEAR  PT CAN TAKE GABAPENTIN, AND PROTONIX   WITH A SIP OF WATER IN THE AM OF THE PROCEDURE ARRIVAL TIME IS 1230 PM ON 8/11/22

## 2022-08-11 ENCOUNTER — HOSPITAL ENCOUNTER (OUTPATIENT)
Facility: HOSPITAL | Age: 62
Setting detail: HOSPITAL OUTPATIENT SURGERY
Discharge: HOME | End: 2022-08-11
Attending: INTERNAL MEDICINE | Admitting: INTERNAL MEDICINE

## 2022-08-11 ENCOUNTER — ANESTHESIA EVENT (OUTPATIENT)
Dept: GASTROENTEROLOGY | Facility: HOSPITAL | Age: 62
End: 2022-08-11

## 2022-08-11 ENCOUNTER — ANESTHESIA (OUTPATIENT)
Dept: GASTROENTEROLOGY | Facility: HOSPITAL | Age: 62
End: 2022-08-11

## 2022-08-11 VITALS
DIASTOLIC BLOOD PRESSURE: 67 MMHG | HEART RATE: 91 BPM | RESPIRATION RATE: 20 BRPM | OXYGEN SATURATION: 100 % | SYSTOLIC BLOOD PRESSURE: 127 MMHG | WEIGHT: 173.72 LBS | BODY MASS INDEX: 31.77 KG/M2 | TEMPERATURE: 97 F

## 2022-08-11 DIAGNOSIS — R10.84 GENERALIZED ABDOMINAL PAIN: ICD-10-CM

## 2022-08-11 DIAGNOSIS — K59.04 CHRONIC IDIOPATHIC CONSTIPATION: ICD-10-CM

## 2022-08-11 DIAGNOSIS — R12 HEARTBURN: ICD-10-CM

## 2022-08-11 PROCEDURE — 45380 COLONOSCOPY AND BIOPSY: CPT | Performed by: INTERNAL MEDICINE

## 2022-08-11 PROCEDURE — 43235 EGD DIAGNOSTIC BRUSH WASH: CPT | Performed by: INTERNAL MEDICINE

## 2022-08-11 PROCEDURE — 25010000002 PROPOFOL 10 MG/ML EMULSION: Performed by: NURSE ANESTHETIST, CERTIFIED REGISTERED

## 2022-08-11 PROCEDURE — 45385 COLONOSCOPY W/LESION REMOVAL: CPT | Performed by: INTERNAL MEDICINE

## 2022-08-11 PROCEDURE — 88305 TISSUE EXAM BY PATHOLOGIST: CPT | Performed by: INTERNAL MEDICINE

## 2022-08-11 RX ORDER — LIDOCAINE HYDROCHLORIDE 20 MG/ML
INJECTION, SOLUTION EPIDURAL; INFILTRATION; INTRACAUDAL; PERINEURAL AS NEEDED
Status: DISCONTINUED | OUTPATIENT
Start: 2022-08-11 | End: 2022-08-11 | Stop reason: SURG

## 2022-08-11 RX ORDER — PROPOFOL 10 MG/ML
VIAL (ML) INTRAVENOUS AS NEEDED
Status: DISCONTINUED | OUTPATIENT
Start: 2022-08-11 | End: 2022-08-11 | Stop reason: SURG

## 2022-08-11 RX ORDER — SODIUM CHLORIDE, SODIUM LACTATE, POTASSIUM CHLORIDE, CALCIUM CHLORIDE 600; 310; 30; 20 MG/100ML; MG/100ML; MG/100ML; MG/100ML
30 INJECTION, SOLUTION INTRAVENOUS CONTINUOUS
Status: DISCONTINUED | OUTPATIENT
Start: 2022-08-11 | End: 2022-08-11 | Stop reason: HOSPADM

## 2022-08-11 RX ORDER — ACETAMINOPHEN 325 MG/1
650 TABLET ORAL ONCE
Status: DISCONTINUED | OUTPATIENT
Start: 2022-08-11 | End: 2022-08-11

## 2022-08-11 RX ORDER — ACETAMINOPHEN 325 MG/1
650 TABLET ORAL ONCE
Status: COMPLETED | OUTPATIENT
Start: 2022-08-11 | End: 2022-08-11

## 2022-08-11 RX ADMIN — PROPOFOL 175 MCG/KG/MIN: 10 INJECTION, EMULSION INTRAVENOUS at 10:47

## 2022-08-11 RX ADMIN — LIDOCAINE HYDROCHLORIDE 60 MG: 20 INJECTION, SOLUTION EPIDURAL; INFILTRATION; INTRACAUDAL; PERINEURAL at 10:47

## 2022-08-11 RX ADMIN — SODIUM CHLORIDE, POTASSIUM CHLORIDE, SODIUM LACTATE AND CALCIUM CHLORIDE 30 ML/HR: 600; 310; 30; 20 INJECTION, SOLUTION INTRAVENOUS at 09:20

## 2022-08-11 RX ADMIN — PROPOFOL 100 MG: 10 INJECTION, EMULSION INTRAVENOUS at 10:47

## 2022-08-11 RX ADMIN — ACETAMINOPHEN 650 MG: 325 TABLET ORAL at 11:52

## 2022-08-11 NOTE — ADDENDUM NOTE
Addendum  created 08/11/22 1133 by Gustavo Clark MD    Order list changed, Pharmacy for encounter modified

## 2022-08-11 NOTE — H&P
Pre Procedure History & Physical    Chief Complaint:   Heartburn, generalized abd pain, constipation    Subjective     HPI:   60 yo F here for eval of heartburn, generalized abd pain, constipation.    Past Medical History:   Past Medical History:   Diagnosis Date   • Anemia, unspecified    • Chronic obstructive pulmonary disease, unspecified (HCC)    • Chronic sinusitis, unspecified    • Deviated nasal septum    • Dysthymic disorder    • Essential (primary) hypertension    • Fibromyalgia    • Generalized anxiety disorder    • GERD (gastroesophageal reflux disease)    • Hypo-osmolality and hyponatremia    • Mixed hyperlipidemia    • Obstructive sleep apnea (adult) (pediatric)    • Osteopenia    • Osteoporosis    • Other difficulties with micturition    • Other long term (current) drug therapy    • Vitamin D deficiency, unspecified        Past Surgical History:  Past Surgical History:   Procedure Laterality Date   • ABDOMINAL SURGERY     •  SECTION      X2   • CHOLECYSTECTOMY     • COLONOSCOPY     • DILATATION AND CURETTAGE      MULTIPLE   • LEEP     • WRIST FRACTURE SURGERY Right 2019       Family History:  Family History   Problem Relation Age of Onset   • Other Mother         Shy-Draegers/Neurological Disorder   • Lung cancer Father    • Hyperlipidemia Sister    • Diabetes type II Sister    • Thyroid disease Sister    • Rheum arthritis Sister    • Hyperlipidemia Brother    • Schizophrenia Brother    • Diabetes type I Son    • Coronary artery disease Other    • Malig Hyperthermia Neg Hx        Social History:   reports that she quit smoking about 26 years ago. Her smoking use included cigarettes. She has a 18.00 pack-year smoking history. She has never used smokeless tobacco. She reports current alcohol use. She reports that she does not use drugs.    Medications:   Medications Prior to Admission   Medication Sig Dispense Refill Last Dose   • Advair -21 MCG/ACT inhaler Inhale 2 puffs 2 (Two) Times  a Day.      • albuterol sulfate  (90 Base) MCG/ACT inhaler Inhale 1 puff Every 6 (Six) Hours As Needed.      • baclofen (LIORESAL) 10 MG tablet TAKE ONE TABLET BY MOUTH THREE TIMES A DAY AS NEEDED FOR MUSCLE SPASMS (Patient taking differently: Take 10 mg by mouth As Needed.) 90 tablet 0    • cetirizine (zyrTEC) 10 MG tablet Take 10 mg by mouth Every Night.      • DULoxetine (Cymbalta) 60 MG capsule Take 1 capsule by mouth Daily. (Patient taking differently: Take 60 mg by mouth Every Night.) 30 capsule 5    • fluticasone (FLONASE) 50 MCG/ACT nasal spray 1 spray into the nostril(s) as directed by provider As Needed.      • gabapentin (NEURONTIN) 300 MG capsule Take 1 capsule by mouth 3 (Three) Times a Day As Needed (fibromyalgia). 270 capsule 1    • linaclotide (Linzess) 145 MCG capsule capsule Take 1 capsule by mouth Every Morning Before Breakfast for 90 days. 90 capsule 1    • lisinopril-hydrochlorothiazide (PRINZIDE,ZESTORETIC) 10-12.5 MG per tablet Take 1 tablet by mouth Daily. 90 tablet 1    • montelukast (SINGULAIR) 10 MG tablet Take 10 mg by mouth Every Night.      • pantoprazole (PROTONIX) 20 MG EC tablet Take 1 tablet by mouth Daily. 30 tablet 3    • PEG-KCl-NaCl-NaSulf-Na Asc-C (Plenvu) 140 g reconstituted solution solution Take 140 g by mouth Take As Directed. 1 each 1    • simvastatin (ZOCOR) 20 MG tablet Take 1 tablet by mouth Every Night. 90 tablet 1    • Spacer/Aero-Holding Chambers (OptiChamber Karlee) misc       • VITAMIN D, CHOLECALCIFEROL, PO Take 5,000 Units by mouth Daily.      • Xiidra 5 % ophthalmic solution Administer 1 drop to both eyes As Needed.          Allergies:  Patient has no known allergies.    ROS:    Pertinent items are noted in HPI     Objective     Blood pressure 132/81, pulse 102, temperature 98.1 °F (36.7 °C), temperature source Temporal, resp. rate 18, weight 78.8 kg (173 lb 11.6 oz), SpO2 99 %.    Physical Exam   Constitutional: Pt is oriented to person, place, and time  and well-developed, well-nourished, and in no distress.   Mouth/Throat: Oropharynx is clear and moist.   Neck: Normal range of motion.   Cardiovascular: Normal rate, regular rhythm and normal heart sounds.    Pulmonary/Chest: Effort normal and breath sounds normal.   Abdominal: Soft. Nontender  Skin: Skin is warm and dry.   Psychiatric: Mood, memory, affect and judgment normal.     Assessment & Plan     Diagnosis:  Heartburn, generalized abd pain, constipation    Anticipated Surgical Procedure:  EGD/colonoscopy    The risks, benefits, and alternatives of this procedure have been discussed with the patient or the responsible party- the patient understands and agrees to proceed.

## 2022-08-11 NOTE — ANESTHESIA PREPROCEDURE EVALUATION
Anesthesia Evaluation     Patient summary reviewed and Nursing notes reviewed                Airway   Mallampati: I  TM distance: >3 FB  Neck ROM: full  No difficulty expected  Dental      Pulmonary - normal exam    breath sounds clear to auscultation  (+) COPD, sleep apnea,   Cardiovascular - normal exam    Rhythm: regular  Rate: normal    (+) hypertension, hyperlipidemia,       Neuro/Psych  (+) psychiatric history Anxiety,    GI/Hepatic/Renal/Endo    (+) obesity,  GERD,      Musculoskeletal     (+) neck pain,   Abdominal    Substance History - negative use     OB/GYN negative ob/gyn ROS         Other                        Anesthesia Plan    ASA 3     general     intravenous induction     Anesthetic plan, risks, benefits, and alternatives have been provided, discussed and informed consent has been obtained with: patient.        CODE STATUS:

## 2022-08-11 NOTE — ANESTHESIA POSTPROCEDURE EVALUATION
Patient: Katia Spears    Procedure Summary     Date: 08/11/22 Room / Location: [facility] ENDOSCOPY 3 / [facility] ENDOSCOPY    Anesthesia Start: 1043 Anesthesia Stop: 1117    Procedures:       ESOPHAGOGASTRODUODENOSCOPY WITH BIOPSIES (N/A )      COLONOSCOPY WITH BIOPSIES, POLYPECTOMIES (N/A ) Diagnosis:       Chronic idiopathic constipation      Generalized abdominal pain      Heartburn      (Chronic idiopathic constipation [K59.04])      (Generalized abdominal pain [R10.84])      (Heartburn [R12])    Surgeons: Esha Garcia MD Provider: Gustavo Clark MD    Anesthesia Type: general ASA Status: 3          Anesthesia Type: general    Vitals  Vitals Value Taken Time   /72 08/11/22 1120   Temp     Pulse 115 08/11/22 1121   Resp     SpO2 95 % 08/11/22 1121   Vitals shown include unvalidated device data.        Post Anesthesia Care and Evaluation    Patient location during evaluation: bedside  Patient participation: complete - patient participated  Level of consciousness: awake  Pain management: adequate    Airway patency: patent  Anesthetic complications: No anesthetic complications  PONV Status: none  Cardiovascular status: acceptable  Respiratory status: acceptable  Hydration status: acceptable    Comments: An Anesthesiologist personally participated in the most demanding procedures (including induction and emergence if applicable) in the anesthesia plan, monitored the course of anesthesia administration at frequent intervals and remained physically present and available for immediate diagnosis and treatment of emergencies.

## 2022-08-12 LAB
CYTO UR: NORMAL
LAB AP CASE REPORT: NORMAL
LAB AP CLINICAL INFORMATION: NORMAL
PATH REPORT.FINAL DX SPEC: NORMAL
PATH REPORT.GROSS SPEC: NORMAL

## 2022-08-22 ENCOUNTER — TELEPHONE (OUTPATIENT)
Dept: GASTROENTEROLOGY | Facility: CLINIC | Age: 62
End: 2022-08-22

## 2022-08-24 DIAGNOSIS — M79.7 FIBROMYALGIA: ICD-10-CM

## 2022-08-25 RX ORDER — BACLOFEN 10 MG/1
TABLET ORAL
Qty: 90 TABLET | Refills: 0 | Status: SHIPPED | OUTPATIENT
Start: 2022-08-25 | End: 2022-10-11 | Stop reason: SDUPTHER

## 2022-08-26 ENCOUNTER — APPOINTMENT (OUTPATIENT)
Dept: WOMENS IMAGING | Facility: HOSPITAL | Age: 62
End: 2022-08-26

## 2022-08-26 PROCEDURE — 77067 SCR MAMMO BI INCL CAD: CPT | Performed by: RADIOLOGY

## 2022-08-26 PROCEDURE — 77063 BREAST TOMOSYNTHESIS BI: CPT | Performed by: RADIOLOGY

## 2022-09-09 ENCOUNTER — APPOINTMENT (OUTPATIENT)
Dept: WOMENS IMAGING | Facility: HOSPITAL | Age: 62
End: 2022-09-09

## 2022-09-09 PROCEDURE — 76642 ULTRASOUND BREAST LIMITED: CPT | Performed by: RADIOLOGY

## 2022-09-09 PROCEDURE — 77061 BREAST TOMOSYNTHESIS UNI: CPT | Performed by: RADIOLOGY

## 2022-09-09 PROCEDURE — 77065 DX MAMMO INCL CAD UNI: CPT | Performed by: RADIOLOGY

## 2022-09-09 PROCEDURE — G0279 TOMOSYNTHESIS, MAMMO: HCPCS | Performed by: RADIOLOGY

## 2022-09-12 RX ORDER — PANTOPRAZOLE SODIUM 20 MG/1
20 TABLET, DELAYED RELEASE ORAL DAILY
Qty: 30 TABLET | Refills: 3 | Status: SHIPPED | OUTPATIENT
Start: 2022-09-12 | End: 2023-03-31 | Stop reason: SDUPTHER

## 2022-09-19 DIAGNOSIS — F41.9 ANXIETY: ICD-10-CM

## 2022-09-19 DIAGNOSIS — M79.7 FIBROMYALGIA: ICD-10-CM

## 2022-09-19 RX ORDER — DULOXETIN HYDROCHLORIDE 60 MG/1
60 CAPSULE, DELAYED RELEASE ORAL NIGHTLY
Qty: 30 CAPSULE | Refills: 0 | Status: SHIPPED | OUTPATIENT
Start: 2022-09-19 | End: 2022-10-21

## 2022-10-11 ENCOUNTER — OFFICE VISIT (OUTPATIENT)
Dept: FAMILY MEDICINE CLINIC | Age: 62
End: 2022-10-11

## 2022-10-11 VITALS
HEART RATE: 103 BPM | WEIGHT: 183 LBS | SYSTOLIC BLOOD PRESSURE: 121 MMHG | HEIGHT: 62 IN | BODY MASS INDEX: 33.68 KG/M2 | DIASTOLIC BLOOD PRESSURE: 64 MMHG | OXYGEN SATURATION: 100 %

## 2022-10-11 DIAGNOSIS — F41.9 ANXIETY: Primary | ICD-10-CM

## 2022-10-11 DIAGNOSIS — E55.9 VITAMIN D DEFICIENCY: ICD-10-CM

## 2022-10-11 DIAGNOSIS — Z91.09 ENVIRONMENTAL ALLERGIES: ICD-10-CM

## 2022-10-11 DIAGNOSIS — Z79.899 ENCOUNTER FOR LONG-TERM (CURRENT) USE OF OTHER MEDICATIONS: ICD-10-CM

## 2022-10-11 DIAGNOSIS — I10 PRIMARY HYPERTENSION: ICD-10-CM

## 2022-10-11 DIAGNOSIS — M79.7 FIBROMYALGIA: ICD-10-CM

## 2022-10-11 DIAGNOSIS — E78.2 MIXED HYPERLIPIDEMIA: ICD-10-CM

## 2022-10-11 DIAGNOSIS — G47.33 OSA (OBSTRUCTIVE SLEEP APNEA): ICD-10-CM

## 2022-10-11 DIAGNOSIS — M54.2 NECK PAIN: ICD-10-CM

## 2022-10-11 PROBLEM — Z12.11 COLON CANCER SCREENING: Status: RESOLVED | Noted: 2022-03-25 | Resolved: 2022-10-11

## 2022-10-11 PROBLEM — R10.84 GENERALIZED ABDOMINAL PAIN: Status: RESOLVED | Noted: 2021-06-15 | Resolved: 2022-10-11

## 2022-10-11 PROCEDURE — 99214 OFFICE O/P EST MOD 30 MIN: CPT | Performed by: NURSE PRACTITIONER

## 2022-10-11 PROCEDURE — 80305 DRUG TEST PRSMV DIR OPT OBS: CPT | Performed by: NURSE PRACTITIONER

## 2022-10-11 RX ORDER — BACLOFEN 10 MG/1
10 TABLET ORAL 3 TIMES DAILY
Qty: 270 TABLET | Refills: 1 | Status: SHIPPED | OUTPATIENT
Start: 2022-10-11

## 2022-10-11 RX ORDER — ESTRADIOL 0.1 MG/G
CREAM VAGINAL
COMMUNITY
Start: 2022-08-26

## 2022-10-11 RX ORDER — MONTELUKAST SODIUM 10 MG/1
10 TABLET ORAL NIGHTLY
Qty: 90 TABLET | Refills: 1 | Status: SHIPPED | OUTPATIENT
Start: 2022-10-11

## 2022-10-11 RX ORDER — LIFITEGRAST 50 MG/ML
SOLUTION/ DROPS OPHTHALMIC
COMMUNITY
Start: 2022-08-24

## 2022-10-11 RX ORDER — SIMVASTATIN 20 MG
20 TABLET ORAL NIGHTLY
Qty: 90 TABLET | Refills: 1 | Status: SHIPPED | OUTPATIENT
Start: 2022-10-11

## 2022-10-11 RX ORDER — AZELASTINE 1 MG/ML
SPRAY, METERED NASAL
COMMUNITY
Start: 2022-08-02

## 2022-10-11 RX ORDER — LINACLOTIDE 145 UG/1
CAPSULE, GELATIN COATED ORAL
COMMUNITY
Start: 2022-09-06 | End: 2023-01-03

## 2022-10-11 RX ORDER — LISINOPRIL AND HYDROCHLOROTHIAZIDE 12.5; 1 MG/1; MG/1
1 TABLET ORAL DAILY
Qty: 90 TABLET | Refills: 1 | Status: SHIPPED | OUTPATIENT
Start: 2022-10-11

## 2022-10-11 RX ORDER — GABAPENTIN 300 MG/1
300 CAPSULE ORAL 3 TIMES DAILY PRN
Qty: 270 CAPSULE | Refills: 1 | Status: SHIPPED | OUTPATIENT
Start: 2022-10-11

## 2022-10-11 NOTE — ASSESSMENT & PLAN NOTE
Further treatment pending imaging results.  I would be more inclined to think it is due to degenerative change over time with the spine rather than scoliosis which was diagnosed many years ago.  Discussed conservative measures for managing neck pain.  Baclofen may cause drowsiness.  Follow-up if not improving.  Consider physical therapy.

## 2022-10-11 NOTE — ASSESSMENT & PLAN NOTE
Options are to try venlafaxine once again as a drug holiday may be helpful.  Another option is changed to Pristiq.  Patient states she will consider and let me know how she wants to proceed after further evaluating over the next few weeks.

## 2022-10-11 NOTE — PROGRESS NOTES
"Chief Complaint  Anxiety (6 month ), Hypertension, Hyperlipidemia, Vitamin D Deficiency, and COPD    Subjective  Patient is a 62-year-old female who is here today to discuss anxiety and depression.  At last visit in April, venlafaxine did not feel to be effective any longer.  Venlafaxine was changed to duloxetine in hopes it would help with anxiety and depression in addition to fibromyalgia.  Patient states it did help initially but feels like she may be having some increase in depressive symptoms to a mild degree.    History of vitamin D deficiency and takes vitamin D supplement on a daily basis.    For hyperlipidemia she takes simvastatin 20 mg at bedtime.  Denies side effects and requests refills.    Acid reflux symptoms are stable with use of pantoprazole 20 mg daily.  Denies side effects and requests refills.    Reports mild fatigue.  Previously diagnosed with sleep apnea after spending the night in the sleep center.  Had difficulty finding a mask that was comfortable for where.  She would like to consider being retested in order to get a new CPAP machine or mask.  She prefers not to spend the night in the sleep center.  Home sleep test is preferred.    Some increasing symptoms of neck pain that radiates to the back of her head.  Denies any injury.  Is concerned it could be due to her history of scoliosis.    Allergy symptoms are stable with use of Singulair 10 mg nightly.  Denies side effects and requests refills.    Blood pressure is under good control with use of lisinopril 10 mg with hydrochlorothiazide 12.5 mg daily.  Denies side effects and requests refills.        Katia Spears presents to [facility]          Objective   Vital Signs:   Vitals:    10/11/22 1256   BP: 121/64   BP Location: Left arm   Patient Position: Sitting   Cuff Size: Large Adult   Pulse: 103   SpO2: 100%   Weight: 83 kg (183 lb)   Height: 157.5 cm (62\")      Body mass index is 33.47 kg/m².  Physical " Exam  Vitals reviewed.   Constitutional:       General: She is not in acute distress.     Appearance: Normal appearance. She is well-developed.   Cardiovascular:      Rate and Rhythm: Normal rate and regular rhythm.      Pulses:           Posterior tibial pulses are 2+ on the right side and 2+ on the left side.      Heart sounds: Normal heart sounds.   Pulmonary:      Effort: Pulmonary effort is normal.      Breath sounds: Normal breath sounds.   Musculoskeletal:      Right lower leg: No edema.      Left lower leg: No edema.   Skin:     General: Skin is warm and dry.   Neurological:      General: No focal deficit present.      Mental Status: She is alert.   Psychiatric:         Attention and Perception: Attention normal.         Mood and Affect: Mood and affect normal.         Behavior: Behavior normal.          Result Review :     CMP    CMP 4/1/22   Glucose 86   BUN 10   Creatinine 0.92   Sodium 136   Potassium 4.2   Chloride 98   Calcium 9.7   Albumin 4.50   Total Bilirubin 0.3   Alkaline Phosphatase 97   AST (SGOT) 21   ALT (SGPT) 29           CBC    CBC 4/1/22   WBC 5.05   RBC 4.34   Hemoglobin 12.7   Hematocrit 39.2   MCV 90.3   MCH 29.3   MCHC 32.4   RDW 12.8   Platelets 414           CBC w/diff    CBC w/Diff 4/1/22   WBC 5.05   RBC 4.34   Hemoglobin 12.7   Hematocrit 39.2   MCV 90.3   MCH 29.3   MCHC 32.4   RDW 12.8   Platelets 414   Neutrophil Rel % 56.6   Immature Granulocyte Rel % 0.2   Lymphocyte Rel % 34.9   Monocyte Rel % 8.1   Eosinophil Rel % 0.2 (A)   Basophil Rel % 0.0   (A) Abnormal value            Lipid Panel    Lipid Panel 4/1/22   Total Cholesterol 205 (A)   Triglycerides 87   HDL Cholesterol 75 (A)   VLDL Cholesterol 15   LDL Cholesterol  115 (A)   LDL/HDL Ratio 1.50   (A) Abnormal value            TSH    TSH 4/1/22   TSH 2.670                    Assessment and Plan    Diagnoses and all orders for this visit:    1. Anxiety (Primary)  Assessment & Plan:  Options are to try venlafaxine once  again as a drug holiday may be helpful.  Another option is changed to Pristiq.  Patient states she will consider and let me know how she wants to proceed after further evaluating over the next few weeks.      2. Encounter for long-term (current) use of other medications  -     POC Urine Drug Screen [facility]  -     gabapentin (NEURONTIN) 300 MG capsule; Take 1 capsule by mouth 3 (Three) Times a Day As Needed (fibromyalgia).  Dispense: 270 capsule; Refill: 1    3. Fibromyalgia  -     POC Urine Drug Screen [facility]  -     baclofen (LIORESAL) 10 MG tablet; Take 1 tablet by mouth 3 (Three) Times a Day.  Dispense: 270 tablet; Refill: 1  -     gabapentin (NEURONTIN) 300 MG capsule; Take 1 capsule by mouth 3 (Three) Times a Day As Needed (fibromyalgia).  Dispense: 270 capsule; Refill: 1    4. Primary hypertension  -     lisinopril-hydrochlorothiazide (PRINZIDE,ZESTORETIC) 10-12.5 MG per tablet; Take 1 tablet by mouth Daily.  Dispense: 90 tablet; Refill: 1  -     Comprehensive metabolic panel; Future    5. JE (obstructive sleep apnea)  -     Ambulatory Referral to Sleep Medicine    6. Mixed hyperlipidemia  -     simvastatin (ZOCOR) 20 MG tablet; Take 1 tablet by mouth Every Night.  Dispense: 90 tablet; Refill: 1  -     Lipid panel; Future    7. Vitamin D deficiency  Assessment & Plan:  Continue supplement.  Last vitamin D level was normal      8. Neck pain  Assessment & Plan:  Further treatment pending imaging results.  I would be more inclined to think it is due to degenerative change over time with the spine rather than scoliosis which was diagnosed many years ago.  Discussed conservative measures for managing neck pain.  Baclofen may cause drowsiness.  Follow-up if not improving.  Consider physical therapy.    Orders:  -     XR Spine Cervical 2 or 3 View; Future    9. Environmental allergies  -     montelukast (SINGULAIR) 10 MG tablet; Take 1 tablet by mouth Every Night.  Dispense: 90 tablet; Refill:  1      Follow Up    No follow-ups on file.  Patient was given instructions and counseling regarding her condition or for health maintenance advice. Please see specific information pulled into the AVS if appropriate.   Answers for HPI/ROS submitted by the patient on 10/4/2022  What is the primary reason for your visit?: Physical

## 2022-10-20 DIAGNOSIS — F41.9 ANXIETY: ICD-10-CM

## 2022-10-20 DIAGNOSIS — M79.7 FIBROMYALGIA: ICD-10-CM

## 2022-10-21 ENCOUNTER — HOSPITAL ENCOUNTER (OUTPATIENT)
Dept: GENERAL RADIOLOGY | Facility: HOSPITAL | Age: 62
Discharge: HOME | End: 2022-10-21

## 2022-10-21 ENCOUNTER — LAB (OUTPATIENT)
Dept: LAB | Facility: HOSPITAL | Age: 62
End: 2022-10-21

## 2022-10-21 DIAGNOSIS — I10 PRIMARY HYPERTENSION: ICD-10-CM

## 2022-10-21 DIAGNOSIS — E78.2 MIXED HYPERLIPIDEMIA: ICD-10-CM

## 2022-10-21 DIAGNOSIS — M54.2 NECK PAIN: ICD-10-CM

## 2022-10-21 PROCEDURE — 80053 COMPREHEN METABOLIC PANEL: CPT

## 2022-10-21 PROCEDURE — 72040 X-RAY EXAM NECK SPINE 2-3 VW: CPT

## 2022-10-21 PROCEDURE — 36415 COLL VENOUS BLD VENIPUNCTURE: CPT

## 2022-10-21 PROCEDURE — 80061 LIPID PANEL: CPT

## 2022-10-21 RX ORDER — DULOXETIN HYDROCHLORIDE 60 MG/1
CAPSULE, DELAYED RELEASE ORAL
Qty: 90 CAPSULE | Refills: 1 | Status: SHIPPED | OUTPATIENT
Start: 2022-10-21

## 2022-10-22 LAB
ALBUMIN SERPL-MCNC: 4 G/DL (ref 3.5–5.2)
ALBUMIN/GLOB SERPL: 1.6 G/DL
ALP SERPL-CCNC: 93 U/L (ref 39–117)
ALT SERPL W P-5'-P-CCNC: 22 U/L (ref 1–33)
ANION GAP SERPL CALCULATED.3IONS-SCNC: 8.3 MMOL/L (ref 5–15)
AST SERPL-CCNC: 19 U/L (ref 1–32)
BILIRUB SERPL-MCNC: 0.2 MG/DL (ref 0–1.2)
BUN SERPL-MCNC: 11 MG/DL (ref 8–23)
BUN/CREAT SERPL: 10.5 (ref 7–25)
CALCIUM SPEC-SCNC: 10.1 MG/DL (ref 8.6–10.5)
CHLORIDE SERPL-SCNC: 100 MMOL/L (ref 98–107)
CHOLEST SERPL-MCNC: 187 MG/DL (ref 0–200)
CO2 SERPL-SCNC: 28.7 MMOL/L (ref 22–29)
CREAT SERPL-MCNC: 1.05 MG/DL (ref 0.57–1)
EGFRCR SERPLBLD CKD-EPI 2021: 59.5 ML/MIN/1.73
GLOBULIN UR ELPH-MCNC: 2.5 GM/DL
GLUCOSE SERPL-MCNC: 87 MG/DL (ref 65–99)
HDLC SERPL-MCNC: 78 MG/DL (ref 40–60)
LDLC SERPL CALC-MCNC: 96 MG/DL (ref 0–100)
LDLC/HDLC SERPL: 1.22 {RATIO}
POTASSIUM SERPL-SCNC: 4.4 MMOL/L (ref 3.5–5.2)
PROT SERPL-MCNC: 6.5 G/DL (ref 6–8.5)
SODIUM SERPL-SCNC: 137 MMOL/L (ref 136–145)
TRIGL SERPL-MCNC: 68 MG/DL (ref 0–150)
VLDLC SERPL-MCNC: 13 MG/DL (ref 5–40)

## 2022-10-26 NOTE — PROGRESS NOTES
Anterolisthesis is where 1 vertebrae slips forward slightly more than the other beneath it.  2 mm anterolisthesis is slight.  There is some degenerative and arthritic change.  I would recommend physical therapy for evaluation of neck pain with later consideration of MRI without contrast if symptoms do not resolve.  Please let me know if I can refer you to physical therapy for evaluation of neck pain.

## 2022-10-26 NOTE — PROGRESS NOTES
Blood sugar and liver function are normal.  Creatinine, BUN, and EGFR tell us about kidney function.  This looks as if you may need to increase fluid intake or may not of had adequate fluid intake at time of labs being collected.  This finding is very slight.  However can increase fluid intake and recheck kidney function in 1 month if she would like.  Cholesterol levels are improved.  HDL cholesterol your good cholesterol reading.

## 2022-11-01 ENCOUNTER — TELEPHONE (OUTPATIENT)
Dept: GASTROENTEROLOGY | Facility: CLINIC | Age: 62
End: 2022-11-01

## 2022-11-01 NOTE — TELEPHONE ENCOUNTER
Spoke with patient regarding needing to reschedule their follow up appointment with Bebeto Carlin. Patient verbalized understanding and patient opted to canceling her appointment for now.

## 2022-12-14 ENCOUNTER — TELEPHONE (OUTPATIENT)
Dept: FAMILY MEDICINE CLINIC | Age: 62
End: 2022-12-14

## 2022-12-14 ENCOUNTER — E-VISIT (OUTPATIENT)
Dept: FAMILY MEDICINE CLINIC | Facility: TELEHEALTH | Age: 62
End: 2022-12-14

## 2022-12-14 PROCEDURE — BRIGHTMDVISIT: Performed by: NURSE PRACTITIONER

## 2022-12-14 RX ORDER — PREDNISONE 10 MG/1
TABLET ORAL DAILY
Qty: 21 EACH | Refills: 0 | Status: SHIPPED | OUTPATIENT
Start: 2022-12-14 | End: 2022-12-20

## 2022-12-14 NOTE — EXTERNAL PATIENT INSTRUCTIONS
Note   Kenneth Hernandez, i will call you in a steroid pack in hopes it will improve your breathing. I suspect you have the flu. If your symptoms worsen i would get checked out at the Urgent treatment center or the ED. Continue to monitor your oxygen saturation and if it deeps below 90% and stays there, go to the ED. Thank you, Telma Carlton MIKE   Diagnosis   Viral upper respiratory infection (URI)   My name is Telma Carlton, and I'm a healthcare provider at [facility]. I've reviewed your interview and based on your responses, I see that you have a viral upper respiratory infection. However, the exact type of virus causing your illness isn't clear.   The start of cold and flu season, coupled with the ongoing COVID-19 pandemic, makes it hard to tell the difference between the common cold, the flu, or a COVID-19 infection. These illnesses share many of the same symptoms, including fever, fatigue, muscle and/or body aches, sore throat, runny/stuffy nose, and cough.   Most people with any of these illnesses have mild to moderate symptoms and can rest at home until they get better.   I haven't prescribed any antibiotics. Antibiotics fight bacteria, not viruses. They don't help when you have a viral infection like the common cold, the flu, or a COVID-19 infection. Antibiotics could even make you feel worse as they can cause or worsen nausea, diarrhea, and stomach pain.    Stay home   While you're recovering, STAY HOME. Do not leave your home except to get medical care.   While at home, avoid close contact with others.   If possible, stay in a room away from other people in your home, and use a separate bathroom.   Wear a face mask when you can't avoid contact with other people.   If you can't wear a face mask because of breathing difficulty, your caregiver should wear a face mask.   Wearing a face mask does NOT mean you can leave your home. You must continue to stay home until you have recovered.   You can return to your  "normal activities when ALL of the following are true:    You've been fever-free for at least 24 hours (1 full day) without using fever-reducing medications such as Tylenol    Your symptoms have improved    It's been at least 5 full days since your symptoms first started   Prevention    Cover your mouth and nose with a tissue when you cough or sneeze. Throw used tissues in a lined trash can right away, and wash your hands immediately after.    Avoid sharing personal items like dishes, utensils, towels, or bedding. Wash items thoroughly with soap and water after use.    Wash your hands often with soap and water for at least 20 seconds. If soap and water are not available, clean your hands with a hand  that contains at least 60% alcohol. Cover all surfaces of your hands and rub them together until they feel dry.    Avoid touching your face, especially their eyes, nose, and mouth.    Clean \"high-touch\" surfaces daily. \"High-touch\" surfaces include counters, tabletops, doorknobs, bathroom fixtures, toilets, phones, keyboards, tablets, and bedside tables. You can use soap, detergents, 60%-80% ethanol or isopropyl alcohol,  such as Windex, or bleach. All of these  are effective at killing the virus that causes COVID-19.    Limit contact with pets and other animals while sick. If you must care for your pet, wash your hands before and after you interact with them and wear a face mask.   What to expect   Follow the advice in the treatment section below and you should feel better within 7 to 14 days. You may continue to feel tired and have a cough for several weeks.   Medications   Your pharmacy   [facility] [id number] [facility] [address] [facility] [address] [phone]     Prescription   Benzonatate (200mg): Take 1 capsule by mouth 3 times a day as needed for cough. Do not chew or cut these capsules.   Mucus Relief ER oral tablet, extended release (600mg): Take 1 tablet by mouth " every 12 hours as needed for cough and congestion.   Non-prescription   Theraflu Nighttime Severe Cold-Cough (10mg/25mg/650mg): Drink 1 packet dissolved in 8 ounces hot water every 4 hours as needed for cold symptoms. Consume entire drink within 10 to 15 minutes. Do not exceed 5 packets in any 24-hour period. May cause drowsiness. Do not drive or operate heavy machinery after taking this medication. Do not combine with other products that contain acetaminophen.   The medications recommended here can help ease your symptoms while your immune system fights the virus.   About your diagnosis   The common cold, the flu (influenza), and COVID-19 are respiratory illnesses that are caused by viruses. While the specific type of virus that causes these infections is different, the symptoms can often be similar. Fortunately, most people who get these infections have mild symptoms and can rest at home until they get better. For elderly people and those with chronic medical problems, these infections can be serious or life-threatening.   When to seek care   Call us at [phone]   with any sudden or unexpected symptoms.   Call your healthcare provider immediately if you have any of the following:    Fever over 103F    Fever that doesn't come down when you take Tylenol or ibuprofen    Fever that returns after being gone for more than 24 hours    Fever for more than 4 days    Worsening shortness of breath or difficulty breathing   Go to your nearest ER or call 911 if you have any of the following:    Shortness of breath that makes it difficult to do simple things like get dressed, bathe, or comb your hair    Persistent chest pain or chest tightness    New confusion or difficulty staying alert    Bluish color to the lips or face   Other treatment    Rest and drink plenty of sugar-free fluids.    Use a clean humidifier or a cool-mist vaporizer in your room at night. Breathing humid air may help with nasal congestion.    Gargle  with salt water several times a day to help your throat feel better. Cough drops and throat lozenges may provide extra relief. A teaspoon of honey stirred into warm water or weak tea can help soothe a sore throat and cough.    Try using a Neti Pot to flush out your stuffy nose and sinuses. Neti Pots are available at any drugstore without a prescription.    Avoid smoke and air pollution. Smoke can make infections worse.    Coronavirus (COVID-19) information   Common symptoms of COVID-19 include fever, cough, shortness of breath, fatigue, muscle or body aches, headaches, new loss of sense of taste or smell, sore throat, stuffy or runny nose, nausea or vomiting, and diarrhea. Most people who get COVID-19 have mild symptoms and can rest at home until they get better. Elderly people and those with chronic medical problems may be at risk for more serious complications.   FAQs about the COVID-19 vaccine   There are four authorized COVID-19 vaccines: [employer]'s Geoff Vaccine (J&J/Geoff), Moderna, Novavax, and Pfizer-BioNTech (Pfizer). The J&J/Geoff and Novavax vaccines are approved for use in people aged 18 and older. The Moderna and Pfizer vaccines are approved for those aged 6 months and older. All four are available at no cost. Even if you don't have health insurance, you can still get the COVID-19 vaccine for free.   Which vaccine is the best? Which vaccine should I get?   All four vaccines are highly effective. Even if you get COVID-19 after being vaccinated, all of the vaccines help prevent severe disease, hospitalization, and complications.   Most people should get whichever vaccine is first available to them. However, women younger than 50 years old should consider the rare risk of blood clots with low platelets after vaccination with the J&J/Geoff vaccine. This risk hasn't been seen with the other three vaccines.   Are the vaccines safe?   Yes. Hundreds of millions of people in the [place] have already  safely received COVID-19 vaccines under the most intense safety monitoring in the history of the [place].   Do I need the vaccine if I've already had COVID?   Yes. Vaccination helps protect you even if you've already had COVID.   If you had COVID-19 and had symptoms, wait to get vaccinated until you've recovered and completed your isolation period.   If you tested positive for COVID-19 but did not have symptoms, you can get vaccinated after 5 full days have passed since you had a positive test, as long as you don't develop symptoms.   How many doses of the vaccine do I need?   Visit www.cdc.gov/coronavirus/2019-ncov/vaccines/stay-up-to-date.html   to find out how to stay up to date with your COVID-19 vaccines.   I'm immunocompromised. How many doses of the vaccine do I need?   For information on how immunocompromised people can stay up to date with their COVID-19 vaccines, visit www.cdc.gov/coronavirus/2019-ncov/vaccines/recommendations/immuno.html  .   What are the common side effects of the vaccine?   A sore arm, tiredness, headache, and muscle pain may occur within two days of getting the vaccine and last a day or two. For the Moderna or Pfizer vaccines, side effects are more common after the second dose. People over the age of 55 are less likely to have side effects than younger people.   After I'm up to date on vaccines, can I still get or spread COVID?   Yes, you can still get COVID, but your disease should be milder. And your risk of serious illness, hospitalization, and complications will be much lower, especially if you're up to date. Unfortunately, you can still spread COVID if you've been vaccinated. That's why it's important to follow isolation guidelines if you get sick or test positive.   After I'm up to date on vaccines, can I go back to normal?   You should still wear a mask indoors in public if:    It's required by laws, rules, regulations, or local guidance.    You have a weakened immune system.    Your  age puts you at increased risk of severe disease.    You have a medical condition that puts you at increased risk of severe disease.    Someone in your household has a weakened immune system, is at increased risk for severe disease, or is unvaccinated.    You're in an area of high transmission.   Where can I get a COVID-19 vaccine?   Visit [facility]'s website for more information. To find a COVID-19 vaccination site near you, visit www.[place].gov/  , call [phone]  , or text your zip code to [phone] ([employer]). Message and data rates may apply.   What about travel?   Travel increases your risk of exposure to COVID-19. For more information, see www.cdc.gov/coronavirus/2019-ncov/travelers/index.html  .   I've had close contact with someone who has COVID. Do I need to quarantine, and if so, for how long?   For the most current answer, including a calculator to determine whether you need to stay home and for how long, visit www.cdc.gov/coronavirus/2019-ncov/your-health/quarantine-isolation.html  .   I've tested positive for COVID. How long do I need to isolate?   For the latest recommendations, including a calculator to determine how long you need to stay home, visit www.cdc.gov/coronavirus/2019-ncov/your-health/quarantine-isolation.html  .   What if I develop symptoms that might be from COVID?   For the latest recommendations on what to do if you're sick, including when to seek emergency care, visit www.cdc.gov/coronavirus/2019-ncov/if-you-are-sick/steps-when-sick.html  .    Flu vaccine information   Who should get a flu vaccine?   Everyone 6 months of age and older should get a yearly flu vaccine.   When should I get vaccinated?   You should get a flu vaccine by the end of October. Once you're vaccinated, it takes about two weeks for antibodies to develop and protect you against the flu. That's why it's important to get vaccinated as soon as possible.   After October, is it too late to get vaccinated?   No.  You should still get vaccinated. As long as the flu viruses are still in your community, flu vaccines will remain available, even into January of next year or later.   Why do I need a flu vaccine EVERY year?   Flu viruses are constantly changing, so flu vaccines are usually updated from one season to the next. Your protection from the flu vaccine also lessens over time.   Is the flu vaccine safe?   Yes. Over the last 50 years, hundreds of millions of Americans have safely received the flu vaccines.   What are the side effects of flu vaccines?   You CANNOT get the flu from a flu vaccine. Common side effects of the flu shot include soreness, redness and/or swelling where the shot was given, low grade fever, and aches. Common side effects of the nasal spray flu vaccine for adults include runny nose, headaches, sore throat, and cough. For children, side effects include wheezing, vomiting, muscle aches, and fever.   Does the flu vaccine increase your risk of getting COVID-19?   No. There is no evidence that getting a flu vaccine increases your risk of getting COVID-19.   Is it safe to get the flu vaccine along with a COVID-19 vaccine?   Yes. It's safe to get the flu vaccine with a COVID-19 vaccine or booster.   Contact your healthcare provider TODAY for details on when and where to get your flu vaccine.   Your provider   Your diagnosis was provided by Telma Carlton, a member of your trusted care team at [facility].   If you have any questions, call us at [phone]  .

## 2022-12-14 NOTE — E-VISIT TREATED
Chief Complaint: Coronavirus (COVID-19), cold, sinus pain, allergy, or flu   Patient introduction   Patient is 62-year-old female with cough, shortness of breath, congestion, nasal discharge, new loss of smell or taste, voice hoarseness, chills, fatigue, and nausea or vomiting that started 6 to 9 days ago. Regarding date of symptom onset, patient writes: 12/08/22.   COVID-19 exposure, testing history, and vaccination status:    No known exposure to a person with a confirmed or suspected case of COVID-19.    Recent travel outside of their local community.    No history of COVID-19 testing since symptom onset.    Has not received a COVID-19 vaccination.   Risk factors for severe disease from COVID-19 infection:    BMI >= 25.    Does not smoke tobacco; smoked previously.    Asthma.    Rheumatoid arthritis.   Warning. The following may warrant further investigation:    Severe shortness of breath (prohibits daily activities, can't speak a full sentence, or can't inhale for 3 to 4 seconds)    Dizziness that makes it hard to stand, walk, or do daily activities.   Patient-submitted comments: Couldn't do telehealth because I've mostly lost my voice. The cough interrupts if I try the breathing tests in the questions. The best description on breathing is I can't walk more than a minute without being out of breath and getting lightheaded. I have an oximeter that has consistently measured over 90. We suspect it came from our grandchild and suspect the flu, but nobody has been tested other than my  and I (he has it but less sick) did the at home COVID test, both negative..   Patient did not request an excuse note.   General presentation   Is uncertain about improvement of symptoms. Symptoms came on suddenly.   Fever:    No fever.   Sinus and nasal symptoms:    Nasal discharge.    Nasal drainage is thin.    Postnasal drip.    Congestion without associated pain or pressure.    No itchy nose or sneezing.   Throat  symptoms:    Voice is severely hoarse. Patient does not believe hoarseness is due to voice strain.    No sore throat.   Head and body aches:    Chills.    Fatigue.    No headache.    No sweats.    No myalgia.   Cough:    Cough is not noticeably worse depending on time of day    Cough is mildly productive of sputum.    Uncertain of color of sputum.   Wheezing and shortness of breath:    Has asthma diagnosis.    Wheezing.    Current cold symptoms aggravate their asthma.    Moderate shortness of breath.   Despite shortness of breath, patient can take a full, deep breath (inhaling for 3 to 4 seconds).    Because of shortness of breath, patient is unable to speak a full sentence.    Has run out of usual medications/inhalers for asthma.    No COPD diagnosis.   Chest pain:    No chest pain.   Ear symptoms:    Current symptoms include fullness in the ear(s).   Dizziness:    Dizziness that interferes with daily activities.   Allergies:    Patient has known seasonal allergies and known dust allergies.    Patient does not think symptoms are allergy-related.   Flu exposure:    No recent known exposure to a person with a confirmed flu diagnosis.    Has not had a flu vaccine this season.   Patient is taking over-the-counter medications for current symptoms, including acetaminophen, guaifenesin, and ibuprofen.   Review of red flags/alarm symptoms:    No severe shortness of breath.    No inability to take a full breath lasting 3 to 4 seconds.    No changes in alertness or awareness.    No decreased urination.   Risk factors for antibiotic resistance:    Close contact with a child in .   Pregnancy/menstrual status/breastfeeding:    Patient is postmenopausal.   Self-exam:    Height: 157 centimeters    Weight: 72.5 kilograms    No difficulty moving their chin toward their chest.    Neck lymph nodes feel normal.    Has not taken antibiotics for similar symptoms within the past month.   Current medications   Currently taking  VITAMIN D (CHOLECALCIFEROL) PO.   Medication allergies   None.   Medication contraindication review   No history of anaphylactic reaction to beta-lactam antibiotics; aspirin triad; blood dyscrasia; bone marrow depression; catecholamine-releasing paraganglioma; coronary artery disease; coagulation disorder; congenital long QT syndrome; depression; electrolyte abnormalities; fungal infection; GI bleeding; GI obstruction; G6PD deficiency; heart arrhythmia; hypertension; mononucleosis; myasthenia; recent myocardial infarction; NSAID-induced asthma/urticaria; Parkinson's disease; pheochromocytoma; porphyria; Reye syndrome; seizure disorder; ulcerative colitis; and urinary retention.   No history of metoclopramide-associated dystonic reaction and tardive dyskinesia.   No known history of amoxicillin-clavulanate-associated cholestatic jaundice or hepatic impairment.   No known history of azithromycin-associated cholestatic jaundice or hepatic impairment.   Past medical history   Immune conditions: rheumatoid arthritis. Patient is not currently taking medications for their condition(s). No history of cancer.   Social history   High-risk household contacts: Patient's household includes one or more members of a group with risk factors for influenza complications, including a person 65 years or older. Patient's household includes one or more persons with asthma and diabetes.   Does not smoke tobacco; smoked previously.   Assessment   Viral URI, cannot rule out influenza or COVID-19. Ruled out: Traumatic laryngitis.   Plan   Medications:    benzonatate 200 mg capsule RX 200mg 1 cap PO tid PRN 7d for cough. Do not chew or cut these capsules. Amount is 21 cap.    Mucus Relief  mg tablet, extended release RX 600mg 1 tab PO q12h PRN 7d for cough and congestion. Amount is 14 tab.    Theraflu Nighttime Severe Cold-Cough 25 mg-10 mg-650 mg powder packet OTC Phenylephrine hydrochloride 10mg/Diphenhydramine hydrochloride  "25mg/Acetaminophen 650mg 1 packet PO q4h PRN 7d for cold symptoms. Consume entire drink within 10-15 minutes. Do not exceed 5 packets in any 24-hour period. May cause drowsiness. Do not drive or operate heavy machinery after taking this medication. Do not combine with other products that contain acetaminophen. Amount is 6 packet.   The patient's prescriptions will be sent to:   [facility] [id number]   [facility] [id number]   Phone: [phone]     Fax: [phone]   Patient informed to purchase OTC medication.   Education:    Condition and causes    Prevention    Treatment and self-care    When to call provider   ----------   Electronically signed by MIKE Messer on 2022-12-14 at 11:35AM   ----------   Patient Interview Transcript:   Please carefully consider each question and answer as best you can. This helps your provider give you the best care. Which of these symptoms are bothering you? Select all that apply.    Cough    Shortness of breath    Stuffed-up nose or sinuses    Runny nose    Loss of smell or taste    Hoarse voice or loss of voice    Chills    Fatigue or tiredness    Nausea or vomiting   Not selected:    Fever    Itchy or watery eyes    Itchy nose or sneezing    Sore throat    Headache    Sweats    Muscle or body aches    Diarrhea    I don't have any of these symptoms   Since your current symptoms started, have you been tested for COVID-19? Select one.    No   Not selected:    Yes   Have you gotten the COVID-19 vaccine? Select one.    No   Not selected:    Yes   In the last 14 days, have you traveled outside of your local community? This includes travel by car, RV, bus, train, or plane. Travel increases your chances of getting and spreading COVID-19. Select one.    Yes   Not selected:    No   In the last 14 days, have you had close contact with someone who has coronavirus (COVID-19)? \"Close contact\" means any of these: - Living in the same household as someone " "with COVID-19. - Caring for someone with COVID-19. - Being within 6 feet of someone with COVID-19 for a total of at least 15 minutes over a 24-hour period. For example, three 5-minute exposures for a total of 15 minutes. - Being in direct contact with respiratory droplets from someone with COVID-19 (being coughed on, kissing, sharing utensils). Select one.    No, not that I know of   Not selected:    Yes, a confirmed case    Yes, a suspected case   When did your current symptoms start? Select one.    6 to 9 days ago   Not selected:    Less than 48 hours ago    3 to 5 days ago    10 to 14 days ago    2 to 3 weeks ago    3 to 4 weeks ago    More than a month ago   Do you know the exact date your symptoms started? If so, enter the date as MM/DD/YY. Select one.    Yes (specify): 12/08/22   Not selected:    No   Did your symptoms come on suddenly or gradually? Select one.    Suddenly   Not selected:    Gradually    I'm not sure   Have your symptoms improved at all since they began? Select one.    I'm not sure   Not selected:    Yes, but they haven't gone away completely    Yes, but then they came back worse than before    No   How would you describe your shortness of breath? Sometimes shortness of breath can be a sign of a more serious condition. Select one.    Moderate (it's bad, but I can still do simple things like get dressed, bathe, or comb my hair)   Not selected:    Mild (about what I normally have with a cold)    Severe (it's so bad that I can't do simple things like get dressed, bathe, or comb my hair)   We'd like to find out more about your shortness of breath. Try to say the following sentence out loud: \"I went to the store today to buy bread, milk, and eggs.\" Are you able to get to the end of the sentence without stopping for breath? If not, you may need emergency care.    No   Not selected:    Yes   Are you able to take a full, deep breath? A full, deep breath means inhaling for 3 to 4 seconds. If you can't " "do this, you may need to seek emergency care. Select one.    Yes   Not selected:    No   Do you cough so hard that it's made you gag or vomit? By gag, we mean has your coughing made you choke or dry heave? Select all that apply.    Yes, my coughing has made me gag   Not selected:    Yes, my coughing has made me vomit    No   When is your cough the worst? Select all that apply.    I haven't noticed a difference depending on time of day   Not selected:    In the morning, or when I wake up    During the day    At nighttime, or while I'm sleeping   Are you coughing up mucus or phlegm? Select one.    Yes, a little   Not selected:    No, my cough is dry    Yes, a lot   What color is most of the mucus or phlegm that you're coughing up? Select one.    I'm not sure   Not selected:    Clear    White/frothy    Yellow    Green    Red or pink   You mentioned having a stuffy nose or sinus congestion. Do you feel pain or pressure in your sinuses?    No   Not selected:    Yes   What color is your nasal drainage? Select one.    I'm not sure   Not selected:    Clear    White    Yellow    Green    My nose is stuffed but not draining or running   Is your nasal drainage thick or thin? Select one.    Thin   Not selected:    Thick   Is there any drainage (mucus) going down the back of your throat? This kind of drainage is also called \"postnasal drip.\" Select one.    Yes   Not selected:    No, not that I know of   Since your symptoms started, have you felt dizzy? Select one.    Yes, and it makes it hard to stand, walk, or do daily activities   Not selected:    Yes, but I can continue with my regular daily activities    No   Do you have chest pain? You might also feel it as discomfort, aching, tightness, or squeezing in the chest. Select one.    No   Not selected:    Yes   Have you urinated at least 3 times in the last 24 hours? Select one.    Yes   Not selected:    No   Changes in alertness or awareness may mean you need emergency care. " Since your symptoms started, have you had any of these? Select all that apply.    None of the above   Not selected:    Confusion    Slurred speech    Not knowing where you are or what day it is    Difficulty staying conscious    Fainting or passing out   Do your symptoms include a whistling sound, or wheezing, when you breathe? Select one.    Yes   Not selected:    No    I'm not sure   Early in this interview, you told us you were hoarse or you'd lost your voice. How would you describe the changes to your voice? Select one.    I can barely talk at all   Not selected:    It just sounds a little raspy    It's harder than usual to talk   Is it possible that you strained your voice? Singing, yelling, or talking more or louder than usual can cause voice strain. Select one.    No, not that I know of   Not selected:    Yes   Do you have any of these symptoms in your ear(s)? Select all that apply.    Fullness   Not selected:    Pain    Pressure    Crackling or popping    Plugged or blocked sensation    None of the above   Can you move your chin toward your chest?    Yes   Not selected:    No, my neck is too stiff   Are your glands/lymph nodes swollen, or does it hurt when you touch them?    No, not that I can tell   Not selected:    Yes   In the past week, has anyone around you (such as at school, work, or home) had a confirmed diagnosis of the flu? A confirmed diagnosis means that a nose swab was done to verify a flu infection. Select all that apply.    No, not that I know of   Not selected:    I live with someone who has the flu    I've been within touching distance of someone who has the flu    I've walked by, or sat about 3 feet away from, someone who has the flu    I've been in the same building as someone who has the flu   Have you ever been diagnosed with asthma? Select one.    Yes   Not selected:    No   Are your cold symptoms making your asthma worse? Select one.    Yes   Not selected:    No   What medications or  inhalers are you currently using for your asthma? Select all that apply.    I usually take medications for my asthma, but I ran out   Not selected:    Albuterol inhaler, as needed (such as ProAir, Proventil, Ventolin)    Inhaled steroid (such as Qvar, Pulmicort, Flovent)    Inhaled steroid with long-acting bronchodilator (such as Advair, Dulera, Symbicort)    I'm not sure    I'm not taking any medications for my asthma   Have you ever been diagnosed with chronic obstructive pulmonary disease (COPD)? Select one.    No, not that I know of   Not selected:    Yes   In the past month, have you taken antibiotics for similar symptoms? Examples of antibiotics include amoxicillin, amoxicillin-clavulanate (Augmentin), penicillin, cefdinir (Omnicef), doxycycline, and clindamycin (Cleocin). Select one.    No   Not selected:    Yes   Do you have allergies (pollen, dust mites, mold, animal dander)? Select one.    Yes   Not selected:    No, not that I know of   What kind of allergies do you have? Select all that apply.    Seasonal allergies (hay fever)    Dust allergies   Not selected:    Pet allergies    None of the above    I'm not sure   Do you think your symptoms could be allergy-related? Select one.    No   Not selected:    Yes    I'm not sure   Have you had a flu shot this season? Select one.    No, not that I know of   Not selected:    Yes, less than 2 weeks ago    Yes, 2 to 4 weeks ago    Yes, 1 to 3 months ago    Yes, 3 to 6 months ago    Yes, more than 6 months ago   Have you gone through menopause? Select one.    Yes   Not selected:    No    I'm going through it now   The flu and COVID-19 can be more serious for people with certain conditions or characteristics. These questions help us figure out if you or anyone you live with is at higher risk for complications from these infections. Do either of these statements apply to you? Select all that apply.    None of the above   Not selected:    I'm  or Native  [place]    I'm a healthcare worker   Do you smoke tobacco? Select one.    No, I quit   Not selected:    Yes, every day    Yes, some days    No   Do you have any of these conditions? Select all that apply.    None of the above   Not selected:    Chronic lung disease, such as cystic fibrosis or interstitial fibrosis    Heart disease, such as congenital heart disease, congestive heart failure, or coronary artery disease    Disorder of the brain, spinal cord, or nerves and muscles, such as dementia, cerebral palsy, epilepsy, muscular dystrophy, or developmental delay    Metabolic disorder or mitochondrial disease    Cerebrovascular disease, such as stroke or another condition affecting the blood vessels or blood supply to the brain    Down syndrome    Mood disorder, including depression or schizophrenia spectrum disorders    Substance use disorder, such as alcohol, opioid, or cocaine use disorder    Tuberculosis   Do you live in a group care setting? Examples include: - Nursing home - Residential care - Psychiatric treatment facility - [facility] - [facility] - Homeless shelter - Foster care setting Select one.    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 Select one.    No   Not selected:    Yes   People with a very high body mass index (BMI) are at higher risk for developing complications from the flu and severe illness from COVID-19. To determine your BMI, we need to know your weight and height. Please enter your weight (in pounds).    Weight   Please enter your height.    Height   Do you have any of these conditions that can affect the immune system? Scroll to see all options. Select all that apply.    Rheumatoid arthritis   Not selected:    History of bone marrow transplant    Chronic kidney disease    Chronic liver disease (including cirrhosis)    HIV/AIDS    Inflammatory bowel disease (Crohn's disease or ulcerative colitis)    Lupus    Moderate to severe plaque psoriasis     Multiple sclerosis    Sickle cell anemia    Alpha or beta thalassemia    History of solid organ transplant (kidney, liver, or heart)    History of spleen removal    An autoimmune disorder not listed here    A condition requiring treatment with long-term use of oral steroids (such as prednisone, prednisolone, or dexamethasone)    None of these   Do you take medications for your condition? This includes oral and injectable medications that are taken daily, weekly, or monthly. Select one.    No   Not selected:    Yes, regularly    Yes, for flare-ups only   Have you ever been diagnosed with cancer? Select one.    No   Not selected:    Yes, I have cancer now    Yes, but I'm in remission   Do any of these apply to you? Select all that apply.    I'm in close contact with a child in    Not selected:    I've been hospitalized within the last 5 days    I have diabetes    None of the above   Do any of these apply to the people who live with you? Select all that apply.    An adult 65 or older   Not selected:    A child under the age of 5    A person who is pregnant    A person who has given birth, had a miscarriage, had a pregnancy loss, or had an  in the last 2 weeks    An  or Native [place]    None of the above   Does any member of your household have any of these medical conditions? Select all that apply.    Asthma    Diabetes   Not selected:    Disorders of the brain, spinal cord, or nerves and muscles, such as dementia, cerebral palsy, epilepsy, muscular dystrophy, or developmental delay    Chronic lung disease, such as COPD or cystic fibrosis    Heart disease, such as congenital heart disease, congestive heart failure, or coronary artery disease    Cerebrovascular disease, such as stroke or another condition affecting the blood vessels or blood supply to the brain    Blood disorders, such as sickle cell disease    Metabolic disorders such as inherited metabolic disorders or mitochondrial  disease    Kidney disorders    Liver disorders    Weakened immune system due to illness or medications such as chemotherapy or steroids    Children under the age of 19 who are on long-term aspirin therapy    Extreme obesity (BMI > 40)    None of the above   Do you have any of these conditions? Scroll to see all options. Select all that apply.    None of the above   Not selected:    Aspirin triad (also known as Samter's triad or ASA triad)    Asthma or hives from taking aspirin or other NSAIDs, such as ibuprofen or naproxen    Blockage or narrowing of the blood vessels of the heart    Blood dyscrasia, such anemia, leukemia, lymphoma, or myeloma    Bone marrow depression    Catecholamine-releasing paraganglioma    Blood clotting disorder    Congenital long QT syndrome    Depression    Difficulty urinating or completely emptying your bladder    Uncorrected electrolyte abnormalities    Fungal infection    Gastrointestinal (GI) bleeding    Gastrointestinal (GI) obstruction    G6PD deficiency    Recent heart attack    High blood pressure    Irregular heartbeat or heart rhythm    Mononucleosis (mono)    Myasthenia gravis    Parkinson's disease    Pheochromocytoma    Reye syndrome    Seizure disorder    Ulcerative colitis   Have you ever had either of these conditions? Select all that apply.    No   Not selected:    Metoclopramide-associated dystonic reaction    Tardive dyskinesia   Just a few more questions about medications, and then you're finished. Have you used any non-prescription medications or nasal sprays for your current symptoms? Examples include saline sprays, decongestants, NyQuil, and Tylenol. Select one.    Yes   Not selected:    No   Which of these non-prescription medications have you tried? Scroll to see all options. Select all that apply.    Acetaminophen (Tylenol)    Guaifenesin (Mucinex)    Ibuprofen (Advil, Motrin, Midol)   Not selected:    Budesonide (Rhinocort)    Cetirizine (Zyrtec)     Chlorpheniramine (Aller-chlor, Chlor-Trimeton)    Cromolyn (NasalCrom)    Dextromethorphan (Delsym, Robitussin, Vicks DayQuil Cough)    Diphenhydramine (Benadryl)    Fexofenadine (Allegra)    Fluticasone (Flonase)    Guaifenesin/dextromethorphan (Delsym DM, Mucinex DM, Robitussin DM)    Ketotifen (Alaway, Zaditor)    Loratadine (Alavert, Claritin)    Naphazoline-pheniramine (Naphcon-A, Opcon-A, Visine-A)    Omeprazole (Prilosec)    Oxymetazoline (Afrin)    Phenylephrine (Sudafed)    Triamcinolone (Nasacort)    None of the above   Have you taken any monoamine oxidase inhibitor (MAOI) medications in the last 14 days? Examples include rasagiline (Azilect), selegiline (Eldepryl, Zelapar), isocarboxazid (Marplan), phenelzine (Nardil), and tranylcypromine (Parnate). Select one.    No, not that I know of   Not selected:    Yes   Do you take Kynmobi or Apokyn (apomorphine)? Select one.    No   Not selected:    Yes   Are you still taking these medications listed in your medical record? If you're not taking any of these, click Next. Select all that apply.    VITAMIN D (CHOLECALCIFEROL) PO   Are you taking any other medications, vitamins, or supplements? Select one.    No   Not selected:    Yes   Have you ever had an allergic or bad reaction to any medication? Select one.    No   Not selected:    Yes   Are you allergic to milk or to the proteins found in milk (for example, whey or casein)? A milk allergy is different from lactose intolerance. Select one.    No, not that I know of   Not selected:    Yes   Have you ever had jaundice or liver problems as a result of taking amoxicillin-clavulanate (Augmentin)? Jaundice is a condition in which the skin and the whites of the eyes turn yellow. Select all that apply.    No, not that I know of   Not selected:    Yes, jaundice    Yes, liver problems   Have you ever had jaundice or liver problems as a result of taking azithromycin (Zithromax, Zmax)? Jaundice is a condition in which the  skin and the whites of the eyes turn yellow. Select all that apply.    No, not that I know of   Not selected:    Yes, jaundice    Yes, liver problems   Do you need a doctor's note? A doctor's note confirms that you received care today and states when you can return to school or work. It does not contain information about your diagnosis or treatment plan. Your provider will make the final decision on whether to give you a doctor's note and for how long. Doctor's notes CANNOT be backdated. We can't provide medical leave paperwork through this type of visit. If more paperwork is needed to request time off, contact your primary care provider. Select one.    No   Not selected:    Today only (1 day)    Today and tomorrow (2 days)    3 days    5 days    7 days    10 days    14 days   Is there anything else you'd like to tell us about your symptoms?    Couldn't do telehealth because I've mostly lost my voice. The cough interrupts if I try the breathing tests in the questions. The best description on breathing is I can't walk more than a minute without being out of breath and getting lightheaded. I have an oximeter that has consistently measured over 90. We suspect it came from our grandchild and suspect the flu, but nobody has been tested other than my  and I (he has it but less sick) did the at home COVID test, both negative.   ----------   Medical history   The following information was received from the EMR on December 14, 2022.   Allergies:    No Known Allergies   - Allergy Type:   - Reaction:   - Severity:   - Clinical Status: Active   - Verification Status: Confirmed   Medications:    ADVAIR -21 MCG/ACT IN AERO   - Route: Inhalation   - Start Date: March 25, 2022   - End Date: None   - Status: Active    pantoprazole (PROTONIX) EC tablet   - Route: Oral   - Start Date: September 12, 2022   - End Date: None   - Status: Active    VITAMIN D (CHOLECALCIFEROL) PO   - Route: Oral   - Start Date: Danyelle 15, 2021    - End Date: None   - Status: Active    XIIDRA 5 % OP SOLN   - Route:   - Start Date: October 11, 2022   - End Date: None   - Status: Active    cetirizine (zyrTEC) tablet   - Route: Oral   - Start Date: March 25, 2022   - End Date: None   - Status: Active    albuterol (PROVENTIL HFA;VENTOLIN HFA;PROAIR HFA) inhaler   - Route: Inhalation   - Start Date: Danyelle 15, 2021   - End Date: None   - Status: Active    azelastine (ASTELIN) nasal spray   - Route:   - Start Date: October 11, 2022   - End Date: None   - Status: Active    simvastatin (ZOCOR) tablet   - Route: Oral   - Start Date: October 11, 2022   - End Date: None   - Status: Active    LINZESS 145 MCG PO CAPS   - Route:   - Start Date: October 11, 2022   - End Date: None   - Status: Active    fluticasone (FLONASE) nasal spray   - Route: Nasal   - Start Date: March 25, 2022   - End Date: None   - Status: Active    montelukast (SINGULAIR) tablet 10 mg   - Route: Oral   - Start Date: October 11, 2022   - End Date: None   - Status: Active    baclofen (LIORESAL) tablet   - Route: Oral   - Start Date: October 11, 2022   - End Date: None   - Status: Active    DULoxetine (CYMBALTA) DR capsule   - Route:   - Start Date: October 21, 2022   - End Date: None   - Status: Active    gabapentin (NEURONTIN) capsule   - Route: Oral   - Start Date: October 11, 2022   - End Date: None   - Status: Active    lisinopril-hydroCHLOROthiazide (PRINZIDE,ZESTORETIC) tablet 10-12.5 mg   - Route: Oral   - Start Date: October 11, 2022   - End Date: None   - Status: Active    estradiol (ESTRACE) vaginal cream 0.1 mg/gram   - Route:   - Start Date: October 11, 2022   - End Date: None   - Status: Active   Problem list:    Anemia   - Category: Problem List Item   - Health Status:   - Start Date: Danyelle 15, 2021   - End Date: None   - Status: Active    Anxiety   - Category: Problem List Item   - Health Status:   - Start Date: Danyelle 15, 2021   - End Date: None   - Status: Active    COPD (chronic  obstructive pulmonary disease) (HCC)   - Category: Problem List Item   - Health Status:   - Start Date: Danyelle 15, 2021   - End Date: None   - Status: Active    Deviated nasal septum   - Category: Problem List Item   - Health Status:   - Start Date: Danyelle 15, 2021   - End Date: None   - Status: Active    Encounter for long-term (current) use of other medications   - Category: Problem List Item   - Health Status:   - Start Date: Danyelle 15, 2021   - End Date: None   - Status: Active    Fibromyalgia   - Category: Problem List Item   - Health Status:   - Start Date: Danyelle 15, 2021   - End Date: None   - Status: Active    HTN (hypertension)   - Category: Problem List Item   - Health Status:   - Start Date: Danyelle 15, 2021   - End Date: None   - Status: Active    JE (obstructive sleep apnea)   - Category: Problem List Item   - Health Status:   - Start Date: Danyelle 15, 2021   - End Date: None   - Status: Active    Personal history of tobacco use, presenting hazards to health   - Category: Problem List Item   - Health Status:   - Start Date: Danyelle 15, 2021   - End Date: None   - Status: Active    Generalized abdominal pain   - Category: Problem List Item   - Health Status:   - Start Date: Danyelle 15, 2021   - End Date: October 11, 2022   - Status: Resolved    Neck pain   - Category: Problem List Item   - Health Status:   - Start Date: Danyelle 15, 2021   - End Date: None   - Status: Active    Tachycardia   - Category: Problem List Item   - Health Status:   - Start Date: March 25, 2022   - End Date: None   - Status: Active    Colon cancer screening   - Category: Problem List Item   - Health Status:   - Start Date: March 25, 2022   - End Date: October 11, 2022   - Status: Resolved    Constipation   - Category: Problem List Item   - Health Status:   - Start Date: March 25, 2022   - End Date: None   - Status: Active    Hypertension   - Category: Problem List Item   - Health Status:   - Start Date: March 25, 2022   - End Date: October 11, 2022    - Status: Resolved    Vitamin D deficiency   - Category: Problem List Item   - Health Status:   - Start Date: March 25, 2022   - End Date: None   - Status: Active    Mixed hyperlipidemia   - Category: Problem List Item   - Health Status:   - Start Date: March 25, 2022   - End Date: None   - Status: Active    Heartburn   - Category: Problem List Item   - Health Status:   - Start Date: April 25, 2022   - End Date: None   - Status: Active

## 2022-12-14 NOTE — TELEPHONE ENCOUNTER
Caller: SAMMY HOWARD    Relationship: Emergency Contact    Best call back number: [phone]    What was the call regarding: PLEASE CHANGE PHARMACY ON:  predniSONE (DELTASONE) 10 MG (21) dose pack [[id number]]     [facility] [id number] - [place], [place] - [facility] [address] AT [facility] [address] - [phone]  - [phone] FX  Order Details    Do you require a callback: YES

## 2022-12-15 ENCOUNTER — PATIENT MESSAGE (OUTPATIENT)
Dept: FAMILY MEDICINE CLINIC | Facility: TELEHEALTH | Age: 62
End: 2022-12-15

## 2022-12-15 RX ORDER — ACETAMINOPHEN, DEXTROMETHORPHAN HBR, PHENYLEPHRINE HCL 650; 20; 10 MG/30ML; MG/30ML; MG/30ML
5 SYRUP ORAL 3 TIMES DAILY PRN
Qty: 118 ML | Refills: 0 | Status: SHIPPED | OUTPATIENT
Start: 2022-12-15

## 2022-12-15 RX ORDER — GUAIFENESIN AND DEXTROMETHORPHAN HYDROBROMIDE 600; 30 MG/1; MG/1
1 TABLET, EXTENDED RELEASE ORAL EVERY 12 HOURS SCHEDULED
Qty: 20 TABLET | Refills: 0 | Status: SHIPPED | OUTPATIENT
Start: 2022-12-15

## 2022-12-15 RX ORDER — BENZONATATE 200 MG/1
200 CAPSULE ORAL 3 TIMES DAILY PRN
Qty: 30 CAPSULE | Refills: 0 | Status: SHIPPED | OUTPATIENT
Start: 2022-12-15

## 2023-01-03 RX ORDER — LINACLOTIDE 145 UG/1
CAPSULE, GELATIN COATED ORAL
Qty: 90 CAPSULE | Refills: 0 | Status: SHIPPED | OUTPATIENT
Start: 2023-01-03 | End: 2023-03-31 | Stop reason: SDUPTHER

## 2023-03-31 RX ORDER — PANTOPRAZOLE SODIUM 20 MG/1
20 TABLET, DELAYED RELEASE ORAL DAILY
Qty: 90 TABLET | Refills: 0 | Status: SHIPPED | OUTPATIENT
Start: 2023-03-31

## 2023-03-31 RX ORDER — LINACLOTIDE 145 UG/1
CAPSULE, GELATIN COATED ORAL
Qty: 90 CAPSULE | Refills: 0 | Status: SHIPPED | OUTPATIENT
Start: 2023-03-31

## 2023-03-31 NOTE — TELEPHONE ENCOUNTER
Patient requesting a refill of Linzess 145 mcg, order pended.  Last o/v-4/25/22  Last refill-1/3/23  Next o/v-none

## 2023-03-31 NOTE — TELEPHONE ENCOUNTER
Pt is requesting pantoprazole #90 3rf. Order pended.   Last ov: 4/25/23  Next ov: none  Last refill: 9/12/22

## 2023-04-12 ENCOUNTER — OFFICE VISIT (OUTPATIENT)
Dept: FAMILY MEDICINE CLINIC | Age: 63
End: 2023-04-12
Payer: OTHER GOVERNMENT

## 2023-04-12 VITALS
DIASTOLIC BLOOD PRESSURE: 74 MMHG | OXYGEN SATURATION: 97 % | WEIGHT: 170 LBS | HEIGHT: 62 IN | HEART RATE: 85 BPM | BODY MASS INDEX: 31.28 KG/M2 | SYSTOLIC BLOOD PRESSURE: 128 MMHG

## 2023-04-12 DIAGNOSIS — E78.2 MIXED HYPERLIPIDEMIA: ICD-10-CM

## 2023-04-12 DIAGNOSIS — K59.04 CHRONIC IDIOPATHIC CONSTIPATION: ICD-10-CM

## 2023-04-12 DIAGNOSIS — F41.9 ANXIETY: ICD-10-CM

## 2023-04-12 DIAGNOSIS — I10 PRIMARY HYPERTENSION: ICD-10-CM

## 2023-04-12 DIAGNOSIS — R53.83 OTHER FATIGUE: ICD-10-CM

## 2023-04-12 DIAGNOSIS — M79.7 FIBROMYALGIA: Primary | ICD-10-CM

## 2023-04-12 DIAGNOSIS — Z79.899 ENCOUNTER FOR LONG-TERM (CURRENT) USE OF OTHER MEDICATIONS: ICD-10-CM

## 2023-04-12 DIAGNOSIS — E55.9 VITAMIN D DEFICIENCY: ICD-10-CM

## 2023-04-12 DIAGNOSIS — Z91.09 ENVIRONMENTAL ALLERGIES: ICD-10-CM

## 2023-04-12 DIAGNOSIS — K21.9 GASTROESOPHAGEAL REFLUX DISEASE WITHOUT ESOPHAGITIS: ICD-10-CM

## 2023-04-12 DIAGNOSIS — Z86.2 HISTORY OF ANEMIA: ICD-10-CM

## 2023-04-12 PROBLEM — J45.909 ASTHMA: Status: ACTIVE | Noted: 2023-04-12

## 2023-04-12 PROBLEM — M75.121 NONTRAUMATIC COMPLETE TEAR OF RIGHT ROTATOR CUFF: Status: ACTIVE | Noted: 2022-11-28

## 2023-04-12 PROBLEM — R12 HEARTBURN: Status: RESOLVED | Noted: 2022-04-25 | Resolved: 2023-04-12

## 2023-04-12 RX ORDER — SIMVASTATIN 20 MG
20 TABLET ORAL NIGHTLY
Qty: 90 TABLET | Refills: 1 | Status: SHIPPED | OUTPATIENT
Start: 2023-04-12

## 2023-04-12 RX ORDER — TRETINOIN 0.5 MG/G
CREAM TOPICAL
COMMUNITY
Start: 2023-01-23

## 2023-04-12 RX ORDER — MONTELUKAST SODIUM 10 MG/1
10 TABLET ORAL NIGHTLY
Qty: 90 TABLET | Refills: 1 | Status: SHIPPED | OUTPATIENT
Start: 2023-04-12

## 2023-04-12 RX ORDER — GABAPENTIN 300 MG/1
300 CAPSULE ORAL 3 TIMES DAILY PRN
Qty: 270 CAPSULE | Refills: 1 | Status: SHIPPED | OUTPATIENT
Start: 2023-04-12

## 2023-04-12 RX ORDER — PANTOPRAZOLE SODIUM 20 MG/1
20 TABLET, DELAYED RELEASE ORAL DAILY
Qty: 90 TABLET | Refills: 0 | Status: SHIPPED | OUTPATIENT
Start: 2023-04-12

## 2023-04-12 RX ORDER — LISINOPRIL AND HYDROCHLOROTHIAZIDE 12.5; 1 MG/1; MG/1
1 TABLET ORAL DAILY
Qty: 90 TABLET | Refills: 1 | Status: SHIPPED | OUTPATIENT
Start: 2023-04-12

## 2023-04-12 RX ORDER — DULOXETIN HYDROCHLORIDE 60 MG/1
60 CAPSULE, DELAYED RELEASE ORAL NIGHTLY
Qty: 90 CAPSULE | Refills: 1 | Status: SHIPPED | OUTPATIENT
Start: 2023-04-12

## 2023-04-12 RX ORDER — BACLOFEN 10 MG/1
10 TABLET ORAL 3 TIMES DAILY
Qty: 270 TABLET | Refills: 1 | Status: SHIPPED | OUTPATIENT
Start: 2023-04-12

## 2023-04-12 RX ORDER — HYDROXYZINE HYDROCHLORIDE 25 MG/1
25 TABLET, FILM COATED ORAL 3 TIMES DAILY PRN
Qty: 30 TABLET | Refills: 2 | Status: SHIPPED | OUTPATIENT
Start: 2023-04-12

## 2023-04-12 NOTE — PROGRESS NOTES
"Chief Complaint  Anxiety (6 month follow up ), Sleep Apnea, Hypertension, Hyperlipidemia, Vitamin D Deficiency, and Fibromyalgia    Subjective          Katia Spears presents to [facility]     Patient is a 62-year-old female who is here today for follow-up on fibromyalgia.  Current treatment is gabapentin 300 mg once to sometimes 3 times daily.  Baclofen 10 mg 3 times daily as needed.  Denies side effects and requests refills.    Anxiety is mostly stable on duloxetine 60 mg daily.  She would like to have an as needed medicine to take intermittently.  Denies medication side effects of duloxetine and requests refills.    Blood pressures under good control on lisinopril 10 mg with hydrochlorothiazide 12.5 mg daily.  Denies side effects and requests refills.    Asthma and allergies are stable with use of Singulair 10 mg at bedtime.  Denies side effects and requests refills.    Constipation is improved with use of Linzess 145 mcg daily.  Denies side effects and requests refills.    Acid reflux is stable with use of pantoprazole 20 mg daily.  Denies side effects and requests refills.    For hyperlipidemia she takes simvastatin 20 mg at bedtime.  Denies side effects and requests refills.    Hospitalized at [facility] for 6 days in December with pneumonia.  Is seeing a pulmonologist in follow-up and chest CT imaging is improving.  She has had some lingering fatigue since that time.  Sleeps well when she does fall asleep.  She is having a sleep study done soon.     Objective   Vital Signs:   Vitals:    04/12/23 1036   BP: 128/74   BP Location: Left arm   Patient Position: Sitting   Cuff Size: Large Adult   Pulse: 85   SpO2: 97%   Weight: 77.1 kg (170 lb)   Height: 157.5 cm (62\")      Body mass index is 31.09 kg/m².  Physical Exam  Vitals reviewed.   Constitutional:       General: She is not in acute distress.     Appearance: Normal appearance. She is well-developed.   Cardiovascular:      Rate and " Rhythm: Normal rate and regular rhythm.      Heart sounds: Normal heart sounds.   Pulmonary:      Effort: Pulmonary effort is normal.      Breath sounds: Normal breath sounds.   Musculoskeletal:      Right lower leg: No edema.      Left lower leg: No edema.   Skin:     General: Skin is warm and dry.   Neurological:      General: No focal deficit present.      Mental Status: She is alert.   Psychiatric:         Attention and Perception: Attention normal.         Mood and Affect: Mood and affect normal.         Behavior: Behavior normal.           Current Outpatient Medications:   •  Advair -21 MCG/ACT inhaler, Inhale 2 puffs 2 (Two) Times a Day., Disp: , Rfl:   •  albuterol sulfate  (90 Base) MCG/ACT inhaler, Inhale 1 puff Every 6 (Six) Hours As Needed., Disp: , Rfl:   •  baclofen (LIORESAL) 10 MG tablet, Take 1 tablet by mouth 3 (Three) Times a Day., Disp: 270 tablet, Rfl: 1  •  cetirizine (zyrTEC) 10 MG tablet, Take 1 tablet by mouth Every Night., Disp: , Rfl:   •  DULoxetine (CYMBALTA) 60 MG capsule, Take 1 capsule by mouth Every Night., Disp: 90 capsule, Rfl: 1  •  estradiol (ESTRACE) 0.1 MG/GM vaginal cream, , Disp: , Rfl:   •  fluticasone (FLONASE) 50 MCG/ACT nasal spray, 1 spray into the nostril(s) as directed by provider As Needed., Disp: , Rfl:   •  gabapentin (NEURONTIN) 300 MG capsule, Take 1 capsule by mouth 3 (Three) Times a Day As Needed (fibromyalgia)., Disp: 270 capsule, Rfl: 1  •  guaifenesin-dextromethorphan (MUCINEX DM)  MG tablet sustained-release 12 hour tablet, Take 1 tablet by mouth Every 12 (Twelve) Hours., Disp: 20 tablet, Rfl: 0  •  linaclotide (Linzess) 145 MCG capsule capsule, Take 1 capsule by mouth Every Morning Before Breakfast., Disp: 90 capsule, Rfl: 1  •  lisinopril-hydrochlorothiazide (PRINZIDE,ZESTORETIC) 10-12.5 MG per tablet, Take 1 tablet by mouth Daily., Disp: 90 tablet, Rfl: 1  •  montelukast (SINGULAIR) 10 MG tablet, Take 1 tablet by mouth Every Night.,  Disp: 90 tablet, Rfl: 1  •  pantoprazole (PROTONIX) 20 MG EC tablet, Take 1 tablet by mouth Daily., Disp: 90 tablet, Rfl: 0  •  simvastatin (ZOCOR) 20 MG tablet, Take 1 tablet by mouth Every Night., Disp: 90 tablet, Rfl: 1  •  tretinoin (RETIN-A) 0.05 % cream, APPLY A THIN LAYER EXTERNALLY TO THE AFFECTED AREA OF FACE EVERY NIGHT AT BEDTIME AS TOLERATED, Disp: , Rfl:   •  Triamcinolone Acetonide 80 MG/ML suspension, Inject 1 mL into the appropriate joint as directed by provider., Disp: , Rfl:   •  hydrOXYzine (ATARAX) 25 MG tablet, Take 1 tablet by mouth 3 (Three) Times a Day As Needed for Anxiety (insomnia)., Disp: 30 tablet, Rfl: 2   Past Medical History:   Diagnosis Date   • Anemia, unspecified    • Chronic obstructive pulmonary disease, unspecified    • Chronic sinusitis, unspecified    • Deviated nasal septum    • Dysthymic disorder    • Essential (primary) hypertension    • Fibromyalgia    • Generalized anxiety disorder    • GERD (gastroesophageal reflux disease)    • Hypo-osmolality and hyponatremia    • Mixed hyperlipidemia    • Obstructive sleep apnea (adult) (pediatric)    • Osteopenia    • Osteoporosis    • Other difficulties with micturition    • Other long term (current) drug therapy    • Vitamin D deficiency, unspecified          Result Review :                Assessment and Plan    Diagnoses and all orders for this visit:    1. Fibromyalgia (Primary)  -     POC Urine Drug Screen [facility]  -     DULoxetine (CYMBALTA) 60 MG capsule; Take 1 capsule by mouth Every Night.  Dispense: 90 capsule; Refill: 1  -     baclofen (LIORESAL) 10 MG tablet; Take 1 tablet by mouth 3 (Three) Times a Day.  Dispense: 270 tablet; Refill: 1  -     gabapentin (NEURONTIN) 300 MG capsule; Take 1 capsule by mouth 3 (Three) Times a Day As Needed (fibromyalgia).  Dispense: 270 capsule; Refill: 1    2. Primary hypertension  Assessment & Plan:  Monitor blood pressure at home.  Report any elevated readings.  Follow-up in 6  months or sooner if concerns.    Orders:  -     Comprehensive metabolic panel; Future  -     lisinopril-hydrochlorothiazide (PRINZIDE,ZESTORETIC) 10-12.5 MG per tablet; Take 1 tablet by mouth Daily.  Dispense: 90 tablet; Refill: 1    3. Encounter for long-term (current) use of other medications  -     POC Urine Drug Screen [facility]  -     gabapentin (NEURONTIN) 300 MG capsule; Take 1 capsule by mouth 3 (Three) Times a Day As Needed (fibromyalgia).  Dispense: 270 capsule; Refill: 1    4. Anxiety  Assessment & Plan:  Add hydroxyzine as needed.  May cause drowsiness.  Follow-up if not improving.    Orders:  -     hydrOXYzine (ATARAX) 25 MG tablet; Take 1 tablet by mouth 3 (Three) Times a Day As Needed for Anxiety (insomnia).  Dispense: 30 tablet; Refill: 2  -     DULoxetine (CYMBALTA) 60 MG capsule; Take 1 capsule by mouth Every Night.  Dispense: 90 capsule; Refill: 1    5. Gastroesophageal reflux disease without esophagitis  -     pantoprazole (PROTONIX) 20 MG EC tablet; Take 1 tablet by mouth Daily.  Dispense: 90 tablet; Refill: 0    6. Mixed hyperlipidemia  -     Lipid panel; Future  -     simvastatin (ZOCOR) 20 MG tablet; Take 1 tablet by mouth Every Night.  Dispense: 90 tablet; Refill: 1    7. History of anemia  -     CBC w AUTO Differential; Future    8. Chronic idiopathic constipation  -     linaclotide (Linzess) 145 MCG capsule capsule; Take 1 capsule by mouth Every Morning Before Breakfast.  Dispense: 90 capsule; Refill: 1    9. Other fatigue  Assessment & Plan:  Further treatment pending lab results.    Orders:  -     TSH; Future  -     Vitamin B12; Future    10. Vitamin D deficiency  -     Vitamin D 25 hydroxy; Future    11. Environmental allergies  -     montelukast (SINGULAIR) 10 MG tablet; Take 1 tablet by mouth Every Night.  Dispense: 90 tablet; Refill: 1      Follow Up    No follow-ups on file.  Patient was given instructions and counseling regarding her condition or for health maintenance  advice. Please see specific information pulled into the AVS if appropriate.

## 2023-04-14 LAB
25(OH)D3+25(OH)D2 SERPL-MCNC: 29.5 NG/ML (ref 30–100)
ALBUMIN SERPL-MCNC: 4.4 G/DL (ref 3.8–4.8)
ALBUMIN/GLOB SERPL: 1.9 {RATIO} (ref 1.2–2.2)
ALP SERPL-CCNC: 104 IU/L (ref 44–121)
ALT SERPL-CCNC: 19 IU/L (ref 0–32)
AMBIG ABBREV CMP14 DEFAULT: NORMAL
AMBIG ABBREV LP DEFAULT: NORMAL
AST SERPL-CCNC: 22 IU/L (ref 0–40)
BASOPHILS # BLD AUTO: 0.1 X10E3/UL (ref 0–0.2)
BASOPHILS NFR BLD AUTO: 1 %
BILIRUB SERPL-MCNC: 0.3 MG/DL (ref 0–1.2)
BUN SERPL-MCNC: 10 MG/DL (ref 8–27)
BUN/CREAT SERPL: 11 (ref 12–28)
CALCIUM SERPL-MCNC: 10 MG/DL (ref 8.7–10.3)
CHLORIDE SERPL-SCNC: 100 MMOL/L (ref 96–106)
CHOLEST SERPL-MCNC: 207 MG/DL (ref 100–199)
CO2 SERPL-SCNC: 28 MMOL/L (ref 20–29)
CREAT SERPL-MCNC: 0.95 MG/DL (ref 0.57–1)
EGFRCR SERPLBLD CKD-EPI 2021: 67 ML/MIN/1.73
EOSINOPHIL # BLD AUTO: 0.1 X10E3/UL (ref 0–0.4)
EOSINOPHIL NFR BLD AUTO: 3 %
ERYTHROCYTE [DISTWIDTH] IN BLOOD BY AUTOMATED COUNT: 12 % (ref 11.7–15.4)
GLOBULIN SER CALC-MCNC: 2.3 G/DL (ref 1.5–4.5)
GLUCOSE SERPL-MCNC: 87 MG/DL (ref 70–99)
HCT VFR BLD AUTO: 39.4 % (ref 34–46.6)
HDLC SERPL-MCNC: 69 MG/DL
HGB BLD-MCNC: 13.3 G/DL (ref 11.1–15.9)
IMM GRANULOCYTES # BLD AUTO: 0 X10E3/UL (ref 0–0.1)
IMM GRANULOCYTES NFR BLD AUTO: 0 %
LDLC SERPL CALC-MCNC: 116 MG/DL (ref 0–99)
LYMPHOCYTES # BLD AUTO: 1.8 X10E3/UL (ref 0.7–3.1)
LYMPHOCYTES NFR BLD AUTO: 37 %
MCH RBC QN AUTO: 30 PG (ref 26.6–33)
MCHC RBC AUTO-ENTMCNC: 33.8 G/DL (ref 31.5–35.7)
MCV RBC AUTO: 89 FL (ref 79–97)
MONOCYTES # BLD AUTO: 0.3 X10E3/UL (ref 0.1–0.9)
MONOCYTES NFR BLD AUTO: 7 %
NEUTROPHILS # BLD AUTO: 2.6 X10E3/UL (ref 1.4–7)
NEUTROPHILS NFR BLD AUTO: 52 %
PLATELET # BLD AUTO: 385 X10E3/UL (ref 150–450)
POTASSIUM SERPL-SCNC: 4.6 MMOL/L (ref 3.5–5.2)
PROT SERPL-MCNC: 6.7 G/DL (ref 6–8.5)
RBC # BLD AUTO: 4.43 X10E6/UL (ref 3.77–5.28)
SODIUM SERPL-SCNC: 140 MMOL/L (ref 134–144)
TRIGL SERPL-MCNC: 128 MG/DL (ref 0–149)
TSH SERPL DL<=0.005 MIU/L-ACNC: 1.75 UIU/ML (ref 0.45–4.5)
VIT B12 SERPL-MCNC: 468 PG/ML (ref 232–1245)
VLDLC SERPL CALC-MCNC: 22 MG/DL (ref 5–40)
WBC # BLD AUTO: 4.9 X10E3/UL (ref 3.4–10.8)

## 2023-04-16 NOTE — PROGRESS NOTES
You are not anemic.  Blood sugar, kidney, liver and thyroid function are normal.  Vitamin d is slightly low.  I recommend taking vitamin d supplement , or increasing current vitamin d supplement by 1000 IU daily.  Cholesterol is slightly higher.  You could increase dose of simvastatin or change simvastatin to atorvastatin.  The other option is to work on lower cholesterol diet and increase exercise.

## 2023-04-20 ENCOUNTER — TELEPHONE (OUTPATIENT)
Dept: FAMILY MEDICINE CLINIC | Age: 63
End: 2023-04-20

## 2023-04-20 NOTE — TELEPHONE ENCOUNTER
Caller: Antionettemikal Katia    Relationship: Self    Best call back number: [phone]    What is the medical concern/diagnosis: RIGHT SHOULDER ARTHRITIS    What specialty or service is being requested: ORTHOPEDIC SURGEON    What is the provider, practice or medical service name: UNKNOWN    What is the office location: UNKNOWN    What is the office phone number: [place]    Any additional details: PATIENT STATED THAT SHE SEES DR. DUMAS FOR HER SHOULDER AND THAT HE IS MOVING OUT OF THE STATE AND NEEDS ANOTHER REFERRAL. CALLER STATED THAT SHE WILL NEED A SHOULDER REPLACEMENT AND SHE RECEIVES STEROID SHOTS EVERY 3 MONTHS.CALLER STATED THAT SHE WOULD LIKE [facility]'S RECOMMENDATION.

## 2023-04-24 ENCOUNTER — TELEPHONE (OUTPATIENT)
Dept: FAMILY MEDICINE CLINIC | Age: 63
End: 2023-04-24
Payer: OTHER GOVERNMENT

## 2023-04-24 NOTE — TELEPHONE ENCOUNTER
Pt states that she would prefer to see Dr. Gorman because she thinks she will eventually have to have surgery, but if we can get her in to [facility] or one of the other orthos that you advised, she is agreeable to that as well.  She just feels like she needs to be seen soon.

## 2023-04-24 NOTE — TELEPHONE ENCOUNTER
Options for orthopedic surgeon would be dr braxton or dr huynh with [facility].  [facility] in [place] is another option.

## 2023-04-25 DIAGNOSIS — G89.29 CHRONIC RIGHT SHOULDER PAIN: Primary | ICD-10-CM

## 2023-04-25 DIAGNOSIS — M25.511 CHRONIC RIGHT SHOULDER PAIN: Primary | ICD-10-CM

## 2023-04-28 ENCOUNTER — TELEPHONE (OUTPATIENT)
Dept: ORTHOPEDIC SURGERY | Facility: CLINIC | Age: 63
End: 2023-04-28
Payer: OTHER GOVERNMENT

## 2023-04-28 NOTE — TELEPHONE ENCOUNTER
Caller: GERRI HOWARD    Relationship to patient: SELF    Best call back number: [phone]    Patient is needing: NEW PATIENT FROM DR. DUMAS- SCHEDULED 1ST AVAIL WITH DR. RAMOS  08/17/2023- PATIENT STATES SHE IS DUE FOR A STEROID INJECTION AND DR. DUMAS IS NOT TAKING ANY APPTS- STATES SHE WILL BE LONG PAST WINDOW FOR INJECTION IF SHE HAS TO WAIT UNTIL HER APPT IN AUGUST.  PATIENT ASKING IF SHE CAN GET AN INJECTION IN THE MEAN TIME WHILE WAITING FOR HER NEW PATIENT APPT TO COME AROUND?

## 2023-04-28 NOTE — TELEPHONE ENCOUNTER
Left a message for the patient to return our call     If she is needing a sooner appointment we can see if [facility] has anything available to get established and for possible injection since she does the majority of his injections.

## 2023-05-01 ENCOUNTER — OFFICE VISIT (OUTPATIENT)
Dept: SLEEP MEDICINE | Facility: HOSPITAL | Age: 63
End: 2023-05-01
Payer: OTHER GOVERNMENT

## 2023-05-01 VITALS
OXYGEN SATURATION: 97 % | HEIGHT: 62 IN | HEART RATE: 85 BPM | SYSTOLIC BLOOD PRESSURE: 131 MMHG | BODY MASS INDEX: 31.78 KG/M2 | DIASTOLIC BLOOD PRESSURE: 77 MMHG | WEIGHT: 172.7 LBS

## 2023-05-01 DIAGNOSIS — E66.9 CLASS 1 OBESITY: ICD-10-CM

## 2023-05-01 DIAGNOSIS — G47.8 NON-RESTORATIVE SLEEP: ICD-10-CM

## 2023-05-01 DIAGNOSIS — G47.10 HYPERSOMNIA: ICD-10-CM

## 2023-05-01 DIAGNOSIS — R06.83 SNORING: ICD-10-CM

## 2023-05-01 DIAGNOSIS — R53.83 OTHER FATIGUE: ICD-10-CM

## 2023-05-01 DIAGNOSIS — G47.30 OBSERVED SLEEP APNEA: Primary | ICD-10-CM

## 2023-05-01 PROBLEM — E66.811 CLASS 1 OBESITY: Status: ACTIVE | Noted: 2023-05-01

## 2023-05-01 PROCEDURE — G0463 HOSPITAL OUTPT CLINIC VISIT: HCPCS

## 2023-05-01 RX ORDER — ZOLPIDEM TARTRATE 5 MG/1
5 TABLET ORAL TAKE AS DIRECTED
Qty: 2 TABLET | Refills: 0 | Status: SHIPPED | OUTPATIENT
Start: 2023-05-01

## 2023-05-01 NOTE — PROGRESS NOTES
[facility] [address] [address]  [place]   [place] [address]  Phone: [phone]  Fax: [phone]      Katia Spears  [id number]   1960  62 y.o.  female      Referring physician/provider and PCP Esha Galeana APRN    Type of service: Initial Sleep Medicine Consult.  Date of service: 5/1/2023      Chief Complaint   Patient presents with   • Witnessed Apnea   • Snoring   • Non-restorative Sleep   • Fatigue   • Dry Mouth   • Obesity   • Daytime Sleepiness       History of present illness;  Thank you for asking to see Katia Spears, 62 y.o.. The patient was seen today on 5/1/2023 at [facility].  The patient presents today with symptoms of snoring, non-restorative sleep and witnessed apneas. The symptoms are present for more than 10 years and they are persistent in nature.  The snoring is present in all positions and it is loud.  Patient has no prior surgery namely tonsillectomy, nasal surgery and UPPP.   She is a retired.  She also has a history of fibromyalgia and has difficulty falling asleep.  She has all the symptoms of sleep apnea with snoring and daytime excessive sleepiness.  About 10 years ago she was tested had a CPAP but she stopped using it long time ago.  Her symptoms are getting worse    Patient gives the following sleep history.  Sleep schedule:  Bedtime: Midnight to 1 AM  Wake time: 7 AM to 8:30 AM  Normally takes about 30-60 minutes to fall asleep  Average hours of sleep 6-7  Number of naps per day none  Symptoms   In addition to snoring, nonrestorative sleep and witnessed apneas patient gives the following associated symptoms.  Have you ever awakened gasping for breath, coughing, choking: Yes   Change in weight,  Yes gained 20 pounds  Morning headaches  No   Awaken with a sore throat or dry mouth  Yes   Leg jerking at night:  Yes   Crawly feeling/urge sensation to move in the legs: Yes   Teeth grinding:No   Have you ever awakened at night with a sour taste  "or burning sensation in your chest:  No   Do you have muscle weakness with laughing or anger or sleep paralysis:  No   Have you ever felt paralyzed while going to sleep or waking up:  No   Sleepwalking, nightmares, No   Nocturia (urination at night): 2 times per night  Memory Problem:No     MEDICAL CONDITIONS (PMH)   1. Asthma  2. Fibromyalgia  3. Anxiety  4. Depression  5. Hypertension    Social history:  Do you drive a commercial vehicle:  No   Shift work:  No   Tobacco use:  No   Alcohol use: 0 per week  Caffeinated drinks: 2    Family Hx (parents and siblings) (pertaining to sleep medicine)  1. Sleep apnea, 2 sisters  2. Thyroid disorder  3. Obesity    Medications: reviewed    Review of systems:  Positive symptoms are :  • Snoring  • Witnessed apnea  • Daytime excessive sleepiness with [place] Sleepiness Scale of Total score: 15   • Fatigue  • Nasal congestion  • Anxiety  • Chronic pain      Physical exam:  CONSTITUTINONAL:  Vitals:    05/01/23 0900   BP: 131/77   Pulse: 85   SpO2: 97%   Weight: 78.3 kg (172 lb 11.2 oz)   Height: 157.5 cm (62\")    Body mass index is 31.59 kg/m².   NOSE:no nasal septal defects, nasal passages are clear, no nasal polyps,   THROAT: tonsils are nonenlarged, tongue normal size, oral airway Mallampati class 3  NECK:Neck Circumference: 13.5 inches, trachea is in the midline, thyroid not enlarged  RESPIRATORY SYSTEM: Breath sounds are normal, there are no wheezes  CARDIOVASULAR SYSTEM: Heart sounds are regular rhythm and normal rate, no edema  NEUROLOGICAL SYSTEM: Oriented x 3, No speech defect, gait is normal  PSYCHIATRIC SYSTEM: Mood is normal, thought content is normal    Labs reviewed.  TSH Results:  TSH        4/13/2023    10:16   TSH   TSH 1.750             Assessment and plan:  · Witnessed apneas,(R06.81) patient's symptoms and examination is consistent with sleep apnea (G47.30). I have talked to the patient about the signs and symptoms of sleep apnea. In addition, I have also " discussed pathophysiology of sleep apnea.  I also discussed the complications of untreated sleep apnea including effects on hypertension, diabetes mellitus and nonrestorative sleep with hypersomnia which can increase risk for motor vehicle accidents.  Untreated sleep apnea is also a risk factor for development of atrial fibrillation, pulmonary hypertension, and insulin resistance and stroke.  Discussed in detail of various testing methods including home-based and lab based sleep studies.  Based on history and physical examination and other comorbidities the most appropriate study is split-night study.  The order for the sleep study is placed in [facility].  The test will be scheduled after approval from insurance. Treatment and management will be discussed after the test is completed.  Patient was given opportunity to ask questions and all the questions were answered.  Prescription was zolpidem given for sleep study  · Snoring (R06.83), snoring is the sound created by turbulent airflow vibrating upper airway soft tissue due to limitation of inspiratory airflow. I have also discussed factors affecting snoring including sleep deprivation, sleeping on the back and alcohol ingestion. To minimize snoring, patient is advised to have adequate sleep, sleep on the side and avoid alcohol and sedative medications before bedtime  · Daytime excessive sleepiness .  It was assessed with [place] Sleepiness Scale of Total score: 15.  There are many causes for daytime excessive sleepiness including sleep depression, shiftwork syndrome, depression and other medical disorders including heart, kidney and liver failure.  The most serious cause of excessive sleepiness is due to neurological conditions like narcolepsy/cataplexy.  But the most common cause of excessive sleepiness is due to sleep apnea with frequent awakenings during sleep time.  I have discussed safety of driving and to remain vigilant while driving.  · Obesity 1, patient's BMI  is Body mass index is 31.59 kg/m².. I have discussed the relationship between weight and sleep apnea.There is direct correlation between weight and severity of sleep apnea.  Weight reduction is encouraged, as it is going to reduce the severity of sleep apnea. I have also discussed with the patient diet and exercise to achieve ideal body weight.  · Fibromyalgia  · Anxiety  · Hypertension,      I have also discussed with the patient the following  • Sleep hygiene: Maintaining a regular bedtime and wake time, not to watch television or work in bed, limit caffeine-containing beverages before bed time and avoid naps during the day  • Adequate amount of sleep.  Generally most people needs about 7 to 8 hours of sleep.    Return for 31 to 90 days after PAP setup with down load..  Patient's questions were answered      I once again thank you for asking me to see this patient in consultation and I have forwarded my opinion and treatment plan.  Please do not hesitate to call me if you have any questions.   5/1/2023  Kpi Beltran MD  Sleep Medicine  Medical Director  [facility]: [facility]

## 2023-05-21 NOTE — PROGRESS NOTES
"Chief Complaint  Initial Evaluation of the Right Shoulder    Subjective    History of Present Illness      Katia Spears is a 62 y.o. female who presents to [facility] for complaint of Shoulder Pain: Patient complains of right shoulder pain.   The onset of the pain was approximately 10 years ago.    The pain is described as constant.    Location is glenohumeral joint and neck.   Symptoms are aggravated by work at or above shoulder height, difficulty sleeping on affected side.   Symptoms are diminished by steroid injection.         The patient states she has been getting steroid injections, which provided relief for approximately 3 months. She reports the initial onset of pain was approximately 10 years ago. She states she felt it was due to arthritis and just increasingly getting worse. She reports her last MRI she was informed there were some issues; however, not severe enough to warrant surgery. She notes her muscles in her neck are starting to be painful. She adds she attempts to do physical therapy and other things. She notes the pain is keeping her awake at night due to being uncomfortable. She reports if she utilizes the Voltaren and it provides some relief along with ibuprofen. She states she was prescribed diclofenac and Mobic; however, they do not provide relief. She reports pain is constant and decreased range of motion.    The patient communicates she has fibromyalgia and takes muscle relaxers along with gabapentin; however, she is unsure if they help with her shoulder.    She reports she was hospitalized in 10/2022 or 11/2022 and was being \"pumped with steroids\" for severe pneumonia. She reports not having any pain at that time.      Objective   Vital Signs:   Temp 97.2 °F (36.2 °C)   Ht 154.9 cm (61\")   Wt 78.5 kg (173 lb)   BMI 32.69 kg/m²       Physical Exam  Vitals and nursing note reviewed.   Constitutional:       Appearance: Normal appearance.   Pulmonary:      " Effort: Pulmonary effort is normal.   Skin:     General: Skin is warm and dry.      Capillary Refill: Capillary refill takes less than 2 seconds.   Neurological:      General: No focal deficit present.      Mental Status: He is alert and oriented to person, place, and time. Mental status is at baseline.   Psychiatric:         Mood and Affect: Mood normal.         Behavior: Behavior normal.         Thought Content: Thought content normal.         Judgment: Judgment normal.         Ortho Exam   RIGHT shoulder  Active and passive range of motion limited, although passive less than active.    There is obvious crepitus noted during motion testing.    Overall strength/rotator cuff function is mildly impaired.    AC joint is tender upon palpation.    Axillary nerve function is well-preserved.    There is slight winging of the scapula with hollowing of the fossae over the proximal and distal aspects of the spine of the scapula.    Active range of motion as follows: forward flexion is 0-120 degrees, active abduction is 0-100 degrees.    Drop arm sign is negative.    Skin and soft tissues are normal.    Patient's pain level is 7.      Result Review :   Radiologic studies - see below for interpretation  RIGHT shoulder xrays   2 views were performed at Dr. Lin's office\ on 7/29/22. Images were independently viewed and interpreted by myself, my impression as follows:  Moderate glenohumeral arthritis with cystic changes present at the superior aspect of the humeral head              Reviewed MRI report of Right shoulder without contrast, performed at  [facility] on 8/9/2022, summary of impression below:   · Significantly motion degraded exam  · Supraspinatus and infraspinatus tendons grossly intact.  Subscapularis tendon intact.  · Long head biceps tendon grossly intact  · 2 cm fluid distention of long head biceps tendon sheath, consistent with tenosynovitis  · Severe glenohumeral arthritis characterized by  complete cartilage loss and subchondral cystic change/edema  · Nondisplaced degenerative tear posterior half glenoid labrum  · Rotator cuff muscles maintain normal signal and size  · Mild OA of the AC joint          PROCEDURE  Procedures           Assessment   Assessment and Plan    Problem List Items Addressed This Visit        Musculoskeletal and Injuries    Osteoarthritis of glenohumeral joint, right - Primary    Relevant Orders    Case Request (Completed)    Follow Anesthesia Guidelines / Protocol    Obtain informed consent    Provide instructions to patient regarding NPO status    Provide Instructions/Handout For Benzoyl Peroxide 5% Wash If Having Shoulder/Arm Surgery (If Prescribed)    Hemoglobin A1c    Urinalysis With Culture If Indicated -    Care Order / Instruction for all Female Patients: Clean Catch Urinalysis with culture if indicated if patient symptomatic: dysuria, flank or lower abdominal pain, burning, urgency or frequency    ECG 12 Lead    XR Shoulder 2+ View Right       Follow Up   · Discussion of any imaging in detail. Discussion of orthopaedic goals.  · Risk, benefits, and merits of treatment alternatives reviewed with the patient. Treatment alternatives include: oral anti-inflammatories/NSAID, intra-articular steroid injection and surgery  · Ice, heat, and/or modalities as beneficial  · The nature of the proposed surgery reviewed with the patient including risks, benefits, rehabilitation, recovery timeframe, and outcome expectations.  After evaluation in the office, x-rays and history, we have diagnosed rotator cuff arthropathy of the shoulder.  This is a degenerative condition with a chronic tear of the rotator cuff that is not repairable with loss of cartilage in the glenohumeral joint. The only definitive treatment for this condition is total shoulder arthroplasty or joint replacement.  Conservative measures have been used, including cortisone injections, NSAIDS, activity modification and  physical therapy.  This will require 3-4 months of time for recovery. It starts with 4-6 weeks in a sling, followed by rehabilitation at physical therapy and exercises to be done at home.  With this type of implant, you will need to take oral antibiotics prior to all dental visits and for some procedures (for example: colonoscopy, skin lesion removal, etc).  This will be a single dose one hour prior to the procedure.    Risks and benefits of surgery were discussed with the patient in detail.  Risks include but are not limited to infection, myocardial infarction, stroke, DVT, pulmonary embolism, death, dislocation, neurovascular compromise, limb length discrepancy, revision surgery, limited range of motion, wound healing problems and scar tissue build-up.  Patient understands and agrees to proceed with surgery.  · Surgery: Surgery proposed at this visit as noted.  · Patient is encouraged to call or return for any issues or concerns.  · No brace was given at today's visit.  • Patient was given instructions and counseling regarding her condition or for health maintenance advice. Please see specific information pulled into the AVS if appropriate.     Abdias Ramachandran PA-C    Date of Encounter: 5/22/2023     Transcribed from ambient dictation for Abdias Ramachandran PA-C by Jacqui Arthur.  05/22/23   13:24 EDT    Patient or patient representative verbalized consent to the visit recording.  I have personally performed the services described in this document as transcribed by the above individual, and it is both accurate and complete.  Abdias Ramachandran PA-C  5/23/2023  08:20 EDT

## 2023-05-22 ENCOUNTER — OFFICE VISIT (OUTPATIENT)
Dept: ORTHOPEDIC SURGERY | Facility: CLINIC | Age: 63
End: 2023-05-22
Payer: OTHER GOVERNMENT

## 2023-05-22 VITALS — BODY MASS INDEX: 32.66 KG/M2 | WEIGHT: 173 LBS | HEIGHT: 61 IN | TEMPERATURE: 97.2 F

## 2023-05-22 DIAGNOSIS — M19.011 OSTEOARTHRITIS OF GLENOHUMERAL JOINT, RIGHT: Primary | ICD-10-CM

## 2023-05-22 PROCEDURE — 99204 OFFICE O/P NEW MOD 45 MIN: CPT | Performed by: PHYSICIAN ASSISTANT

## 2023-05-22 RX ORDER — PREDNISONE 20 MG/1
20 TABLET ORAL SEE ADMIN INSTRUCTIONS
Qty: 15 TABLET | Refills: 0 | Status: SHIPPED | OUTPATIENT
Start: 2023-05-22

## 2023-05-23 ENCOUNTER — TELEPHONE (OUTPATIENT)
Dept: ORTHOPEDIC SURGERY | Facility: CLINIC | Age: 63
End: 2023-05-23
Payer: OTHER GOVERNMENT

## 2023-05-23 PROBLEM — M19.011 OSTEOARTHRITIS OF GLENOHUMERAL JOINT, RIGHT: Status: ACTIVE | Noted: 2023-05-23

## 2023-05-23 RX ORDER — MELOXICAM 7.5 MG/1
15 TABLET ORAL ONCE
OUTPATIENT
Start: 2023-05-23 | End: 2023-05-23

## 2023-05-23 RX ORDER — PREGABALIN 150 MG/1
150 CAPSULE ORAL ONCE
OUTPATIENT
Start: 2023-05-23 | End: 2023-05-23

## 2023-05-23 RX ORDER — ACETAMINOPHEN 325 MG/1
1000 TABLET ORAL ONCE
OUTPATIENT
Start: 2023-05-23 | End: 2023-05-23

## 2023-05-24 ENCOUNTER — PATIENT ROUNDING (BHMG ONLY) (OUTPATIENT)
Dept: ORTHOPEDIC SURGERY | Facility: CLINIC | Age: 63
End: 2023-05-24
Payer: OTHER GOVERNMENT

## 2023-05-24 ENCOUNTER — LAB (OUTPATIENT)
Dept: LAB | Facility: HOSPITAL | Age: 63
End: 2023-05-24
Payer: OTHER GOVERNMENT

## 2023-05-24 ENCOUNTER — OFFICE VISIT (OUTPATIENT)
Dept: FAMILY MEDICINE CLINIC | Age: 63
End: 2023-05-24
Payer: OTHER GOVERNMENT

## 2023-05-24 VITALS
HEART RATE: 96 BPM | TEMPERATURE: 98.1 F | HEIGHT: 61 IN | WEIGHT: 173 LBS | BODY MASS INDEX: 32.66 KG/M2 | SYSTOLIC BLOOD PRESSURE: 128 MMHG | OXYGEN SATURATION: 98 % | DIASTOLIC BLOOD PRESSURE: 65 MMHG

## 2023-05-24 DIAGNOSIS — R10.84 GENERALIZED ABDOMINAL PAIN: Primary | ICD-10-CM

## 2023-05-24 DIAGNOSIS — M19.011 OSTEOARTHRITIS OF GLENOHUMERAL JOINT, RIGHT: ICD-10-CM

## 2023-05-24 DIAGNOSIS — R10.84 GENERALIZED ABDOMINAL PAIN: ICD-10-CM

## 2023-05-24 LAB
BILIRUB UR QL STRIP: NEGATIVE
CLARITY UR: CLEAR
COLOR UR: YELLOW
GLUCOSE UR STRIP-MCNC: NEGATIVE MG/DL
HBA1C MFR BLD: 5.4 % (ref 4.8–5.6)
HGB UR QL STRIP.AUTO: NEGATIVE
KETONES UR QL STRIP: NEGATIVE
LEUKOCYTE ESTERASE UR QL STRIP.AUTO: NEGATIVE
NITRITE UR QL STRIP: NEGATIVE
PH UR STRIP.AUTO: <=5 [PH] (ref 5–8)
PROT UR QL STRIP: NEGATIVE
SP GR UR STRIP: 1.01 (ref 1–1.03)
UROBILINOGEN UR QL STRIP: NORMAL

## 2023-05-24 PROCEDURE — 99213 OFFICE O/P EST LOW 20 MIN: CPT | Performed by: NURSE PRACTITIONER

## 2023-05-24 PROCEDURE — 83036 HEMOGLOBIN GLYCOSYLATED A1C: CPT

## 2023-05-24 PROCEDURE — 81003 URINALYSIS AUTO W/O SCOPE: CPT

## 2023-05-24 PROCEDURE — 36415 COLL VENOUS BLD VENIPUNCTURE: CPT

## 2023-05-24 NOTE — PROGRESS NOTES
May 24, 2023        A [employer] Message has been sent to the patient for PATIENT ROUNDING with [facility]

## 2023-05-24 NOTE — PROGRESS NOTES
"Chief Complaint  Abdominal Pain (Right upper quadrant pain, swelling, and bloating. Started in January but the last week or 2 symptoms have worsen.  )    Subjective          Katia Spears presents to [facility]     Patient is a 62-year-old female who reports a vague abdominal fullness of the entire right side of the abdomen without nausea, vomiting, diarrhea, constipation, heartburn, or acid reflux.  Symptoms started around January but have been a little more noticeable recently.  She denies fever or pain on urination.  History of cholecystectomy.  Also feels a little bloated but does not suspect any food sensitivities.     Objective   Vital Signs:   Vitals:    05/24/23 1430   BP: 128/65   BP Location: Left arm   Patient Position: Sitting   Cuff Size: Large Adult   Pulse: 96   Temp: 98.1 °F (36.7 °C)   TempSrc: Oral   SpO2: 98%   Weight: 78.5 kg (173 lb)   Height: 154.9 cm (61\")      Body mass index is 32.69 kg/m².  Physical Exam  Vitals reviewed.   Constitutional:       General: She is not in acute distress.     Appearance: Normal appearance. She is well-developed. She is obese.   Cardiovascular:      Rate and Rhythm: Normal rate and regular rhythm.      Heart sounds: Normal heart sounds.   Pulmonary:      Effort: Pulmonary effort is normal.      Breath sounds: Normal breath sounds.   Abdominal:      General: Abdomen is flat. Bowel sounds are normal. There is no distension.      Palpations: Abdomen is soft. There is no mass.      Tenderness: There is no guarding or rebound.   Musculoskeletal:      Right lower leg: No edema.      Left lower leg: No edema.   Skin:     General: Skin is warm and dry.   Neurological:      General: No focal deficit present.      Mental Status: She is alert.   Psychiatric:         Attention and Perception: Attention normal.         Mood and Affect: Mood and affect normal.         Behavior: Behavior normal.           Current Outpatient Medications:   •  " albuterol sulfate  (90 Base) MCG/ACT inhaler, Inhale 1 puff Every 6 (Six) Hours As Needed., Disp: , Rfl:   •  baclofen (LIORESAL) 10 MG tablet, Take 1 tablet by mouth 3 (Three) Times a Day., Disp: 270 tablet, Rfl: 1  •  cetirizine (zyrTEC) 10 MG tablet, Take 1 tablet by mouth Every Night., Disp: , Rfl:   •  DULoxetine (CYMBALTA) 60 MG capsule, Take 1 capsule by mouth Every Night., Disp: 90 capsule, Rfl: 1  •  estradiol (ESTRACE) 0.1 MG/GM vaginal cream, , Disp: , Rfl:   •  fluticasone (FLONASE) 50 MCG/ACT nasal spray, 1 spray into the nostril(s) as directed by provider As Needed., Disp: , Rfl:   •  gabapentin (NEURONTIN) 300 MG capsule, Take 1 capsule by mouth 3 (Three) Times a Day As Needed (fibromyalgia)., Disp: 270 capsule, Rfl: 1  •  hydrOXYzine (ATARAX) 25 MG tablet, Take 1 tablet by mouth 3 (Three) Times a Day As Needed for Anxiety (insomnia)., Disp: 30 tablet, Rfl: 2  •  linaclotide (Linzess) 145 MCG capsule capsule, Take 1 capsule by mouth Every Morning Before Breakfast., Disp: 90 capsule, Rfl: 1  •  lisinopril-hydrochlorothiazide (PRINZIDE,ZESTORETIC) 10-12.5 MG per tablet, Take 1 tablet by mouth Daily., Disp: 90 tablet, Rfl: 1  •  montelukast (SINGULAIR) 10 MG tablet, Take 1 tablet by mouth Every Night., Disp: 90 tablet, Rfl: 1  •  pantoprazole (PROTONIX) 20 MG EC tablet, Take 1 tablet by mouth Daily., Disp: 90 tablet, Rfl: 0  •  simvastatin (ZOCOR) 20 MG tablet, Take 1 tablet by mouth Every Night., Disp: 90 tablet, Rfl: 1  •  tretinoin (RETIN-A) 0.05 % cream, APPLY A THIN LAYER EXTERNALLY TO THE AFFECTED AREA OF FACE EVERY NIGHT AT BEDTIME AS TOLERATED, Disp: , Rfl:   •  Advair -21 MCG/ACT inhaler, Inhale 2 puffs 2 (Two) Times a Day. (Patient not taking: Reported on 5/24/2023), Disp: , Rfl:   •  predniSONE (DELTASONE) 20 MG tablet, Take 1 tablet by mouth See Admin Instructions. TAKE 1 TABLET TWICE DAILY FOR 5 DAYS AND THEN 1 TABLET DAILY FOR 5 DAYS (Patient not taking: Reported on  5/24/2023), Disp: 15 tablet, Rfl: 0  •  zolpidem (AMBIEN) 5 MG tablet, Take 1 tablet by mouth Take As Directed for 2 doses. Bring the medication to the sleep lab. DO NOT USE AT HOME (Patient not taking: Reported on 5/24/2023), Disp: 2 tablet, Rfl: 0   Past Medical History:   Diagnosis Date   • Anemia, unspecified    • Anxiety    • Arthritis    • Arthritis of back 2022   • Asthma    • Chronic obstructive pulmonary disease, unspecified    • Chronic sinusitis, unspecified    • Deviated nasal septum    • Dysthymic disorder    • Essential (primary) hypertension    • Fibromyalgia    • Fracture of wrist 2018   • Frozen shoulder    • Generalized anxiety disorder    • GERD (gastroesophageal reflux disease)    • Hypo-osmolality and hyponatremia    • Knee swelling    • Mixed hyperlipidemia    • Obstructive sleep apnea (adult) (pediatric)    • Osteopenia    • Osteoporosis    • Other difficulties with micturition    • Other long term (current) drug therapy    • Periarthritis of shoulder 2020   • Pneumonia 12/2022   • RLS (restless legs syndrome)    • Rotator cuff syndrome 2020   • Scoliosis 2000   • Tear of meniscus of knee 2010   • Vitamin D deficiency, unspecified          Result Review :     CMP        10/21/2022    10:37 4/13/2023    10:16   CMP   Glucose 87   87     BUN 11   10     Creatinine 1.05   0.95     EGFR 59.5      Sodium 137   140     Potassium 4.4   4.6     Chloride 100   100     Calcium 10.1   10.0     Total Protein  6.7     Total Protein 6.5      Albumin 4.00   4.4     Globulin  2.3     Globulin 2.5      Total Bilirubin 0.2   0.3     Alkaline Phosphatase 93   104     AST (SGOT) 19   22     ALT (SGPT) 22   19     Albumin/Globulin Ratio 1.6      BUN/Creatinine Ratio 10.5   11     Anion Gap 8.3        CBC        4/13/2023    10:16   CBC   WBC 4.9     RBC 4.43     Hemoglobin 13.3     Hematocrit 39.4     MCV 89     MCH 30.0     MCHC 33.8     RDW 12.0     Platelets 385       CBC w/diff        4/13/2023    10:16   CBC  w/Diff   WBC 4.9     RBC 4.43     Hemoglobin 13.3     Hematocrit 39.4     MCV 89     MCH 30.0     MCHC 33.8     RDW 12.0     Platelets 385     Neutrophil Rel % 52     Lymphocyte Rel % 37     Monocyte Rel % 7     Eosinophil Rel % 3     Basophil Rel % 1       Lipid Panel        10/21/2022    10:37 4/13/2023    10:16   Lipid Panel   Total Cholesterol 187      Total Cholesterol  207     Triglycerides 68   128     HDL Cholesterol 78   69     VLDL Cholesterol 13   22     LDL Cholesterol  96   116     LDL/HDL Ratio 1.22        TSH        4/13/2023    10:16   TSH   TSH 1.750       Most Recent A1C        5/24/2023    15:27   HGBA1C Most Recent   Hemoglobin A1C 5.40                Assessment and Plan    Diagnoses and all orders for this visit:    1. Generalized abdominal pain (Primary)  Assessment & Plan:  Generalized entire right side discomfort intermittently in different areas.  Denies heartburn or acid reflux.  H. pylori breath test negative 2 years ago.  Colonoscopy August 2022 noted 6 polyps, diverticulosis, and internal hemorrhoids.  Small hiatal hernia was noted on EGD.  Patient states symptoms are stable on Protonix 20 mg daily.  Reviewed labs done in April.  Discussed how CBC her white blood cell count may have changed.  We have not recently checked pancreatic enzymes.  Patient defers labs at this time.  We will proceed with urinalysis and complete abdominal ultrasound so that all affected areas may be viewed.  Further treatment pending lab results.  Discussed possible food sensitivities.  Patient not concerned about food sensitivities at this time.    Orders:  -     Urinalysis With Culture If Indicated -; Future  -     US Abdomen Complete; Future      Follow Up    No follow-ups on file.  Patient was given instructions and counseling regarding her condition or for health maintenance advice. Please see specific information pulled into the AVS if appropriate.

## 2023-05-25 ENCOUNTER — HOSPITAL ENCOUNTER (OUTPATIENT)
Dept: ULTRASOUND IMAGING | Facility: HOSPITAL | Age: 63
Discharge: HOME | End: 2023-05-25
Admitting: NURSE PRACTITIONER
Payer: OTHER GOVERNMENT

## 2023-05-25 DIAGNOSIS — R10.84 GENERALIZED ABDOMINAL PAIN: ICD-10-CM

## 2023-05-25 PROCEDURE — 76700 US EXAM ABDOM COMPLETE: CPT

## 2023-05-25 NOTE — ASSESSMENT & PLAN NOTE
Generalized entire right side discomfort intermittently in different areas.  Denies heartburn or acid reflux.  H. pylori breath test negative 2 years ago.  Colonoscopy August 2022 noted 6 polyps, diverticulosis, and internal hemorrhoids.  Small hiatal hernia was noted on EGD.  Patient states symptoms are stable on Protonix 20 mg daily.  Reviewed labs done in April.  Discussed how CBC her white blood cell count may have changed.  We have not recently checked pancreatic enzymes.  Patient defers labs at this time.  We will proceed with urinalysis and complete abdominal ultrasound so that all affected areas may be viewed.  Further treatment pending lab results.  Discussed possible food sensitivities.  Patient not concerned about food sensitivities at this time.

## 2023-05-30 DIAGNOSIS — Q89.09 ANOMALY OF SPLEEN: Primary | ICD-10-CM

## 2023-06-05 DIAGNOSIS — D49.0 SPLEEN NEOPLASM: ICD-10-CM

## 2023-06-05 DIAGNOSIS — R10.84 GENERALIZED ABDOMINAL PAIN: Primary | ICD-10-CM

## 2023-06-06 ENCOUNTER — HOSPITAL ENCOUNTER (OUTPATIENT)
Dept: CT IMAGING | Facility: HOSPITAL | Age: 63
Discharge: HOME | End: 2023-06-06
Admitting: NURSE PRACTITIONER
Payer: OTHER GOVERNMENT

## 2023-06-06 DIAGNOSIS — R10.84 GENERALIZED ABDOMINAL PAIN: ICD-10-CM

## 2023-06-06 DIAGNOSIS — D49.0 SPLEEN NEOPLASM: ICD-10-CM

## 2023-06-06 LAB
CREAT BLDA-MCNC: 1 MG/DL
EGFRCR SERPLBLD CKD-EPI 2021: 63 ML/MIN/1.73

## 2023-06-06 PROCEDURE — 82565 ASSAY OF CREATININE: CPT

## 2023-06-06 PROCEDURE — 25510000001 IOPAMIDOL PER 1 ML: Performed by: NURSE PRACTITIONER

## 2023-06-06 PROCEDURE — 74177 CT ABD & PELVIS W/CONTRAST: CPT

## 2023-06-06 RX ADMIN — IOPAMIDOL 100 ML: 755 INJECTION, SOLUTION INTRAVENOUS at 16:17

## 2023-06-07 ENCOUNTER — HOSPITAL ENCOUNTER (OUTPATIENT)
Dept: SLEEP MEDICINE | Facility: HOSPITAL | Age: 63
Discharge: HOME | End: 2023-06-07
Admitting: INTERNAL MEDICINE
Payer: OTHER GOVERNMENT

## 2023-06-07 DIAGNOSIS — R53.83 OTHER FATIGUE: ICD-10-CM

## 2023-06-07 DIAGNOSIS — E66.9 CLASS 1 OBESITY: ICD-10-CM

## 2023-06-07 DIAGNOSIS — R06.83 SNORING: ICD-10-CM

## 2023-06-07 DIAGNOSIS — G47.8 NON-RESTORATIVE SLEEP: ICD-10-CM

## 2023-06-07 DIAGNOSIS — G47.30 OBSERVED SLEEP APNEA: ICD-10-CM

## 2023-06-07 DIAGNOSIS — G47.10 HYPERSOMNIA: ICD-10-CM

## 2023-06-07 PROCEDURE — 95810 POLYSOM 6/> YRS 4/> PARAM: CPT | Performed by: INTERNAL MEDICINE

## 2023-06-07 PROCEDURE — 95810 POLYSOM 6/> YRS 4/> PARAM: CPT

## 2023-06-08 NOTE — PROGRESS NOTES
Please call patient .  This abdominal ct scan shows no acute concerns for cause of right sided abdominal pain and bloating.  Granuloma left lung. Cyst right lobe of liver.  This does not provide any further information about previously identified calcified granulomas of the spleen.  Further testing is necessary to further evaluate that finding.  CBC, CMP, ACE level and TB testing would be recommended in addition to ct of chest with contrast to further evaluate and compare to ct of chest March 2023 that was done without contrast.  I would also recommend checking amylase and lipase (pancreatic enzymes) to further investigate abdominal pain.  Another option is to refer you to a pulmonologist for further evaluation pulmonary testing and evaluation.  Follow up appointment here to discuss further concerns.

## 2023-06-09 ENCOUNTER — TELEPHONE (OUTPATIENT)
Dept: ORTHOPEDIC SURGERY | Facility: CLINIC | Age: 63
End: 2023-06-09
Payer: OTHER GOVERNMENT

## 2023-06-09 ENCOUNTER — TELEPHONE (OUTPATIENT)
Dept: FAMILY MEDICINE CLINIC | Age: 63
End: 2023-06-09
Payer: OTHER GOVERNMENT

## 2023-06-09 NOTE — TELEPHONE ENCOUNTER
Caller: GERRI     Relationship to patient: SELF    Best call back number: [phone]    Type of visit: RIGHT SHOULDER INJECTION     ASKING TO SCHEDULE AND LEAVE VOICEMAIL WITH APPT TIME

## 2023-06-09 NOTE — TELEPHONE ENCOUNTER
1st attempt - spoke with pt about overdue results and she stated she would be in here on Monday to complete these

## 2023-06-12 ENCOUNTER — TELEPHONE (OUTPATIENT)
Dept: FAMILY MEDICINE CLINIC | Age: 63
End: 2023-06-12
Payer: OTHER GOVERNMENT

## 2023-06-12 ENCOUNTER — LAB (OUTPATIENT)
Dept: LAB | Facility: HOSPITAL | Age: 63
End: 2023-06-12
Payer: OTHER GOVERNMENT

## 2023-06-12 DIAGNOSIS — R10.84 GENERALIZED ABDOMINAL PAIN: Primary | ICD-10-CM

## 2023-06-12 DIAGNOSIS — Q89.09 ANOMALY OF SPLEEN: ICD-10-CM

## 2023-06-12 DIAGNOSIS — R10.84 GENERALIZED ABDOMINAL PAIN: ICD-10-CM

## 2023-06-12 LAB
AMYLASE SERPL-CCNC: 56 U/L (ref 28–100)
BASOPHILS # BLD AUTO: 0.04 10*3/MM3 (ref 0–0.2)
BASOPHILS NFR BLD AUTO: 0.8 % (ref 0–1.5)
DEPRECATED RDW RBC AUTO: 42.6 FL (ref 37–54)
EOSINOPHIL # BLD AUTO: 0.15 10*3/MM3 (ref 0–0.4)
EOSINOPHIL NFR BLD AUTO: 2.9 % (ref 0.3–6.2)
ERYTHROCYTE [DISTWIDTH] IN BLOOD BY AUTOMATED COUNT: 13 % (ref 12.3–15.4)
ERYTHROCYTE [SEDIMENTATION RATE] IN BLOOD: 3 MM/HR (ref 0–30)
HCT VFR BLD AUTO: 36.7 % (ref 34–46.6)
HGB BLD-MCNC: 12.1 G/DL (ref 12–15.9)
IMM GRANULOCYTES # BLD AUTO: 0.02 10*3/MM3 (ref 0–0.05)
IMM GRANULOCYTES NFR BLD AUTO: 0.4 % (ref 0–0.5)
LIPASE SERPL-CCNC: 40 U/L (ref 13–60)
LYMPHOCYTES # BLD AUTO: 1.59 10*3/MM3 (ref 0.7–3.1)
LYMPHOCYTES NFR BLD AUTO: 31.1 % (ref 19.6–45.3)
MCH RBC QN AUTO: 29 PG (ref 26.6–33)
MCHC RBC AUTO-ENTMCNC: 33 G/DL (ref 31.5–35.7)
MCV RBC AUTO: 88 FL (ref 79–97)
MONOCYTES # BLD AUTO: 0.5 10*3/MM3 (ref 0.1–0.9)
MONOCYTES NFR BLD AUTO: 9.8 % (ref 5–12)
NEUTROPHILS NFR BLD AUTO: 2.81 10*3/MM3 (ref 1.7–7)
NEUTROPHILS NFR BLD AUTO: 55 % (ref 42.7–76)
PLATELET # BLD AUTO: 344 10*3/MM3 (ref 140–450)
PMV BLD AUTO: 9.6 FL (ref 6–12)
RBC # BLD AUTO: 4.17 10*6/MM3 (ref 3.77–5.28)
WBC NRBC COR # BLD: 5.11 10*3/MM3 (ref 3.4–10.8)

## 2023-06-12 PROCEDURE — 83690 ASSAY OF LIPASE: CPT

## 2023-06-12 PROCEDURE — 85025 COMPLETE CBC W/AUTO DIFF WBC: CPT

## 2023-06-12 PROCEDURE — 36415 COLL VENOUS BLD VENIPUNCTURE: CPT

## 2023-06-12 PROCEDURE — 85652 RBC SED RATE AUTOMATED: CPT

## 2023-06-12 PROCEDURE — 82150 ASSAY OF AMYLASE: CPT

## 2023-06-12 PROCEDURE — 82164 ANGIOTENSIN I ENZYME TEST: CPT

## 2023-06-12 NOTE — TELEPHONE ENCOUNTER
Per note on CT, lab had originally said they could not add on Amylase and Lipase, but Tr called back and says they can, so add on orders have been sent.

## 2023-06-14 DIAGNOSIS — R06.83 SNORING: ICD-10-CM

## 2023-06-14 DIAGNOSIS — G47.33 OSA (OBSTRUCTIVE SLEEP APNEA): Primary | ICD-10-CM

## 2023-06-14 DIAGNOSIS — R93.89 ABNORMAL CT SCAN, CHEST: Primary | ICD-10-CM

## 2023-06-14 LAB — ACE SERPL-CCNC: 19 U/L (ref 14–82)

## 2023-06-14 NOTE — PROGRESS NOTES
Normal pancreatic enzymes and sedimentation rate which means no current inflammation blood count is completely normal.  Angiotensin-converting enzyme test is pending.  CT scan of your chest with contrast has been ordered

## 2023-06-15 ENCOUNTER — TELEPHONE (OUTPATIENT)
Dept: SLEEP MEDICINE | Facility: HOSPITAL | Age: 63
End: 2023-06-15
Payer: OTHER GOVERNMENT

## 2023-06-16 ENCOUNTER — CLINICAL SUPPORT (OUTPATIENT)
Dept: ORTHOPEDIC SURGERY | Facility: CLINIC | Age: 63
End: 2023-06-16
Payer: OTHER GOVERNMENT

## 2023-06-16 VITALS — WEIGHT: 170 LBS | BODY MASS INDEX: 31.28 KG/M2 | HEIGHT: 62 IN

## 2023-06-16 DIAGNOSIS — M19.011 OSTEOARTHRITIS OF GLENOHUMERAL JOINT, RIGHT: Primary | ICD-10-CM

## 2023-06-16 RX ORDER — METHYLPREDNISOLONE ACETATE 80 MG/ML
160 INJECTION, SUSPENSION INTRA-ARTICULAR; INTRALESIONAL; INTRAMUSCULAR; SOFT TISSUE
Status: COMPLETED | OUTPATIENT
Start: 2023-06-16 | End: 2023-06-16

## 2023-06-16 RX ORDER — LIDOCAINE HYDROCHLORIDE 10 MG/ML
2 INJECTION, SOLUTION INFILTRATION; PERINEURAL
Status: COMPLETED | OUTPATIENT
Start: 2023-06-16 | End: 2023-06-16

## 2023-06-16 RX ADMIN — METHYLPREDNISOLONE ACETATE 160 MG: 80 INJECTION, SUSPENSION INTRA-ARTICULAR; INTRALESIONAL; INTRAMUSCULAR; SOFT TISSUE at 10:20

## 2023-06-16 RX ADMIN — LIDOCAINE HYDROCHLORIDE 2 ML: 10 INJECTION, SOLUTION INFILTRATION; PERINEURAL at 10:20

## 2023-06-16 NOTE — PROGRESS NOTES
"Chief Complaint  Follow-up of the Right Shoulder    Subjective    History of Present Illness      Katia Spears is a 62 y.o. female who presents to [facility] for injection therapy. She is receiving first time  RIGHT shoulder injections of Depo-Medrol. She is scheduled to have a shoulder replacement in October. I did prescribe a steroid taper dose, which did help her pain for a few days. Treatment options have been discussed and she understands and consents.       Objective   Vital Signs:   Ht 157.5 cm (62\")   Wt 77.1 kg (170 lb)   BMI 31.09 kg/m²     Physical Exam  62 y.o. female is awake, alert, oriented, in no acute distress and well developed, well nourished.  Ortho Exam   RIGHT shoulder  Active and passive range of motion limited, although passive less than active.    There is obvious crepitus noted during motion testing.    Overall strength/rotator cuff function is mildly impaired.    AC joint is tender upon palpation.    Axillary nerve function is well-preserved.    There is slight winging of the scapula with hollowing of the fossae over the proximal and distal aspects of the spine of the scapula.    Active range of motion as follows: forward flexion is 0-120 degrees, active abduction is 0-100 degrees.    Drop arm sign is negative.    Skin and soft tissues are normal.       Result Review :          PROCEDURE  Large Joint Arthrocentesis: R glenohumeral  Date/Time: 6/16/2023 10:20 AM  Consent given by: patient  Site marked: site marked  Timeout: Immediately prior to procedure a time out was called to verify the correct patient, procedure, equipment, support staff and site/side marked as required   Supporting Documentation  Indications: pain   Procedure Details  Location: shoulder - R glenohumeral  Preparation: Patient was prepped and draped in the usual sterile fashion  Needle size: 25 G  Approach: posterior  Medications administered: 2 mL lidocaine 1 %; 160 mg methylPREDNISolone " acetate 80 MG/ML  Patient tolerance: patient tolerated the procedure well with no immediate complications           Injection site was identified by physical examination and cleaned with Betadine and alcohol swabs. Prior to needle insertion, ethyl chloride spray was used for surface anesthesia. Sterile technique was used.       Assessment   Assessment and Plan    Problem List Items Addressed This Visit          Musculoskeletal and Injuries    Osteoarthritis of glenohumeral joint, right - Primary    Relevant Orders    Large Joint Arthrocentesis: R glenohumeral       Follow Up   Right shoulder Depo-Medrol injection was discussed with the patient. Discussed indication, risks, benefits, and alternatives. Verbal consent was given to proceed with the procedure.   Injection was performed from posterior approach.  Patient tolerated the procedure well and no complications were encountered.  Discussion of orthopedic goals and activities and patient/guardian expressed understanding.  Ice, heat, rest, compression and elevation of extremity as beneficial  nsaids and/or tylenol as beneficial  Instructed to refrain from heavy activity/rest the extremity for the next 24-48 hours  Discussion regarding possibility of cortisol flare and what to expect if this occurs  Watch for signs and symptoms of infection  Call if adverse effect from injection therapy  Follow up in 3 months  Patient was given instructions and counseling regarding her condition or for health maintenance advice. Please see specific information pulled into the AVS if appropriate.     Abdias Ramachandran PA-C   Date of Encounter: 6/16/2023     Electronically signed by Abdias Ramachandran PA-C, 06/16/23, 10:54 AM EDT.   EMR Dragon/Transcription disclaimer:  Much of this encounter note is an electronic transcription/translation of spoken language to printed text. The electronic translation of spoken language may permit erroneous, or at times, nonsensical words or  phrases to be inadvertently transcribed; Although I have reviewed the note for such errors, some may still exist.

## 2023-06-20 NOTE — PROGRESS NOTES
Please call and check patient status of abdominal pain.  This negative ACE level helps rule out sarcoidosis.  This test is positive in 75% of persons with sarcoidosis.

## 2023-06-21 PROBLEM — G47.10 HYPERSOMNIA: Status: RESOLVED | Noted: 2023-05-01 | Resolved: 2023-06-21

## 2023-06-21 PROBLEM — G47.8 NON-RESTORATIVE SLEEP: Status: RESOLVED | Noted: 2023-05-01 | Resolved: 2023-06-21

## 2023-06-21 PROBLEM — R06.83 SNORING: Status: RESOLVED | Noted: 2023-05-01 | Resolved: 2023-06-21

## 2023-06-21 PROBLEM — G47.61 PLMD (PERIODIC LIMB MOVEMENT DISORDER): Status: ACTIVE | Noted: 2023-06-21

## 2023-08-03 ENCOUNTER — TELEPHONE (OUTPATIENT)
Dept: FAMILY MEDICINE CLINIC | Age: 63
End: 2023-08-03
Payer: MEDICARE

## 2023-08-30 DIAGNOSIS — K59.04 CHRONIC IDIOPATHIC CONSTIPATION: ICD-10-CM

## 2023-09-11 ENCOUNTER — OFFICE VISIT (OUTPATIENT)
Dept: SLEEP MEDICINE | Facility: HOSPITAL | Age: 63
End: 2023-09-11
Payer: MEDICARE

## 2023-09-11 VITALS
DIASTOLIC BLOOD PRESSURE: 72 MMHG | WEIGHT: 179.8 LBS | OXYGEN SATURATION: 98 % | HEART RATE: 96 BPM | SYSTOLIC BLOOD PRESSURE: 132 MMHG | HEIGHT: 62 IN | BODY MASS INDEX: 33.09 KG/M2

## 2023-09-11 DIAGNOSIS — G47.61 PLMD (PERIODIC LIMB MOVEMENT DISORDER): ICD-10-CM

## 2023-09-11 PROCEDURE — G0463 HOSPITAL OUTPT CLINIC VISIT: HCPCS

## 2023-09-11 RX ORDER — ALENDRONATE SODIUM 70 MG/1
TABLET ORAL
COMMUNITY
Start: 2023-09-01

## 2023-09-11 RX ORDER — CLONAZEPAM 1 MG/1
2 TABLET ORAL NIGHTLY
Qty: 60 TABLET | Refills: 5 | Status: SHIPPED | OUTPATIENT
Start: 2023-09-11

## 2023-09-11 NOTE — PROGRESS NOTES
"  [address] [address]  Phone: [phone]  Fax: [phone]      SLEEP CLINIC FOLLOW UP PROGRESS NOTE.    Katia Spears  [id number]   1960  63 y.o.  female      PCP: Esha Galeana APRN      Date of visit: 9/11/2023    Chief Complaint   Patient presents with    Sleep Apnea    Leg/body Jerks During Sleep       HPI:  This is a 63 y.o. years old patient is here for the management of obstructive sleep apnea.  Sleep apnea is mild in severity with a AHI of 9/hr.Patient is using positive airway pressure therapy and the symptoms of sleep apnea have improved significantly on auto CPAP. Normally patient goes to bed at 1130 PM and wakes up at 8 AM .  The patient wakes up 3 time(s) during the night and has no problem going back to sleep.       Patient also has severe PLMD.  She had a polysomnography which showed PLMS index of 228/h with arousals of 142/h related to PLMS.  Her sleep is unrefreshed    Medications and allergies are reviewed by me and documented in the encounter.     SOCIAL ( habits pertaining to sleep medicine)  History of tobacco use:No   History of alcohol use: 1 per week  Caffeine use: 2    REVIEW OF SYSTEMS:   Pertaining positive symptoms:  Endeavor Sleepiness Scale :Total score: 15   Dry mouth      PHYSICAL EXAMINATION:  CONSTITUTIONAL:  Vitals:    09/11/23 1300   BP: 132/72   Pulse: 96   SpO2: 98%   Weight: 81.6 kg (179 lb 12.8 oz)   Height: 157.5 cm (62.01\")    Body mass index is 32.88 kg/m².   NOSE: nasal passages are clear, No deformities noted   RESP SYSTEM: Not in any respiratory distress, no chest deformities noted,   CARDIOVASULAR: No edema noted  NEURO: Oriented x 3, gait normal,  Mood and affect appeared appropriate      Data reviewed:  The Smart card downloaded on 9/11/2023 has been reviewed independently by me for compliance and discussed the data with the patient.   Compliance; 100%  More than 4 hr use, 90%  Average use of the " device 5 hours and 49 per night  Residual AHI: 1.5 /hr (goal < 5.0 /hr)  Mask type: Fullface mask  Device: ResMed  DME: [facility] checked    ASSESSMENT AND PLAN:  Obstructive sleep apnea ( G 47.33).  The symptoms of sleep apnea have improved with the device and the treatment.  Patient's compliance with the device is excellent for treatment of sleep apnea.  I have independently reviewed the smart card down load and discussed with the patient the download data and encouarged the patient to continue to use the device.The residual AHI is acceptable. The device is benefiting the patient and the device is medically necessary.  Without proper control of sleep apnea and good compliance there is a increased risk for hypertension, diabetes mellitus and nonrestorative sleep with hypersomnia which can increase risk for motor vehicle accidents.  Untreated sleep apnea is also a risk factor for development of atrial fibrillation, pulmonary hypertension, insulin resistance and stroke. The patient is also instructed to get the supplies from the [employer] and and change them on a regular basis.  A prescription for supplies has been sent to the [employer].  I have also discussed the good sleep hygiene habits and adequate amount of sleep needed for good health.  Severe PLMD, she is on clonazepam 1 mg, she says it has somewhat helped her but still feels restless.  I am going to increase her clonazepam to 2 mg at bedtime sent a prescription for 60 tablets with 5 refills.  I will see her in about 6 months for follow-up  Obesity 1 with BMI is Body mass index is 32.88 kg/m².. I have discuss the relationship between the weight and sleep apnea. The benefit of weight loss in reducing severity of sleep apnea was discussed. Discussed diet and exercise with the patient to achieve ideal BMI.  Return in about 6 months (around 3/11/2024) for with smart card down load. . Patient's questions were answered.      Kip Beltran,  MD  Sleep Medicine  Medical Director,   [facility]  9/11/2023

## 2023-10-04 DIAGNOSIS — E78.2 MIXED HYPERLIPIDEMIA: ICD-10-CM

## 2023-10-04 DIAGNOSIS — M79.7 FIBROMYALGIA: ICD-10-CM

## 2023-10-04 DIAGNOSIS — F41.9 ANXIETY: ICD-10-CM

## 2023-10-04 DIAGNOSIS — I10 PRIMARY HYPERTENSION: ICD-10-CM

## 2023-10-04 DIAGNOSIS — Z91.09 ENVIRONMENTAL ALLERGIES: ICD-10-CM

## 2023-10-04 RX ORDER — SIMVASTATIN 20 MG
20 TABLET ORAL NIGHTLY
Qty: 90 TABLET | Refills: 1 | Status: SHIPPED | OUTPATIENT
Start: 2023-10-04

## 2023-10-04 RX ORDER — LISINOPRIL AND HYDROCHLOROTHIAZIDE 12.5; 1 MG/1; MG/1
1 TABLET ORAL DAILY
Qty: 90 TABLET | Refills: 1 | Status: SHIPPED | OUTPATIENT
Start: 2023-10-04

## 2023-10-04 RX ORDER — BACLOFEN 10 MG/1
TABLET ORAL
Qty: 270 TABLET | Refills: 1 | Status: SHIPPED | OUTPATIENT
Start: 2023-10-04

## 2023-10-04 RX ORDER — DULOXETIN HYDROCHLORIDE 60 MG/1
60 CAPSULE, DELAYED RELEASE ORAL NIGHTLY
Qty: 90 CAPSULE | Refills: 1 | Status: SHIPPED | OUTPATIENT
Start: 2023-10-04

## 2023-10-04 RX ORDER — MONTELUKAST SODIUM 10 MG/1
10 TABLET ORAL NIGHTLY
Qty: 90 TABLET | Refills: 1 | Status: SHIPPED | OUTPATIENT
Start: 2023-10-04

## 2023-10-07 DIAGNOSIS — K21.9 GASTROESOPHAGEAL REFLUX DISEASE WITHOUT ESOPHAGITIS: ICD-10-CM

## 2023-10-09 RX ORDER — PANTOPRAZOLE SODIUM 20 MG/1
20 TABLET, DELAYED RELEASE ORAL DAILY
Qty: 90 TABLET | Refills: 0 | Status: SHIPPED | OUTPATIENT
Start: 2023-10-09

## 2023-10-19 ENCOUNTER — TELEPHONE (OUTPATIENT)
Dept: ORTHOPEDIC SURGERY | Facility: HOSPITAL | Age: 63
End: 2023-10-19
Payer: MEDICARE

## 2023-10-19 NOTE — TELEPHONE ENCOUNTER
Called and spoke with Ms. Spears to see if she would be interested in going home after her upcoming procedure 10/27. She said she would feel more comfortable being able to stay overnight, as would her . Ms. Spears is to be an overnight stay at this time. She doesn't have any questions. Ms. Spears has my contact information should she need anything.

## 2023-10-20 ENCOUNTER — PRE-ADMISSION TESTING (OUTPATIENT)
Dept: PREADMISSION TESTING | Facility: HOSPITAL | Age: 63
End: 2023-10-20
Payer: MEDICARE

## 2023-10-20 ENCOUNTER — HOSPITAL ENCOUNTER (OUTPATIENT)
Dept: GENERAL RADIOLOGY | Facility: HOSPITAL | Age: 63
Discharge: HOME | End: 2023-10-20
Payer: MEDICARE

## 2023-10-20 VITALS
WEIGHT: 183 LBS | OXYGEN SATURATION: 100 % | TEMPERATURE: 97.3 F | HEART RATE: 105 BPM | DIASTOLIC BLOOD PRESSURE: 93 MMHG | SYSTOLIC BLOOD PRESSURE: 154 MMHG | RESPIRATION RATE: 20 BRPM | BODY MASS INDEX: 35.93 KG/M2 | HEIGHT: 60 IN

## 2023-10-20 DIAGNOSIS — M19.011 OSTEOARTHRITIS OF GLENOHUMERAL JOINT, RIGHT: ICD-10-CM

## 2023-10-20 LAB
ANION GAP SERPL CALCULATED.3IONS-SCNC: 8.3 MMOL/L (ref 5–15)
BUN SERPL-MCNC: 11 MG/DL (ref 8–23)
BUN/CREAT SERPL: 12.1 (ref 7–25)
CALCIUM SPEC-SCNC: 9.8 MG/DL (ref 8.6–10.5)
CHLORIDE SERPL-SCNC: 102 MMOL/L (ref 98–107)
CO2 SERPL-SCNC: 27.7 MMOL/L (ref 22–29)
CREAT SERPL-MCNC: 0.91 MG/DL (ref 0.57–1)
DEPRECATED RDW RBC AUTO: 38.1 FL (ref 37–54)
EGFRCR SERPLBLD CKD-EPI 2021: 70.2 ML/MIN/1.73
ERYTHROCYTE [DISTWIDTH] IN BLOOD BY AUTOMATED COUNT: 12.1 % (ref 12.3–15.4)
GLUCOSE SERPL-MCNC: 95 MG/DL (ref 65–99)
HCT VFR BLD AUTO: 37.7 % (ref 34–46.6)
HGB BLD-MCNC: 12.7 G/DL (ref 12–15.9)
MCH RBC QN AUTO: 29.1 PG (ref 26.6–33)
MCHC RBC AUTO-ENTMCNC: 33.7 G/DL (ref 31.5–35.7)
MCV RBC AUTO: 86.3 FL (ref 79–97)
PLATELET # BLD AUTO: 393 10*3/MM3 (ref 140–450)
PMV BLD AUTO: 10 FL (ref 6–12)
POTASSIUM SERPL-SCNC: 4.4 MMOL/L (ref 3.5–5.2)
QT INTERVAL: 370 MS
QTC INTERVAL: 440 MS
RBC # BLD AUTO: 4.37 10*6/MM3 (ref 3.77–5.28)
SODIUM SERPL-SCNC: 138 MMOL/L (ref 136–145)
WBC NRBC COR # BLD: 4.16 10*3/MM3 (ref 3.4–10.8)

## 2023-10-20 PROCEDURE — 80048 BASIC METABOLIC PNL TOTAL CA: CPT

## 2023-10-20 PROCEDURE — 85027 COMPLETE CBC AUTOMATED: CPT

## 2023-10-20 PROCEDURE — 93010 ELECTROCARDIOGRAM REPORT: CPT | Performed by: INTERNAL MEDICINE

## 2023-10-20 PROCEDURE — 36415 COLL VENOUS BLD VENIPUNCTURE: CPT

## 2023-10-20 PROCEDURE — 73030 X-RAY EXAM OF SHOULDER: CPT

## 2023-10-20 PROCEDURE — 93005 ELECTROCARDIOGRAM TRACING: CPT

## 2023-10-20 RX ORDER — MULTIVIT-MIN/IRON/FOLIC ACID/K 18-600-40
2000 CAPSULE ORAL DAILY
COMMUNITY

## 2023-10-20 RX ORDER — ACETAMINOPHEN 500 MG
500 TABLET ORAL EVERY 6 HOURS PRN
COMMUNITY

## 2023-10-20 RX ORDER — ZINC GLUCONATE 50 MG
50 TABLET ORAL DAILY
COMMUNITY

## 2023-10-20 RX ORDER — MULTIVIT-MIN/IRON/FOLIC ACID/K 18-600-40
500 CAPSULE ORAL DAILY
COMMUNITY

## 2023-10-20 RX ORDER — THIAMINE HCL 100 MG
500 TABLET ORAL DAILY
COMMUNITY

## 2023-10-20 RX ORDER — IBUPROFEN 200 MG
600-800 TABLET ORAL EVERY 6 HOURS PRN
COMMUNITY
End: 2023-10-28 | Stop reason: HOSPADM

## 2023-10-20 NOTE — DISCHARGE INSTRUCTIONS
Take the following medications the morning of surgery:    Baclofen   gabapentin   protonix    If you are on prescription narcotic pain medication to control your pain you may also take that medication the morning of surgery.    General Instructions:  Do not eat solid food after midnight the night before surgery.  You may drink clear liquids day of surgery but must stop at least one hour before your hospital arrival time.  It is beneficial for you to have a clear drink that contains carbohydrates the day of surgery.  We suggest a 12 to 20 ounce bottle of Gatorade or Powerade for non-diabetic patients or a 12 to 20 ounce bottle of G2 or Powerade Zero for diabetic patients. (Pediatric patients, are not advised to drink a 12 to 20 ounce carbohydrate drink)    Clear liquids are liquids you can see through.  Nothing red in color.     Plain water                               Sports drinks  Sodas                                   Gelatin (Jell-O)  Fruit juices without pulp such as white grape juice and apple juice  Popsicles that contain no fruit or yogurt  Tea or coffee (no cream or milk added)  Gatorade / Powerade  G2 / Powerade Zero    Infants may have breast milk up to four hours before surgery.  Infants drinking formula may drink formula up to six hours before surgery.   Patients who avoid smoking, chewing tobacco and alcohol for 4 weeks prior to surgery have a reduced risk of post-operative complications.  Quit smoking as many days before surgery as you can.  Do not smoke, use chewing tobacco or drink alcohol the day of surgery.   If applicable bring your C-PAP/ BI-PAP machine in with you to preop day of surgery.  Bring any papers given to you in the doctor’s office.  Wear clean comfortable clothes.  Do not wear contact lenses, false eyelashes or make-up.  Bring a case for your glasses.   Bring crutches or walker if applicable.  Remove all piercings.  Leave jewelry and any other valuables at home.  Hair extensions  with metal clips must be removed prior to surgery.  The Pre-Admission Testing nurse will instruct you to bring medications if unable to obtain an accurate list in Pre-Admission Testing.        If you were given a blood bank ID arm band remember to bring it with you the day of surgery.    Day of surgery:10/27/2023   Hospital to call with time of arrival  Your arrival time is approximately two hours before your scheduled surgery time.  Upon arrival, a Pre-op nurse and Anesthesiologist will review your health history, obtain vital signs, and answer questions you may have.  The only belongings needed at this time will be a list of your home medications and if applicable your C-PAP/BI-PAP machine.  A Pre-op nurse will start an IV and you may receive medication in preparation for surgery, including something to help you relax.     Please be aware that surgery does come with discomfort.  We want to make every effort to control your discomfort so please discuss any uncontrolled symptoms with your nurse.   Your doctor will most likely have prescribed pain medications.      If you are going home after surgery you will receive individualized written care instructions before being discharged.  A responsible adult must drive you to and from the hospital on the day of your surgery and stay with you for 24 hours.  Discharge prescriptions can be filled by the hospital pharmacy during regular pharmacy hours.  If you are having surgery late in the day/evening your prescription may be e-prescribed to your pharmacy.  Please verify your pharmacy hours or chose a 24 hour pharmacy to avoid not having access to your prescription because your pharmacy has closed for the day.    If you are staying overnight following surgery, you will be transported to your hospital room following the recovery period.  [facility] has all private rooms.    If you have any questions please call Pre-Admission Testing at  [phone].  Deductibles and co-payments are collected on the day of service. Please be prepared to pay the required co-pay, deductible or deposit on the day of service as defined by your plan.    Call your surgeon immediately if you experience any of the following symptoms:  Sore Throat  Shortness of Breath or difficulty breathing  Cough  Chills  Body soreness or muscle pain  Headache  Fever  New loss of taste or smell  Do not arrive for your surgery ill.  Your procedure will need to be rescheduled to another time.  You will need to call your physician before the day of surgery to avoid any unnecessary exposure to hospital staff as well as other patients.        PREVENTING INFECTION IN SHOULDER SURGICAL SITES     C. acnes is a bacteria that lives deep within follicles and pores of the skin. It is found in large numbers on the skin of the face, axilla (armpit), chest and back and is the primary bacteria to cause a surgical site infection after shoulder surgery.      Use of a Benzoyl Peroxide solution prior to shoulder surgery decreases C. acnes and reduces post-op infections.   Your surgeon has ordered 5% Benzoyl Peroxide wash to be used three times prior to your surgery.     Please read the following instructions carefully and bring this form with you the day of surgery.     General bathing instructions starting two days before your surgery:    Shower using a fresh bar of anti-bacterial soap (such as Dial) and clean washcloth.  Pay special attention to the neck, shoulder and armpit area.   Wash your hair as usual with your regular shampoo.   Rinse hair and body thoroughly with warm water (not hot water) to remove shampoo and residue.   Dry with a clean towel.              Sleep in a clean bed with clean clothing.  Do not allow pets to sleep with you.     For 2 days before surgery, avoid shaving with a razor because the razor can irritate skin and make it easier to develop an infection.    Any areas of open skin  can increase the risk of a post-operative wound infection by allowing bacteria to enter and travel throughout the body.  Notify your surgeon if you have any skin wounds / rashes even if it is not near the expected surgical site.  The area will need assessed to determine if surgery should be delayed until it is healed.      First application of 5% Benzoyl Peroxide Wash two nights before surgery:                                                                Wash neck, shoulder (front, back, side) and armpit   with warm water, rinse and dry - see picture.  Gently wash the same areas with the Benzoyl Peroxide   cleanser going away from the neck for 10-20 seconds.   Work into a full lather and leave on the skin for   2 minutes for greatest effect.  Rinse thoroughly with warm water, not hot water.                     Pat dry with a clean towel.                                                            Wash your hands thoroughly.  Do not apply lotion, powder, perfume or deodorant.   Put on clean clothes.    Second application the night before surgery:  Repeat the above steps.        Third application morning of surgery:  Repeat the above steps.    Due to shoulder pain or decreased range of motion of your shoulder and arm, you may need assistance washing under the arm or the back portion of your shoulder.     Avoid further washing the areas of the skin treated with Benzoyl Peroxide for at least 1 hour.    For your convenience, you may purchase Benzoyl Peroxide at [facility] retail pharmacy.     Warning:  Let your physician know if you are allergic to Benzoyl Peroxide or have very sensitive skin and cannot use it.   Stop using and contact your surgeon if you experience any excessive scaling, itching, swelling, skin irritation or other signs of a reaction.  Keep out of eyes, ears, nose and mouth.  Do not apply to sunburned, irritated or broken area on the skin.  Avoid unwanted problems with bleaching effect by following  these tips:  Wash hands after each use.  Avoid contact with hair, clothing, furnishings or carpeting.  Wear clean, old t-shirt or clothing to bed.   Use clean, old white pillow cases and sheets to avoid discoloring your bed linens.                                                                                                                                                                                                                                                                                   Please complete the checklist below, bring it with you to the hospital                               the day of surgery and give to the Pre-op Nurse     Preoperative Skin Prep Checklist        Patient Name Label             Enter dates and ?  boxes to indicate completed    Surgery Date: _______________ Regular Shower  Benzoyl Peroxide Wash   First Application:  2 days before_______________  (Stop shaving all body parts)           Morning or Evening                        Evening    Second Application:  1 day  before________________        Morning or Evening                          Evening   Third Application:  Day of Surgery______________                           Morning                   Morning

## 2023-10-27 ENCOUNTER — APPOINTMENT (OUTPATIENT)
Dept: GENERAL RADIOLOGY | Facility: HOSPITAL | Age: 63
End: 2023-10-27
Payer: OTHER GOVERNMENT

## 2023-10-27 ENCOUNTER — ANESTHESIA (OUTPATIENT)
Dept: PERIOP | Facility: HOSPITAL | Age: 63
End: 2023-10-27
Payer: OTHER GOVERNMENT

## 2023-10-27 ENCOUNTER — ANESTHESIA EVENT (OUTPATIENT)
Dept: PERIOP | Facility: HOSPITAL | Age: 63
End: 2023-10-27
Payer: OTHER GOVERNMENT

## 2023-10-27 ENCOUNTER — HOSPITAL ENCOUNTER (OUTPATIENT)
Facility: HOSPITAL | Age: 63
Discharge: HOME | End: 2023-10-28
Attending: ORTHOPAEDIC SURGERY | Admitting: ORTHOPAEDIC SURGERY
Payer: MEDICARE

## 2023-10-27 DIAGNOSIS — M75.101 ROTATOR CUFF TEAR ARTHROPATHY OF RIGHT SHOULDER: Primary | ICD-10-CM

## 2023-10-27 DIAGNOSIS — M12.811 ROTATOR CUFF TEAR ARTHROPATHY OF RIGHT SHOULDER: Primary | ICD-10-CM

## 2023-10-27 DIAGNOSIS — M19.011 OSTEOARTHRITIS OF GLENOHUMERAL JOINT, RIGHT: ICD-10-CM

## 2023-10-27 DIAGNOSIS — M19.011 OSTEOARTHRITIS OF GLENOHUMERAL JOINT, RIGHT: Primary | ICD-10-CM

## 2023-10-27 PROCEDURE — 25010000002 ROPIVACAINE PER 1 MG: Performed by: STUDENT IN AN ORGANIZED HEALTH CARE EDUCATION/TRAINING PROGRAM

## 2023-10-27 PROCEDURE — 25010000002 ONDANSETRON PER 1 MG

## 2023-10-27 PROCEDURE — 25010000002 MAGNESIUM SULFATE PER 500 MG OF MAGNESIUM

## 2023-10-27 PROCEDURE — 25010000002 PROPOFOL 10 MG/ML EMULSION

## 2023-10-27 PROCEDURE — C1776 JOINT DEVICE (IMPLANTABLE): HCPCS | Performed by: ORTHOPAEDIC SURGERY

## 2023-10-27 PROCEDURE — 73020 X-RAY EXAM OF SHOULDER: CPT

## 2023-10-27 PROCEDURE — 25010000002 MIDAZOLAM PER 1 MG: Performed by: STUDENT IN AN ORGANIZED HEALTH CARE EDUCATION/TRAINING PROGRAM

## 2023-10-27 PROCEDURE — 25010000002 VANCOMYCIN 10 G RECONSTITUTED SOLUTION: Performed by: PHYSICIAN ASSISTANT

## 2023-10-27 PROCEDURE — G0378 HOSPITAL OBSERVATION PER HR: HCPCS

## 2023-10-27 PROCEDURE — 25010000002 CEFAZOLIN IN DEXTROSE 2-4 GM/100ML-% SOLUTION: Performed by: PHYSICIAN ASSISTANT

## 2023-10-27 PROCEDURE — 25810000003 SODIUM CHLORIDE 0.9 % SOLUTION: Performed by: PHYSICIAN ASSISTANT

## 2023-10-27 PROCEDURE — 25010000002 ROPIVACAINE PER 1 MG: Performed by: ORTHOPAEDIC SURGERY

## 2023-10-27 PROCEDURE — 25810000003 LACTATED RINGERS PER 1000 ML: Performed by: STUDENT IN AN ORGANIZED HEALTH CARE EDUCATION/TRAINING PROGRAM

## 2023-10-27 PROCEDURE — 25810000003 LACTATED RINGERS SOLUTION: Performed by: PHYSICIAN ASSISTANT

## 2023-10-27 PROCEDURE — 25010000002 KETOROLAC TROMETHAMINE PER 15 MG: Performed by: ORTHOPAEDIC SURGERY

## 2023-10-27 PROCEDURE — L3670 SO ACRO/CLAV CAN WEB PRE OTS: HCPCS | Performed by: ORTHOPAEDIC SURGERY

## 2023-10-27 PROCEDURE — C1713 ANCHOR/SCREW BN/BN,TIS/BN: HCPCS | Performed by: ORTHOPAEDIC SURGERY

## 2023-10-27 PROCEDURE — 25010000002 DEXAMETHASONE PER 1 MG: Performed by: STUDENT IN AN ORGANIZED HEALTH CARE EDUCATION/TRAINING PROGRAM

## 2023-10-27 PROCEDURE — S0260 H&P FOR SURGERY: HCPCS | Performed by: ORTHOPAEDIC SURGERY

## 2023-10-27 PROCEDURE — 25010000002 SUGAMMADEX 200 MG/2ML SOLUTION

## 2023-10-27 PROCEDURE — 23472 RECONSTRUCT SHOULDER JOINT: CPT | Performed by: ORTHOPAEDIC SURGERY

## 2023-10-27 PROCEDURE — 25010000002 MORPHINE PER 10 MG: Performed by: ORTHOPAEDIC SURGERY

## 2023-10-27 PROCEDURE — 25010000002 CEFAZOLIN IN DEXTROSE 2-4 GM/100ML-% SOLUTION: Performed by: ORTHOPAEDIC SURGERY

## 2023-10-27 PROCEDURE — 25010000002 EPINEPHRINE 1 MG/ML SOLUTION 30 ML VIAL: Performed by: ORTHOPAEDIC SURGERY

## 2023-10-27 DEVICE — ARISTA AH ABSORBABLE HEMOSTATIC PARTICLES
Type: IMPLANTABLE DEVICE | Site: SHOULDER | Status: FUNCTIONAL
Brand: ARISTA™ AH

## 2023-10-27 DEVICE — KNOTLESS TISSUE CONTROL DEVICE, UNDYED UNIDIRECTIONAL (ANTIBACTERIAL) SYNTHETIC ABSORBABLE DEVICE
Type: IMPLANTABLE DEVICE | Site: SHOULDER | Status: FUNCTIONAL
Brand: STRATAFIX

## 2023-10-27 DEVICE — IMPLANTABLE DEVICE
Type: IMPLANTABLE DEVICE | Site: SHOULDER | Status: FUNCTIONAL
Brand: COMPREHENSIVE® REVERSE SHOULDER

## 2023-10-27 DEVICE — GLENOSPHERE VERSA DIAL FXD OFFST36 PLS3: Type: IMPLANTABLE DEVICE | Site: SHOULDER | Status: FUNCTIONAL

## 2023-10-27 DEVICE — IMPLANTABLE DEVICE
Type: IMPLANTABLE DEVICE | Site: SHOULDER | Status: FUNCTIONAL
Brand: COMPREHENSIVE REVERSE SHOULDER

## 2023-10-27 DEVICE — BLUE CO-BRAID POLYETHYLENE SIZE 2 38" C-7 NEEDLE BIOMET
Type: IMPLANTABLE DEVICE | Site: SHOULDER | Status: FUNCTIONAL
Brand: TELEFLEX

## 2023-10-27 DEVICE — TOTL SHLDER REV: Type: IMPLANTABLE DEVICE | Site: SHOULDER | Status: FUNCTIONAL

## 2023-10-27 DEVICE — SUTURE
Type: IMPLANTABLE DEVICE | Site: SHOULDER | Status: FUNCTIONAL
Brand: BROADBAND TAPE

## 2023-10-27 DEVICE — LINER HUM/SHLDR TRABECULARMETAL REV POLY 60DEG 36MM PLS3: Type: IMPLANTABLE DEVICE | Site: SHOULDER | Status: FUNCTIONAL

## 2023-10-27 DEVICE — STEM HUM/SHLDR TRABECULARMETAL REV 10X130MM: Type: IMPLANTABLE DEVICE | Site: SHOULDER | Status: FUNCTIONAL

## 2023-10-27 RX ORDER — LABETALOL HYDROCHLORIDE 5 MG/ML
5 INJECTION, SOLUTION INTRAVENOUS
Status: DISCONTINUED | OUTPATIENT
Start: 2023-10-27 | End: 2023-10-27 | Stop reason: HOSPADM

## 2023-10-27 RX ORDER — DROPERIDOL 2.5 MG/ML
0.62 INJECTION, SOLUTION INTRAMUSCULAR; INTRAVENOUS
Status: DISCONTINUED | OUTPATIENT
Start: 2023-10-27 | End: 2023-10-27 | Stop reason: HOSPADM

## 2023-10-27 RX ORDER — HYDROCODONE BITARTRATE AND ACETAMINOPHEN 7.5; 325 MG/1; MG/1
1 TABLET ORAL EVERY 4 HOURS PRN
Status: DISCONTINUED | OUTPATIENT
Start: 2023-10-27 | End: 2023-10-27 | Stop reason: HOSPADM

## 2023-10-27 RX ORDER — LIDOCAINE HYDROCHLORIDE 10 MG/ML
0.5 INJECTION, SOLUTION INFILTRATION; PERINEURAL ONCE AS NEEDED
Status: DISCONTINUED | OUTPATIENT
Start: 2023-10-27 | End: 2023-10-27 | Stop reason: HOSPADM

## 2023-10-27 RX ORDER — DIPHENHYDRAMINE HYDROCHLORIDE 50 MG/ML
12.5 INJECTION INTRAMUSCULAR; INTRAVENOUS
Status: DISCONTINUED | OUTPATIENT
Start: 2023-10-27 | End: 2023-10-27 | Stop reason: HOSPADM

## 2023-10-27 RX ORDER — CEFAZOLIN SODIUM 2 G/100ML
2 INJECTION, SOLUTION INTRAVENOUS ONCE
Status: COMPLETED | OUTPATIENT
Start: 2023-10-27 | End: 2023-10-27

## 2023-10-27 RX ORDER — ONDANSETRON 2 MG/ML
4 INJECTION INTRAMUSCULAR; INTRAVENOUS EVERY 6 HOURS PRN
Status: DISCONTINUED | OUTPATIENT
Start: 2023-10-27 | End: 2023-10-28 | Stop reason: HOSPADM

## 2023-10-27 RX ORDER — IPRATROPIUM BROMIDE AND ALBUTEROL SULFATE 2.5; .5 MG/3ML; MG/3ML
3 SOLUTION RESPIRATORY (INHALATION) ONCE AS NEEDED
Status: DISCONTINUED | OUTPATIENT
Start: 2023-10-27 | End: 2023-10-27 | Stop reason: HOSPADM

## 2023-10-27 RX ORDER — CEFAZOLIN SODIUM 2 G/100ML
2000 INJECTION, SOLUTION INTRAVENOUS EVERY 8 HOURS
Status: COMPLETED | OUTPATIENT
Start: 2023-10-27 | End: 2023-10-28

## 2023-10-27 RX ORDER — LISINOPRIL 10 MG/1
10 TABLET ORAL
Status: DISCONTINUED | OUTPATIENT
Start: 2023-10-27 | End: 2023-10-28 | Stop reason: HOSPADM

## 2023-10-27 RX ORDER — PHENYLEPHRINE HCL IN 0.9% NACL 1 MG/10 ML
SYRINGE (ML) INTRAVENOUS AS NEEDED
Status: DISCONTINUED | OUTPATIENT
Start: 2023-10-27 | End: 2023-10-27 | Stop reason: SURG

## 2023-10-27 RX ORDER — ASPIRIN 81 MG/1
81 TABLET ORAL DAILY
Status: DISCONTINUED | OUTPATIENT
Start: 2023-10-28 | End: 2023-10-28 | Stop reason: HOSPADM

## 2023-10-27 RX ORDER — ONDANSETRON 4 MG/1
4 TABLET, FILM COATED ORAL EVERY 6 HOURS PRN
Qty: 10 TABLET | Refills: 0 | Status: SHIPPED | OUTPATIENT
Start: 2023-10-27

## 2023-10-27 RX ORDER — FENTANYL CITRATE 50 UG/ML
25 INJECTION, SOLUTION INTRAMUSCULAR; INTRAVENOUS
Status: DISCONTINUED | OUTPATIENT
Start: 2023-10-27 | End: 2023-10-27 | Stop reason: HOSPADM

## 2023-10-27 RX ORDER — HYDROCHLOROTHIAZIDE 12.5 MG/1
12.5 TABLET ORAL
Status: DISCONTINUED | OUTPATIENT
Start: 2023-10-27 | End: 2023-10-27

## 2023-10-27 RX ORDER — DEXAMETHASONE SODIUM PHOSPHATE 4 MG/ML
INJECTION, SOLUTION INTRA-ARTICULAR; INTRALESIONAL; INTRAMUSCULAR; INTRAVENOUS; SOFT TISSUE AS NEEDED
Status: DISCONTINUED | OUTPATIENT
Start: 2023-10-27 | End: 2023-10-27 | Stop reason: SURG

## 2023-10-27 RX ORDER — BISACODYL 10 MG
10 SUPPOSITORY, RECTAL RECTAL DAILY PRN
Status: DISCONTINUED | OUTPATIENT
Start: 2023-10-27 | End: 2023-10-28 | Stop reason: HOSPADM

## 2023-10-27 RX ORDER — PANTOPRAZOLE SODIUM 40 MG/1
40 TABLET, DELAYED RELEASE ORAL EVERY MORNING
Status: DISCONTINUED | OUTPATIENT
Start: 2023-10-28 | End: 2023-10-28 | Stop reason: HOSPADM

## 2023-10-27 RX ORDER — SODIUM CHLORIDE 0.9 % (FLUSH) 0.9 %
3-10 SYRINGE (ML) INJECTION AS NEEDED
Status: DISCONTINUED | OUTPATIENT
Start: 2023-10-27 | End: 2023-10-27 | Stop reason: HOSPADM

## 2023-10-27 RX ORDER — PANTOPRAZOLE SODIUM 20 MG/1
20 TABLET, DELAYED RELEASE ORAL EVERY MORNING
Status: DISCONTINUED | OUTPATIENT
Start: 2023-10-28 | End: 2023-10-27

## 2023-10-27 RX ORDER — ACETAMINOPHEN 325 MG/1
325 TABLET ORAL EVERY 4 HOURS PRN
Status: DISCONTINUED | OUTPATIENT
Start: 2023-10-27 | End: 2023-10-28 | Stop reason: HOSPADM

## 2023-10-27 RX ORDER — MORPHINE SULFATE 2 MG/ML
2 INJECTION, SOLUTION INTRAMUSCULAR; INTRAVENOUS
Status: DISCONTINUED | OUTPATIENT
Start: 2023-10-27 | End: 2023-10-28 | Stop reason: HOSPADM

## 2023-10-27 RX ORDER — EPHEDRINE SULFATE 50 MG/ML
5 INJECTION, SOLUTION INTRAVENOUS ONCE AS NEEDED
Status: DISCONTINUED | OUTPATIENT
Start: 2023-10-27 | End: 2023-10-27 | Stop reason: HOSPADM

## 2023-10-27 RX ORDER — OXYCODONE HYDROCHLORIDE AND ACETAMINOPHEN 5; 325 MG/1; MG/1
1 TABLET ORAL EVERY 4 HOURS PRN
Status: DISCONTINUED | OUTPATIENT
Start: 2023-10-27 | End: 2023-10-28

## 2023-10-27 RX ORDER — HYDROMORPHONE HYDROCHLORIDE 1 MG/ML
0.25 INJECTION, SOLUTION INTRAMUSCULAR; INTRAVENOUS; SUBCUTANEOUS
Status: DISCONTINUED | OUTPATIENT
Start: 2023-10-27 | End: 2023-10-27 | Stop reason: HOSPADM

## 2023-10-27 RX ORDER — PROPOFOL 10 MG/ML
VIAL (ML) INTRAVENOUS AS NEEDED
Status: DISCONTINUED | OUTPATIENT
Start: 2023-10-27 | End: 2023-10-27 | Stop reason: SURG

## 2023-10-27 RX ORDER — GLYCOPYRROLATE 0.2 MG/ML
INJECTION INTRAMUSCULAR; INTRAVENOUS AS NEEDED
Status: DISCONTINUED | OUTPATIENT
Start: 2023-10-27 | End: 2023-10-27 | Stop reason: SURG

## 2023-10-27 RX ORDER — ROPIVACAINE HYDROCHLORIDE 5 MG/ML
INJECTION, SOLUTION EPIDURAL; INFILTRATION; PERINEURAL
Status: COMPLETED | OUTPATIENT
Start: 2023-10-27 | End: 2023-10-27

## 2023-10-27 RX ORDER — GABAPENTIN 300 MG/1
300 CAPSULE ORAL 3 TIMES DAILY PRN
Status: DISCONTINUED | OUTPATIENT
Start: 2023-10-27 | End: 2023-10-28 | Stop reason: HOSPADM

## 2023-10-27 RX ORDER — NALOXONE HCL 0.4 MG/ML
0.4 VIAL (ML) INJECTION
Status: DISCONTINUED | OUTPATIENT
Start: 2023-10-27 | End: 2023-10-28 | Stop reason: HOSPADM

## 2023-10-27 RX ORDER — ONDANSETRON 2 MG/ML
INJECTION INTRAMUSCULAR; INTRAVENOUS AS NEEDED
Status: DISCONTINUED | OUTPATIENT
Start: 2023-10-27 | End: 2023-10-27 | Stop reason: SURG

## 2023-10-27 RX ORDER — PROMETHAZINE HYDROCHLORIDE 25 MG/1
25 SUPPOSITORY RECTAL ONCE AS NEEDED
Status: DISCONTINUED | OUTPATIENT
Start: 2023-10-27 | End: 2023-10-27 | Stop reason: HOSPADM

## 2023-10-27 RX ORDER — DOCUSATE SODIUM 100 MG/1
100 CAPSULE, LIQUID FILLED ORAL 2 TIMES DAILY
Status: DISCONTINUED | OUTPATIENT
Start: 2023-10-27 | End: 2023-10-28 | Stop reason: HOSPADM

## 2023-10-27 RX ORDER — MAGNESIUM HYDROXIDE 1200 MG/15ML
LIQUID ORAL AS NEEDED
Status: DISCONTINUED | OUTPATIENT
Start: 2023-10-27 | End: 2023-10-27 | Stop reason: HOSPADM

## 2023-10-27 RX ORDER — CLONAZEPAM 0.5 MG/1
2 TABLET ORAL NIGHTLY
Status: DISCONTINUED | OUTPATIENT
Start: 2023-10-27 | End: 2023-10-28 | Stop reason: HOSPADM

## 2023-10-27 RX ORDER — HYDROCODONE BITARTRATE AND ACETAMINOPHEN 5; 325 MG/1; MG/1
1 TABLET ORAL ONCE AS NEEDED
Status: DISCONTINUED | OUTPATIENT
Start: 2023-10-27 | End: 2023-10-27 | Stop reason: HOSPADM

## 2023-10-27 RX ORDER — NALOXONE HCL 0.4 MG/ML
0.2 VIAL (ML) INJECTION AS NEEDED
Status: DISCONTINUED | OUTPATIENT
Start: 2023-10-27 | End: 2023-10-27 | Stop reason: HOSPADM

## 2023-10-27 RX ORDER — OXYCODONE HYDROCHLORIDE AND ACETAMINOPHEN 5; 325 MG/1; MG/1
TABLET ORAL
Qty: 40 TABLET | Refills: 0 | Status: SHIPPED | OUTPATIENT
Start: 2023-10-27

## 2023-10-27 RX ORDER — PROMETHAZINE HYDROCHLORIDE 25 MG/1
25 TABLET ORAL ONCE AS NEEDED
Status: DISCONTINUED | OUTPATIENT
Start: 2023-10-27 | End: 2023-10-27 | Stop reason: HOSPADM

## 2023-10-27 RX ORDER — MONTELUKAST SODIUM 10 MG/1
10 TABLET ORAL NIGHTLY
Status: DISCONTINUED | OUTPATIENT
Start: 2023-10-27 | End: 2023-10-28 | Stop reason: HOSPADM

## 2023-10-27 RX ORDER — LIDOCAINE HYDROCHLORIDE 20 MG/ML
INJECTION, SOLUTION EPIDURAL; INFILTRATION; INTRACAUDAL; PERINEURAL AS NEEDED
Status: DISCONTINUED | OUTPATIENT
Start: 2023-10-27 | End: 2023-10-27 | Stop reason: SURG

## 2023-10-27 RX ORDER — ACETAMINOPHEN 325 MG/1
1000 TABLET ORAL ONCE
Status: COMPLETED | OUTPATIENT
Start: 2023-10-27 | End: 2023-10-27

## 2023-10-27 RX ORDER — SODIUM CHLORIDE 0.9 % (FLUSH) 0.9 %
3 SYRINGE (ML) INJECTION EVERY 12 HOURS SCHEDULED
Status: DISCONTINUED | OUTPATIENT
Start: 2023-10-27 | End: 2023-10-27 | Stop reason: HOSPADM

## 2023-10-27 RX ORDER — CETIRIZINE HYDROCHLORIDE 10 MG/1
10 TABLET ORAL NIGHTLY
Status: DISCONTINUED | OUTPATIENT
Start: 2023-10-27 | End: 2023-10-28 | Stop reason: HOSPADM

## 2023-10-27 RX ORDER — ATORVASTATIN CALCIUM 20 MG/1
10 TABLET, FILM COATED ORAL DAILY
Status: DISCONTINUED | OUTPATIENT
Start: 2023-10-27 | End: 2023-10-28 | Stop reason: HOSPADM

## 2023-10-27 RX ORDER — OXYCODONE HYDROCHLORIDE AND ACETAMINOPHEN 5; 325 MG/1; MG/1
2 TABLET ORAL EVERY 4 HOURS PRN
Status: DISCONTINUED | OUTPATIENT
Start: 2023-10-27 | End: 2023-10-28

## 2023-10-27 RX ORDER — DEXAMETHASONE SODIUM PHOSPHATE 4 MG/ML
INJECTION, SOLUTION INTRA-ARTICULAR; INTRALESIONAL; INTRAMUSCULAR; INTRAVENOUS; SOFT TISSUE
Status: COMPLETED | OUTPATIENT
Start: 2023-10-27 | End: 2023-10-27

## 2023-10-27 RX ORDER — SODIUM CHLORIDE, SODIUM LACTATE, POTASSIUM CHLORIDE, CALCIUM CHLORIDE 600; 310; 30; 20 MG/100ML; MG/100ML; MG/100ML; MG/100ML
75 INJECTION, SOLUTION INTRAVENOUS CONTINUOUS
Status: DISCONTINUED | OUTPATIENT
Start: 2023-10-27 | End: 2023-10-28 | Stop reason: HOSPADM

## 2023-10-27 RX ORDER — BACLOFEN 10 MG/1
10 TABLET ORAL 3 TIMES DAILY
Status: DISCONTINUED | OUTPATIENT
Start: 2023-10-27 | End: 2023-10-28 | Stop reason: HOSPADM

## 2023-10-27 RX ORDER — MIDAZOLAM HYDROCHLORIDE 1 MG/ML
1 INJECTION INTRAMUSCULAR; INTRAVENOUS
Status: DISCONTINUED | OUTPATIENT
Start: 2023-10-27 | End: 2023-10-27 | Stop reason: HOSPADM

## 2023-10-27 RX ORDER — BISACODYL 5 MG/1
10 TABLET, DELAYED RELEASE ORAL DAILY PRN
Status: DISCONTINUED | OUTPATIENT
Start: 2023-10-27 | End: 2023-10-28 | Stop reason: HOSPADM

## 2023-10-27 RX ORDER — FLUMAZENIL 0.1 MG/ML
0.2 INJECTION INTRAVENOUS AS NEEDED
Status: DISCONTINUED | OUTPATIENT
Start: 2023-10-27 | End: 2023-10-27 | Stop reason: HOSPADM

## 2023-10-27 RX ORDER — MAGNESIUM SULFATE HEPTAHYDRATE 500 MG/ML
INJECTION, SOLUTION INTRAMUSCULAR; INTRAVENOUS AS NEEDED
Status: DISCONTINUED | OUTPATIENT
Start: 2023-10-27 | End: 2023-10-27 | Stop reason: SURG

## 2023-10-27 RX ORDER — PREGABALIN 75 MG/1
150 CAPSULE ORAL ONCE
Status: DISCONTINUED | OUTPATIENT
Start: 2023-10-27 | End: 2023-10-27 | Stop reason: HOSPADM

## 2023-10-27 RX ORDER — HYDRALAZINE HYDROCHLORIDE 20 MG/ML
5 INJECTION INTRAMUSCULAR; INTRAVENOUS
Status: DISCONTINUED | OUTPATIENT
Start: 2023-10-27 | End: 2023-10-27 | Stop reason: HOSPADM

## 2023-10-27 RX ORDER — ROCURONIUM BROMIDE 10 MG/ML
INJECTION, SOLUTION INTRAVENOUS AS NEEDED
Status: DISCONTINUED | OUTPATIENT
Start: 2023-10-27 | End: 2023-10-27 | Stop reason: SURG

## 2023-10-27 RX ORDER — ONDANSETRON 2 MG/ML
4 INJECTION INTRAMUSCULAR; INTRAVENOUS ONCE AS NEEDED
Status: DISCONTINUED | OUTPATIENT
Start: 2023-10-27 | End: 2023-10-27 | Stop reason: HOSPADM

## 2023-10-27 RX ORDER — ONDANSETRON 4 MG/1
4 TABLET, FILM COATED ORAL EVERY 6 HOURS PRN
Status: DISCONTINUED | OUTPATIENT
Start: 2023-10-27 | End: 2023-10-28 | Stop reason: HOSPADM

## 2023-10-27 RX ORDER — MELOXICAM 15 MG/1
15 TABLET ORAL ONCE
Status: COMPLETED | OUTPATIENT
Start: 2023-10-27 | End: 2023-10-27

## 2023-10-27 RX ORDER — PSEUDOEPHEDRINE HCL 30 MG
100 TABLET ORAL 2 TIMES DAILY
Qty: 60 CAPSULE | Refills: 0 | Status: SHIPPED | OUTPATIENT
Start: 2023-10-27

## 2023-10-27 RX ORDER — DULOXETIN HYDROCHLORIDE 60 MG/1
60 CAPSULE, DELAYED RELEASE ORAL NIGHTLY
Status: DISCONTINUED | OUTPATIENT
Start: 2023-10-27 | End: 2023-10-28 | Stop reason: HOSPADM

## 2023-10-27 RX ORDER — FLUTICASONE PROPIONATE 50 MCG
1 SPRAY, SUSPENSION (ML) NASAL AS NEEDED
Status: DISCONTINUED | OUTPATIENT
Start: 2023-10-27 | End: 2023-10-28 | Stop reason: HOSPADM

## 2023-10-27 RX ORDER — SODIUM CHLORIDE, SODIUM LACTATE, POTASSIUM CHLORIDE, CALCIUM CHLORIDE 600; 310; 30; 20 MG/100ML; MG/100ML; MG/100ML; MG/100ML
9 INJECTION, SOLUTION INTRAVENOUS CONTINUOUS
Status: DISCONTINUED | OUTPATIENT
Start: 2023-10-27 | End: 2023-10-27

## 2023-10-27 RX ADMIN — ACETAMINOPHEN 975 MG: 325 TABLET, FILM COATED ORAL at 06:06

## 2023-10-27 RX ADMIN — DOCUSATE SODIUM 100 MG: 100 CAPSULE, LIQUID FILLED ORAL at 20:09

## 2023-10-27 RX ADMIN — BACLOFEN 10 MG: 10 TABLET ORAL at 20:10

## 2023-10-27 RX ADMIN — MELOXICAM 15 MG: 15 TABLET ORAL at 06:07

## 2023-10-27 RX ADMIN — MIDAZOLAM 2 MG: 1 INJECTION INTRAMUSCULAR; INTRAVENOUS at 06:24

## 2023-10-27 RX ADMIN — CETIRIZINE HYDROCHLORIDE 10 MG: 10 TABLET ORAL at 20:10

## 2023-10-27 RX ADMIN — ROCURONIUM BROMIDE 10 MG: 10 INJECTION, SOLUTION INTRAVENOUS at 08:04

## 2023-10-27 RX ADMIN — DULOXETINE HYDROCHLORIDE 60 MG: 60 CAPSULE, DELAYED RELEASE ORAL at 20:09

## 2023-10-27 RX ADMIN — CEFAZOLIN SODIUM 2000 MG: 2 INJECTION, SOLUTION INTRAVENOUS at 23:27

## 2023-10-27 RX ADMIN — Medication 100 MCG: at 08:44

## 2023-10-27 RX ADMIN — ROCURONIUM BROMIDE 40 MG: 10 INJECTION, SOLUTION INTRAVENOUS at 07:10

## 2023-10-27 RX ADMIN — DEXAMETHASONE SODIUM PHOSPHATE 4 MG: 4 INJECTION, SOLUTION INTRA-ARTICULAR; INTRALESIONAL; INTRAMUSCULAR; INTRAVENOUS; SOFT TISSUE at 06:27

## 2023-10-27 RX ADMIN — MONTELUKAST SODIUM 10 MG: 10 TABLET, FILM COATED ORAL at 20:10

## 2023-10-27 RX ADMIN — PROPOFOL 200 MG: 10 INJECTION, EMULSION INTRAVENOUS at 07:10

## 2023-10-27 RX ADMIN — LIDOCAINE HYDROCHLORIDE 60 MG: 20 INJECTION, SOLUTION EPIDURAL; INFILTRATION; INTRACAUDAL; PERINEURAL at 07:10

## 2023-10-27 RX ADMIN — Medication 100 MCG: at 08:40

## 2023-10-27 RX ADMIN — GLYCOPYRROLATE 0.1 MG: 1 INJECTION INTRAMUSCULAR; INTRAVENOUS at 07:10

## 2023-10-27 RX ADMIN — DEXAMETHASONE SODIUM PHOSPHATE 8 MG: 4 INJECTION, SOLUTION INTRA-ARTICULAR; INTRALESIONAL; INTRAMUSCULAR; INTRAVENOUS; SOFT TISSUE at 07:24

## 2023-10-27 RX ADMIN — BACLOFEN 10 MG: 10 TABLET ORAL at 16:31

## 2023-10-27 RX ADMIN — ROPIVACAINE HYDROCHLORIDE 20 ML: 5 INJECTION EPIDURAL; INFILTRATION; PERINEURAL at 06:27

## 2023-10-27 RX ADMIN — SODIUM CHLORIDE, POTASSIUM CHLORIDE, SODIUM LACTATE AND CALCIUM CHLORIDE 500 ML: 600; 310; 30; 20 INJECTION, SOLUTION INTRAVENOUS at 06:15

## 2023-10-27 RX ADMIN — PROPOFOL 25 MCG/KG/MIN: 10 INJECTION, EMULSION INTRAVENOUS at 07:25

## 2023-10-27 RX ADMIN — Medication 100 MCG: at 08:04

## 2023-10-27 RX ADMIN — CLONAZEPAM 2 MG: 0.5 TABLET ORAL at 20:09

## 2023-10-27 RX ADMIN — SUGAMMADEX 200 MG: 100 INJECTION, SOLUTION INTRAVENOUS at 09:05

## 2023-10-27 RX ADMIN — ONDANSETRON 4 MG: 2 INJECTION INTRAMUSCULAR; INTRAVENOUS at 08:53

## 2023-10-27 RX ADMIN — CEFAZOLIN SODIUM 2 G: 2 INJECTION, SOLUTION INTRAVENOUS at 07:00

## 2023-10-27 RX ADMIN — VANCOMYCIN HYDROCHLORIDE 1250 MG: 10 INJECTION, POWDER, LYOPHILIZED, FOR SOLUTION INTRAVENOUS at 06:15

## 2023-10-27 RX ADMIN — SODIUM CHLORIDE, POTASSIUM CHLORIDE, SODIUM LACTATE AND CALCIUM CHLORIDE: 600; 310; 30; 20 INJECTION, SOLUTION INTRAVENOUS at 07:03

## 2023-10-27 RX ADMIN — MAGNESIUM SULFATE HEPTAHYDRATE 1 G: 500 INJECTION, SOLUTION INTRAMUSCULAR; INTRAVENOUS at 07:24

## 2023-10-27 RX ADMIN — Medication 100 MCG: at 08:00

## 2023-10-27 RX ADMIN — ROCURONIUM BROMIDE 10 MG: 10 INJECTION, SOLUTION INTRAVENOUS at 07:33

## 2023-10-27 RX ADMIN — OXYCODONE HYDROCHLORIDE AND ACETAMINOPHEN 1 TABLET: 5; 325 TABLET ORAL at 20:10

## 2023-10-27 RX ADMIN — Medication 100 MCG: at 07:39

## 2023-10-27 RX ADMIN — CEFAZOLIN SODIUM 2000 MG: 2 INJECTION, SOLUTION INTRAVENOUS at 16:31

## 2023-10-27 RX ADMIN — PROPOFOL 50 MG: 10 INJECTION, EMULSION INTRAVENOUS at 08:04

## 2023-10-27 RX ADMIN — Medication 200 MCG: at 07:29

## 2023-10-27 RX ADMIN — SODIUM CHLORIDE, POTASSIUM CHLORIDE, SODIUM LACTATE AND CALCIUM CHLORIDE: 600; 310; 30; 20 INJECTION, SOLUTION INTRAVENOUS at 08:35

## 2023-10-27 RX ADMIN — Medication 100 MCG: at 08:30

## 2023-10-27 NOTE — ANESTHESIA PREPROCEDURE EVALUATION
Anesthesia Evaluation     history of anesthetic complications:  PONV               Airway   Mallampati: II  TM distance: >3 FB  Neck ROM: full  Dental      Pulmonary    (+) COPD,sleep apnea on CPAP  Cardiovascular     ECG reviewed    (+) hypertension    ROS comment: Normal EKG    Neuro/Psych  GI/Hepatic/Renal/Endo    (+) GERD    Musculoskeletal     Abdominal   (+) obese   Substance History      OB/GYN          Other                          Anesthesia Plan    ASA 3     general with block     intravenous induction     Anesthetic plan, risks, benefits, and alternatives have been provided, discussed and informed consent has been obtained with: patient.        CODE STATUS:

## 2023-10-27 NOTE — ANESTHESIA POSTPROCEDURE EVALUATION
Patient: Katia Spears    Procedure Summary       Date: 10/27/23 Room / Location: [facility] OR [address] [place] [address] [facility]    Anesthesia Start: 0703 Anesthesia Stop: 0929    Procedure: Right reverse total shoulder arthroplasty (Right: Shoulder) Diagnosis:       Osteoarthritis of glenohumeral joint, right      (Osteoarthritis of glenohumeral joint, right [M19.011])    Surgeons: Saad Gorman MD Provider: Sumanth Payne MD    Anesthesia Type: general with block ASA Status: 3            Anesthesia Type: general with block    Vitals  Vitals Value Taken Time   /78 10/27/23 0930   Temp 36.3 °C (97.4 °F) 10/27/23 0925   Pulse 105 10/27/23 0933   Resp 16 10/27/23 0930   SpO2 96 % 10/27/23 0933   Vitals shown include unfiled device data.        Post Anesthesia Care and Evaluation    Patient location during evaluation: bedside  Patient participation: complete - patient participated  Level of consciousness: awake and alert  Pain management: adequate    Airway patency: patent  Anesthetic complications: No anesthetic complications  PONV Status: controlled  Cardiovascular status: blood pressure returned to baseline and acceptable  Respiratory status: acceptable  Hydration status: acceptable

## 2023-10-27 NOTE — ANESTHESIA PROCEDURE NOTES
Airway  Urgency: elective    Date/Time: 10/27/2023 7:13 AM  Airway not difficult    General Information and Staff    Patient location during procedure: OR  Anesthesiologist: Sumanth Payne MD  CRNA/CAA: Nataly Esparza CRNA    Indications and Patient Condition  Indications for airway management: airway protection    Preoxygenated: yes  MILS not maintained throughout  Mask difficulty assessment: 1 - vent by mask    Final Airway Details  Final airway type: endotracheal airway      Successful airway: ETT  Cuffed: yes   Successful intubation technique: direct laryngoscopy  Facilitating devices/methods: intubating stylet  Endotracheal tube insertion site: oral  Blade: Kathy  Blade size: 3  ETT size (mm): 7.0  Cormack-Lehane Classification: grade IIa - partial view of glottis  Placement verified by: chest auscultation and capnometry   Cuff volume (mL): 8  Measured from: teeth  ETT/EBT  to teeth (cm): 20  Number of attempts at approach: 1  Assessment: lips, teeth, and gum same as pre-op and atraumatic intubation

## 2023-10-27 NOTE — ANESTHESIA PROCEDURE NOTES
Peripheral Block    Pre-sedation assessment completed: 10/27/2023 6:24 AM    Patient reassessed immediately prior to procedure    Patient location during procedure: pre-op  Start time: 10/27/2023 6:27 AM  Stop time: 10/27/2023 6:35 AM  Reason for block: at surgeon's request and post-op pain management  Performed by  Anesthesiologist: Sumanth Payne MD  Preanesthetic Checklist  Completed: patient identified, IV checked, site marked, risks and benefits discussed, surgical consent, monitors and equipment checked, pre-op evaluation and timeout performed  Prep:  Pt Position: supine  Sterile barriers:cap, gloves and mask  Prep: ChloraPrep  Patient monitoring: blood pressure monitoring, continuous pulse oximetry and EKG  Procedure    Sedation: yes  Performed under: local infiltration  Guidance:ultrasound guided    ULTRASOUND INTERPRETATION.  Using ultrasound guidance a 21 G gauge needle was placed in close proximity to the nerve, at which point, under ultrasound guidance anesthetic was injected in the area of the nerve and spread of the anesthesia was seen on ultrasound in close proximity thereto.  There were no abnormalities seen on ultrasound; a digital image was taken; and the patient tolerated the procedure with no complications. Images:still images obtained, printed/placed on chart    Laterality:right  Block Type:interscalene  Injection Technique:single-shot  Needle Type:echogenic and Tuohy  Needle Gauge:21 G  Resistance on Injection: none    Medications Used: dexamethasone (DECADRON) injection - Injection   4 mg - 10/27/2023 6:27:00 AM  ropivacaine (NAROPIN) 0.5 % injection - Injection   20 mL - 10/27/2023 6:27:00 AM      Post Assessment  Injection Assessment: negative aspiration for heme, no paresthesia on injection and incremental injection  Patient Tolerance:comfortable throughout block  Complications:no  Additional Notes  Ultrasound guidance used to visualize nerve anatomy, guide needle placement and verify  local anesthetic disbursement.

## 2023-10-27 NOTE — H&P
History & Physical       Patient: Katia Spears    Date of Admission: 10/27/2023  5:26 AM    YOB: 1960    Medical Record Number: [id number]    Attending Physician: Saad Gorman MD        Chief Complaints: Osteoarthritis of glenohumeral joint, right [M19.011]      History of Present Illness: 63 y.o. female presents with Osteoarthritis of glenohumeral joint, right [M19.011]. Onset of symptoms was gradual over past 2 years.  Symptoms are associated with pain and limited ROM.  Symptoms are aggravated by active abduction of the shoulder.   Symptoms improve with resting the arm by the side. Patient is now being admitted to the services of Saad Gorman MD for further evaluation and treatment.      No Known Allergies      Home Medications:  Medications Prior to Admission   Medication Sig Dispense Refill Last Dose    acetaminophen (TYLENOL) 500 MG tablet Take 1 tablet by mouth Every 6 (Six) Hours As Needed for Mild Pain.   10/26/2023    baclofen (LIORESAL) 10 MG tablet TAKE 1 TABLET BY MOUTH THREE TIMES DAILY (Patient taking differently: Take 1 tablet by mouth 3 (Three) Times a Day.) 270 tablet 1 10/27/2023 at 0415    cetirizine (zyrTEC) 10 MG tablet Take 1 tablet by mouth Every Night.   10/26/2023    clonazePAM (KlonoPIN) 1 MG tablet Take 2 tablets by mouth Every Night. 60 tablet 5 10/26/2023    DULoxetine (CYMBALTA) 60 MG capsule TAKE 1 CAPSULE BY MOUTH EVERY NIGHT 90 capsule 1 10/26/2023    gabapentin (NEURONTIN) 300 MG capsule Take 1 capsule by mouth 3 (Three) Times a Day As Needed (fibromyalgia). 270 capsule 1 10/27/2023 at 0415    ibuprofen (ADVIL,MOTRIN) 200 MG tablet Take 3-4 tablets by mouth Every 6 (Six) Hours As Needed for Mild Pain.   10/26/2023    linaclotide (Linzess) 145 MCG capsule capsule Take 1 capsule by mouth Every Morning Before Breakfast. 90 capsule 1 10/26/2023    lisinopril-hydrochlorothiazide (PRINZIDE,ZESTORETIC) 10-12.5 MG per tablet TAKE 1 TABLET BY MOUTH DAILY (Patient taking  differently: Take 1 tablet by mouth Every Morning.) 90 tablet 1 10/26/2023    montelukast (SINGULAIR) 10 MG tablet TAKE 1 TABLET BY MOUTH EVERY NIGHT 90 tablet 1 10/26/2023    pantoprazole (PROTONIX) 20 MG EC tablet TAKE 1 TABLET BY MOUTH DAILY (Patient taking differently: Take 1 tablet by mouth Every Morning.) 90 tablet 0 10/27/2023 at 0415    simvastatin (ZOCOR) 20 MG tablet TAKE 1 TABLET BY MOUTH EVERY NIGHT 90 tablet 1 10/26/2023    alendronate (FOSAMAX) 70 MG tablet TAKE 1 TABLET BY MOUTH 1 TIME WEEKLY       Ascorbic Acid (Vitamin C) 500 MG capsule Take 500 mg by mouth Daily.   10/19/2023    Diclofenac Sodium (VOLTAREN) 1 % gel gel Apply 4 g topically to the appropriate area as directed 4 (Four) Times a Day As Needed.   10/19/2023    fluticasone (FLONASE) 50 MCG/ACT nasal spray 1 spray into the nostril(s) as directed by provider As Needed.   More than a month    Magnesium 500 MG tablet Take 1 tablet by mouth Daily.   10/19/2023    tretinoin (RETIN-A) 0.05 % cream APPLY A THIN LAYER EXTERNALLY TO THE AFFECTED AREA OF FACE EVERY NIGHT AT BEDTIME AS TOLERATED   10/25/2023    Vitamin D, Cholecalciferol, 50 MCG (2000 UT) capsule Take 1 capsule by mouth Daily.   10/19/2023    Zinc 50 MG tablet Take 1 tablet by mouth Daily.   10/19/2023       Current Medications:  Scheduled Meds:bupivacaine liposome, 20 mL, Injection, Once  ceFAZolin, 2 g, Intravenous, Once  lactated ringers, 500 mL, Intravenous, Once  ropivacaine 0.5 % 50 mL, Morphine 5 mg, ketorolac 30 mg, EPINEPHrine 1 mg/mL 0.3 mg in sodium chloride 0.9 % 50 mL solution, , Injection, Once  sodium chloride, 3 mL, Intravenous, Q12H  vancomycin, 15 mg/kg, Intravenous, Once      Continuous Infusions:lactated ringers, 9 mL/hr      PRN Meds:.  lidocaine    midazolam    sodium chloride       Past Medical History:   Diagnosis Date    Anemia, unspecified     Anxiety     Arthritis     Arthritis of back 2022    Asthma     Chronic obstructive pulmonary disease, unspecified      Chronic sinusitis, unspecified     Constipation     Deviated nasal septum     Dysthymic disorder     Essential (primary) hypertension     Fibromyalgia     Fracture of wrist     Frozen shoulder     Generalized anxiety disorder     GERD (gastroesophageal reflux disease)     Hypo-osmolality and hyponatremia     Knee swelling     Mixed hyperlipidemia     Obstructive sleep apnea (adult) (pediatric)     cpap    Osteopenia     Osteoporosis     Other difficulties with micturition     Other long term (current) drug therapy     Periarthritis of shoulder     PLMD (periodic limb movement disorder)     Pneumonia 2022    PONV (postoperative nausea and vomiting)     RLS (restless legs syndrome)     Rotator cuff syndrome     Scoliosis 2000    Tear of meniscus of knee     Urinary incontinence in female     Vitamin D deficiency, unspecified         Past Surgical History:   Procedure Laterality Date     SECTION      X2    CHOLECYSTECTOMY      COLONOSCOPY      COLONOSCOPY N/A 2022    Procedure: COLONOSCOPY WITH BIOPSIES, POLYPECTOMIES;  Surgeon: Esha Garcia MD;  Location: [facility];  Service: Gastroenterology;  Laterality: N/A;  COLON POLYPS    DILATATION AND CURETTAGE      MULTIPLE    ENDOMETRIAL ABLATION  2010    ENDOSCOPY N/A 2022    Procedure: ESOPHAGOGASTRODUODENOSCOPY WITH BIOPSIES;  Surgeon: Esha Garcia MD;  Location: [facility];  Service: Gastroenterology;  Laterality: N/A;  HIATAL HERNIA    KNEE SURGERY Right     torn meniscus    LEEP  2002    OVARY SURGERY Right     TRIGGER POINT INJECTION      WRIST FRACTURE SURGERY Right 2019    hardware        Social History     Occupational History    Occupation: Homemaker   Tobacco Use    Smoking status: Former     Packs/day: 1.00     Years: 18.00     Additional pack years: 0.00     Total pack years: 18.00     Types: Cigarettes     Quit date: 1996     Years since quittin.8    Smokeless tobacco: Never    Vaping Use    Vaping Use: Never used   Substance and Sexual Activity    Alcohol use: Yes     Comment: 1-2 monthly    Drug use: Never    Sexual activity: Defer      Social History     Social History Narrative    Not on file        Family History   Problem Relation Age of Onset    Other Mother         Multiple System Atrophy    Lung cancer Father     Cancer Father         Lung    Hyperlipidemia Sister     Diabetes type II Sister     Thyroid disease Sister     Rheum arthritis Sister     Sleep apnea Sister     Insomnia Sister     Hyperlipidemia Brother     Schizophrenia Brother     Sleep walking Brother     Diabetes type I Son     Coronary artery disease Other     Cancer Maternal Aunt         Breast    Cancer Maternal Aunt         Breast    Cancer Paternal Aunt         Breast    Diabetes Sister         Type 2    Rheumatologic disease Sister     Malig Hyperthermia Neg Hx          Review of Systems:   HEENT: Patient denies any headaches, vision changes, change in hearing, or tinnitus. Patient denies any rhinorrhea,epistaxis, sinus pain, mouth or dental problems, sore throat or hoarseness, or dysphagia  Pulmonary: Patient denies any cough, congestion, SOA, or wheezing  Cardiovascular: Patient denies any chest pain, dyspnea, palpitations, weakness, intolerance of exercise, varicosities, swelling of extremities, known murmur  Gastrointestinal:  Patient denies nausea, vomiting, diarrhea, constipation, loss  of appetite, change in appetite, dysphagia, gas, heartburn, melena, change in bowel habits, use of laxatives or other drugs to alter the function of the gastrointestinal tract.  Genital/Urinary: Patient denies dysuria, change in color of urine, change in frequency of urination, pain with urgency, incontinence, retention, or nocturia.  Musculoskeletal: Patient denies increased warmth; redness; or swelling of joints; limitation of function; deformity; crepitation: pain in a joint or an extremity, the neck, or the back,  especially with movement.  Neurological: Patient denies dizziness, tremor, ataxia, difficulty in speaking, change in speech, paresthesia, loss of sensation, seizures, syncope, changes in memory.  Endocrine system: Patient denies tremors, palpitations, intolerance of heat or cold, polyuria, polydipsia, polyphagia, diaphoresis, exophthalmos, or goiter.  Psychological: Patient denies thoughts/plans of harming self or other; depression,  insomnia, night terrors, darren, memory loss, disorientation.  Skin: Patient denies any bruising, rashes, discoloration, pruritus, wounds, ulcers, decubiti, changes in the hair or nails  Hematopoietic: Patient denies history of spontaneous or excessive bleeding, epistaxis, hematuria, melena, fatigue, enlarged or tender lymph nodes, pallor, history of anemia.    Physical Exam: 63 y.o. female  Vitals:    10/27/23 0630 10/27/23 0633 10/27/23 0636 10/27/23 0637   BP:    130/80   BP Location:       Patient Position:       Pulse: 92 85 91 89   Resp:    16   Temp:       TempSrc:       SpO2: 100% 100% 96% 98%       General Appearance:          Alert, cooperative, in no acute distress                                                 Head:    Normocephalic, without obvious abnormality, atraumatic   Eyes:            Lids and lashes normal, conjunctivae and sclerae normal, no   icterus, no pallor, corneas clear, PERRLA   Ears:    Ears appear intact with no abnormalities noted   Throat:   No oral lesions, no thrush, oral mucosa moist   Neck:   No adenopathy, supple, trachea midline, no thyromegaly, no   carotid bruit, no JVD   Back:     No kyphosis present, no scoliosis present, no skin lesions,      erythema or scars, no tenderness to percussion or                   palpation,   range of motion normal   Lungs:     Clear to auscultation,respirations regular, even and                  unlabored    Heart:    Regular rhythm and normal rate, normal S1 and S2, no            murmur, no gallop, no rub, no  click   Chest Wall:    No abnormalities observed   Abdomen:     Normal bowel sounds, no masses, no organomegaly, soft        nontender, nondistended, no guarding, no rebound                tenderness   Rectal:     Deferred   Extremities:   Tenderness over lateral aspect of the shoulder  . Moves all extremities well, no edema,   no cyanosis, no redness   Pulses:   Pulses palpable and equal bilaterally   Skin:   No bleeding, bruising or rash   Lymph nodes:   No palpable adenopathy   Neurologic:   Cranial nerves 2 - 12 grossly intact, sensation intact, DTR       present and equal bilaterally           Diagnostic Tests:  No visits with results within 2 Day(s) from this visit.   Latest known visit with results is:   Pre-Admission Testing on 10/20/2023   Component Date Value Ref Range Status    QT Interval 10/20/2023 370  ms Final    QTC Interval 10/20/2023 440  ms Final    Glucose 10/20/2023 95  65 - 99 mg/dL Final    BUN 10/20/2023 11  8 - 23 mg/dL Final    Creatinine 10/20/2023 0.91  0.57 - 1.00 mg/dL Final    Sodium 10/20/2023 138  136 - 145 mmol/L Final    Potassium 10/20/2023 4.4  3.5 - 5.2 mmol/L Final    Slight hemolysis detected by analyzer. Results may be affected.    Chloride 10/20/2023 102  98 - 107 mmol/L Final    CO2 10/20/2023 27.7  22.0 - 29.0 mmol/L Final    Calcium 10/20/2023 9.8  8.6 - 10.5 mg/dL Final    BUN/Creatinine Ratio 10/20/2023 12.1  7.0 - 25.0 Final    Anion Gap 10/20/2023 8.3  5.0 - 15.0 mmol/L Final    eGFR 10/20/2023 70.2  >60.0 mL/min/1.73 Final    WBC 10/20/2023 4.16  3.40 - 10.80 10*3/mm3 Final    RBC 10/20/2023 4.37  3.77 - 5.28 10*6/mm3 Final    Hemoglobin 10/20/2023 12.7  12.0 - 15.9 g/dL Final    Hematocrit 10/20/2023 37.7  34.0 - 46.6 % Final    MCV 10/20/2023 86.3  79.0 - 97.0 fL Final    MCH 10/20/2023 29.1  26.6 - 33.0 pg Final    MCHC 10/20/2023 33.7  31.5 - 35.7 g/dL Final    RDW 10/20/2023 12.1 (L)  12.3 - 15.4 % Final    RDW-SD 10/20/2023 38.1  37.0 - 54.0 fl Final    MPV  10/20/2023 10.0  6.0 - 12.0 fL Final    Platelets 10/20/2023 393  140 - 450 10*3/mm3 Final     No results found.      Assessment:    Osteoarthritis of glenohumeral joint, right        Plan:  The patient voiced understanding of the risks, benefits, and alternative forms of treatment that were discussed and the patient consents to proceed with right reverse total shoulder replacement.   The patient was seen today for preoperative discussion.  The patient has been tried on over-the-counter and prescription NSAID's despite the risks of anti-inflammatory bleeding, peptic ulcers and erosive gastritis with short term benefit only.  Braces have been prescribed for mechanical support.  Patient has been participating in an exercise program specifically targeting joint pain relief with limited benefit. Intraarticular injections have been used periodically with some but not complete relief of pain.  Ambulation aids have also been utilized.      The details of the surgical procedure were explained including the location of probable incisions and a description of the likely hardware/grafts to be used. The patient understands the likely convalescence after surgery as well as the rehabilitation required.  Also, we have thoroughly discussed with the patient the risks, benefits and alternatives to surgery.  Risks include but are not limited to the risk of infection, joint stiffness, limited range of motion, wound healing problems, scar tissue build up, myocardial infarction, stroke, blood clots (including DVT and/or pulmonary embolus along with the risk of death) neurologic and/or vascular injury, limb length discrepancy, fracture, dislocation, nonunion, malunion, continued pain and need for further surgery including hardware failure requiring revision.      Discharge Plan: tomorrow to home and home health      Date: 10/27/2023    Saad Gorman MD      DICTATED UTILIZING DRAGON DICTATION

## 2023-10-27 NOTE — CONSULTS
Patient Name:  Katia Spears  YOB: 1960  MRN:  [id number]  Date of Admission:  10/27/2023  Date of Consult:  10/27/2023  Patient Care Team:  Esha Galeana APRN as PCP - General (Nurse Practitioner)  Caren Nagel MD as Consulting Physician (Obstetrics and Gynecology)  Evangelina Carlin APRN as Nurse Practitioner (Nurse Practitioner)  Lanre Lin DO (Orthopedic Surgery)  Nani Bailey MD as Consulting Physician (Pulmonary Disease)  Kip Beltran MD as Consulting Physician (Sleep Medicine)    Inpatient Hospitalist Consult  Consult performed by: Adilson Tadeo MD  Consult ordered by: Saad Gorman MD  Reason for consult: Evaluate status and make recommendations regarding treatment for COPD and hypertension.        Subjective   History of Present Illness  Ms. Spears is a 63 y.o. female that has been admitted to [facility] following elective Right reverse total shoulder arthroplasty.  She has been admitted to an orthopedic floor following surgery and we were asked to see and assist with her medical problems, specifically relating to her hypertension, COPD and sleep apnea.  At the time of my visit she denies any chest pain, SOA, nausea, vomiting or diarrhea.  She has tolerated a diet.  She does complain of expected postoperative discomfort.  She reports being in a normal state of health leading up to surgery.      Past Medical History:   Diagnosis Date    Anemia, unspecified     Anxiety     Arthritis     Arthritis of back 2022    Asthma     Chronic obstructive pulmonary disease, unspecified     Chronic sinusitis, unspecified     Constipation     Deviated nasal septum     Dysthymic disorder     Essential (primary) hypertension     Fibromyalgia     Fracture of wrist 2018    Frozen shoulder     Generalized anxiety disorder     GERD (gastroesophageal reflux disease)     Hypo-osmolality and hyponatremia     Knee swelling     Mixed hyperlipidemia     Obstructive sleep  apnea (adult) (pediatric)     cpap    Osteopenia     Osteoporosis     Other difficulties with micturition     Other long term (current) drug therapy     Periarthritis of shoulder     PLMD (periodic limb movement disorder)     Pneumonia 2022    PONV (postoperative nausea and vomiting)     RLS (restless legs syndrome)     Rotator cuff syndrome     Scoliosis     Tear of meniscus of knee     Urinary incontinence in female     Vitamin D deficiency, unspecified      Past Surgical History:   Procedure Laterality Date     SECTION      X2    CHOLECYSTECTOMY      COLONOSCOPY      COLONOSCOPY N/A 2022    Procedure: COLONOSCOPY WITH BIOPSIES, POLYPECTOMIES;  Surgeon: Esha Garcia MD;  Location: [facility] ENDOSCOPY;  Service: Gastroenterology;  Laterality: N/A;  COLON POLYPS    DILATATION AND CURETTAGE      MULTIPLE    ENDOMETRIAL ABLATION      ENDOSCOPY N/A 2022    Procedure: ESOPHAGOGASTRODUODENOSCOPY WITH BIOPSIES;  Surgeon: Esha Garcia MD;  Location: [facility] ENDOSCOPY;  Service: Gastroenterology;  Laterality: N/A;  HIATAL HERNIA    KNEE SURGERY Right     torn meniscus    LEEP  2002    OVARY SURGERY Right     TRIGGER POINT INJECTION      WRIST FRACTURE SURGERY Right 2019    hardware     Family History   Problem Relation Age of Onset    Other Mother         Multiple System Atrophy    Lung cancer Father     Cancer Father         Lung    Hyperlipidemia Sister     Diabetes type II Sister     Thyroid disease Sister     Rheum arthritis Sister     Sleep apnea Sister     Insomnia Sister     Hyperlipidemia Brother     Schizophrenia Brother     Sleep walking Brother     Diabetes type I Son     Coronary artery disease Other     Cancer Maternal Aunt         Breast    Cancer Maternal Aunt         Breast    Cancer Paternal Aunt         Breast    Diabetes Sister         Type 2    Rheumatologic disease Sister     Malig Hyperthermia Neg Hx      Social History     Tobacco Use     Smoking status: Former     Packs/day: 1.00     Years: 18.00     Additional pack years: 0.00     Total pack years: 18.00     Types: Cigarettes     Quit date: 1996     Years since quittin.8    Smokeless tobacco: Never   Vaping Use    Vaping Use: Never used   Substance Use Topics    Alcohol use: Yes     Comment: 1-2 monthly    Drug use: Never     Medications Prior to Admission   Medication Sig Dispense Refill Last Dose    acetaminophen (TYLENOL) 500 MG tablet Take 1 tablet by mouth Every 6 (Six) Hours As Needed for Mild Pain.   10/26/2023    baclofen (LIORESAL) 10 MG tablet TAKE 1 TABLET BY MOUTH THREE TIMES DAILY (Patient taking differently: Take 1 tablet by mouth 3 (Three) Times a Day.) 270 tablet 1 10/27/2023 at 0415    cetirizine (zyrTEC) 10 MG tablet Take 1 tablet by mouth Every Night.   10/26/2023    clonazePAM (KlonoPIN) 1 MG tablet Take 2 tablets by mouth Every Night. 60 tablet 5 10/26/2023    DULoxetine (CYMBALTA) 60 MG capsule TAKE 1 CAPSULE BY MOUTH EVERY NIGHT 90 capsule 1 10/26/2023    gabapentin (NEURONTIN) 300 MG capsule Take 1 capsule by mouth 3 (Three) Times a Day As Needed (fibromyalgia). 270 capsule 1 10/27/2023 at 0415    ibuprofen (ADVIL,MOTRIN) 200 MG tablet Take 3-4 tablets by mouth Every 6 (Six) Hours As Needed for Mild Pain.   10/26/2023    linaclotide (Linzess) 145 MCG capsule capsule Take 1 capsule by mouth Every Morning Before Breakfast. 90 capsule 1 10/26/2023    lisinopril-hydrochlorothiazide (PRINZIDE,ZESTORETIC) 10-12.5 MG per tablet TAKE 1 TABLET BY MOUTH DAILY (Patient taking differently: Take 1 tablet by mouth Every Morning.) 90 tablet 1 10/26/2023    montelukast (SINGULAIR) 10 MG tablet TAKE 1 TABLET BY MOUTH EVERY NIGHT 90 tablet 1 10/26/2023    pantoprazole (PROTONIX) 20 MG EC tablet TAKE 1 TABLET BY MOUTH DAILY (Patient taking differently: Take 1 tablet by mouth Every Morning.) 90 tablet 0 10/27/2023 at 0415    simvastatin (ZOCOR) 20 MG tablet TAKE 1 TABLET BY MOUTH EVERY  NIGHT 90 tablet 1 10/26/2023    alendronate (FOSAMAX) 70 MG tablet TAKE 1 TABLET BY MOUTH 1 TIME WEEKLY       Ascorbic Acid (Vitamin C) 500 MG capsule Take 500 mg by mouth Daily.   10/19/2023    fluticasone (FLONASE) 50 MCG/ACT nasal spray 1 spray into the nostril(s) as directed by provider As Needed.   More than a month    Magnesium 500 MG tablet Take 1 tablet by mouth Daily.   10/19/2023    tretinoin (RETIN-A) 0.05 % cream APPLY A THIN LAYER EXTERNALLY TO THE AFFECTED AREA OF FACE EVERY NIGHT AT BEDTIME AS TOLERATED   10/25/2023    Vitamin D, Cholecalciferol, 50 MCG (2000 UT) capsule Take 1 capsule by mouth Daily.   10/19/2023    Zinc 50 MG tablet Take 1 tablet by mouth Daily.   10/19/2023    [DISCONTINUED] Diclofenac Sodium (VOLTAREN) 1 % gel gel Apply 4 g topically to the appropriate area as directed 4 (Four) Times a Day As Needed.   10/19/2023     Allergies:  Patient has no known allergies.    Review of Systems   Constitutional: Negative.    HENT: Negative.     Respiratory: Negative.     Cardiovascular: Negative.    Gastrointestinal: Negative.    Endocrine: Negative.    Genitourinary: Negative.    Musculoskeletal:         Expected postoperative pain   Skin: Negative.    Neurological: Negative.    Hematological: Negative.    Psychiatric/Behavioral: Negative.         Objective      Vital Signs  Temp:  [96.9 °F (36.1 °C)-97.9 °F (36.6 °C)] 97.3 °F (36.3 °C)  Heart Rate:  [] 109  Resp:  [14-20] 14  BP: (112-154)/(71-97) 112/71  Body mass index is 33.52 kg/m².    Physical Exam  Vitals and nursing note reviewed.   Eyes:      General: No scleral icterus.  Cardiovascular:      Rate and Rhythm: Regular rhythm. Tachycardia present.   Pulmonary:      Effort: Pulmonary effort is normal.      Breath sounds: Normal breath sounds.   Abdominal:      General: Bowel sounds are normal.      Palpations: Abdomen is soft.      Tenderness: There is no abdominal tenderness.   Musculoskeletal:         General: Tenderness present.       Comments: Surgical wound dressed, ROM not tested   Skin:     General: Skin is warm and dry.      Findings: No rash.   Neurological:      Mental Status: She is alert. Mental status is at baseline.         Results Review:   I reviewed the patient's new clinical results.  I reviewed the patient's new imaging results and agree with the interpretation.  I reviewed the patient's other test results and agree with the interpretation         Assessment/Plan     Active Hospital Problems    Diagnosis  POA    **Osteoarthritis of glenohumeral joint, right [M19.011]  Unknown    Rotator cuff tear arthropathy of right shoulder [M75.101, M12.811]  Yes    Class 1 obesity [E66.9]  Yes    COPD (chronic obstructive pulmonary disease) [J44.9]  Yes    Fibromyalgia [M79.7]  Yes    HTN (hypertension) [I10]  Yes    JE (obstructive sleep apnea) [G47.33]  Yes      Resolved Hospital Problems   No resolved problems to display.       Ms. Spears is a 63 y.o. female who is s/p Right reverse total shoulder arthroplasty.    She seems to be doing well thus far postoperatively.   Most recent BP is 112/71.  Will hold HCTZ.  Anticipate fluctuations in BP due to blood loss, hypovolemia, anesthesia, narcotics etc.   Holding parameters have been written for lisinopril.   Continue IVFs as ordered.    Monitor renal function.  She has tachycardia that she says is typical for her.  No known cardiac issues.  Seems very regular on examination.  Preoperative EKG reviewed rate 85 at that time otherwise normal.  Likely hyperadrenergic following surgery.  We will just monitor heart rate for now.   Respiratory status stable, continue home inhalers.  She does not wear CPAP regularly at home.  She is on continuous pulse oximetry.  DVT prophylaxis per surgery.  She was encouraged to use incentive spirometer as instructed.      Thank you very much for asking A to be involved in this patient's care. We will follow along with you.      Adilson Tadeo MD  [facility]  10/27/23  15:53 EDT

## 2023-10-27 NOTE — OP NOTE
REVERSE TOTAL SHOULDER ARTHROPLASTY       PATIENT NAME:Katia Spears     YOB: 1960     ATTENDING PHYSICIAN: Saad Gorman MD     DATE OF PROCEDURE: 10/27/2023     PREOPERATIVE DIAGNOSIS: Osteoarthritis of glenohumeral joint, right [M19.011]    POSTOPERATIVE DIAGNOSIS: Post-Op Diagnosis Codes:     * Osteoarthritis of glenohumeral joint, right [M19.011]    PRINCIPAL DIAGNOSIS:  Severe degenerative osteoarthritis, right shoulder.    PROCEDURE: Right reverse total shoulder arthroplasty.    SURGEON:  Saad Gorman M.D.    ASSISTANT: ARSLAN Smith  was responsible for performing the following activities: Retraction, Suction, Irrigation, Suturing, Closing, and Placing Dressing and their skilled assistance was necessary for the success of this case.       ANESTHESIOLOGIST: Dr Sumanth Payne MD    ANESTHESIA: General with an LMA with a scalene block for postoperative pain control.    POSITION: Beach chair.    DRAINS: None.    COMPLICATIONS: None.    ESTIMATED BLOOD LOSS: 100 mL    SPECIMENS: * No orders in the log *    IMPLANT USED: A # 10 Kaya TM coated press-fit humeral stem with a +3 mm polyethylene insert, a 25 mm Mini-comprehensive base plate from [employer] for the glenoid with a 36 mm Glenosphere with a + 3 mm offset and 5 locking screws.      INDICATION: The patient has severe pain and discomfort in the right  shoulder with exquisite discomfort. Patient could not carry out activities of daily living and recreational activities. Risks and benefits of surgical intervention were discussed with the patient only after it was determined that conservative care would not provide them with adequate relief of symptoms.  All potential complications were discussed with the patient in great detail.     DETAILS OF PROCEDURE: Surgical timeout was called. Operative extremity was correctly identified in the operating room suite. Patient was placed under appropriate anesthetic.  The patient was placed in a beach  chair position.  All bony prominences were carefully padded and protected.  Patient was given antibiotics per the SCIP protocol. Extremity was prepped and draped in a standard fashion. A deltopectoral incision was carried out. The interval between the deltoid and the pectoralis major was developed. The cephalic vein was carefully protected and retracted laterally. The subscap was identified, traction sutures were placed for subsequent reattachment using FiberWire suture. The anterior capsulotomy was carried out. The anterior capsule was tagged for subsequent reattachment as well. The humeral head was subluxated anteriorly.The biceps tendon was tagged for subsequent reattachment. The anterior and inferior capsular release was carried out along the humeral neck. Intramedullary canal was identified and we started with a #8 reamer. We progressed on with 1 mm increments up to the point that there was a good intramedullary fit with the appropriate reamer. The appropriate # 10 humeral trial component was impacted into position and an excellent fit was obtained. Good metaphyseal fit was obtained along with diaphyseal fixation. Appropriate amounts of retroversion were built into the positioning of the component.     Retractors were placed around the glenoid posteriorly, anteriorly, and inferiorly. The glenoid labrum was resected. The stump of the long head of the biceps was resected superiorly. Articular remnants of the of articular cartilage were scarped off the glenoid. The central post pin was placed after careful gauging of the anterior, posterior, and inferior aspect of the glenoid. There was a slight interior tilt based into the placement of the pin. The central post was drilled followed by the placement of the pin. The central post was drilled followed by the placement of a peripheral soft tissue reamer. Bleeding subchondral bone was exposed in the base of the glenoid. A central post reamer was then used and the  cancellous bone was lavaged with bacitracin irrigating solution. Diluted Betadine solution was used as an antibiotic solution and a press-fit 28 mm glenoid component was impacted into position. The stability of the glenoid component was augmented with screws, one superoinferiorly, the other one posteroinferiorly. The glenosphere, 36 mm in diameter with a +3 mm lateral offset implant design was then impacted and locked onto the tamayo taper of the glenoid component.     Trial reduction was carried out.  The stability of the components was excellent. The limb lengths were checked and found to be accurate. There was no tendency towards dislocation of the components in any position of the prosthesis. The humerus was subluxated anteriorly. The humeral canal was lavaged with bacitracin irrigating solution and diluted Betadine was used as an antibiotic solution. The appropriate humeral stem, # 10 press-fit TM coated was then impacted and locked into position. Excellent rotational and axial stability were obtained. The trial reductions were carried out and then the appropriate polyethylene insert, + 3 mm in thickness was snapped and locked onto the humeral side. A final reduction was carried out. Stability was checked and found to be excellent. There was no tendency towards dislocation, and limb lengths were accurate. The anterior capsule was then repaired with interrupted sutures. The remnants of the rotator were reattached to build a soft tissue envelope to provide stability anteriorly and superiorly for the components. Deltopectorial interval was carefully approximated. Subcuticular sutures applied. An analgesic  cocktail was instilled into the subperiosteal tissues and intra-articularlys into the capsule for postoperative analgesia. Occlusive dressing was applied. Slingshot was applied to control the position of the extremity. Patient tolerated the procedure well. Sponge, gauze and needle count was correct. The patient  was reversed from anesthesia and taken from the operating room to the recovery room in stable condition. I discussed the satisfactory performance of this procedure with the patient’s family and answered all questions for them.      Saad Gorman MD  10/27/2023  09:15 EDT

## 2023-10-27 NOTE — PLAN OF CARE
Goal Outcome Evaluation:           Progress: improving  Outcome Evaluation: 64 y/o s/POD 0 R reverse TSA. AOX4. Pt up w/ assist x1 to chair. Voiding function intact via BRP. Neuro checks WNL, c/o RUE numbness d/t medical block. Pain management offered, declined. Sling and primaseal dsg c/d/i. PIV running LR at 75cc/hr. Needs met. VSS. RA currently. Educated pt on falls precautions. Plan to d/c home tomorrow following OT. WIll CTM.

## 2023-10-28 ENCOUNTER — READMISSION MANAGEMENT (OUTPATIENT)
Dept: CALL CENTER | Facility: HOSPITAL | Age: 63
End: 2023-10-28
Payer: MEDICARE

## 2023-10-28 VITALS
TEMPERATURE: 97.1 F | DIASTOLIC BLOOD PRESSURE: 85 MMHG | HEIGHT: 61 IN | OXYGEN SATURATION: 96 % | BODY MASS INDEX: 33.49 KG/M2 | HEART RATE: 93 BPM | WEIGHT: 177.4 LBS | RESPIRATION RATE: 16 BRPM | SYSTOLIC BLOOD PRESSURE: 135 MMHG

## 2023-10-28 LAB
ANION GAP SERPL CALCULATED.3IONS-SCNC: 10.7 MMOL/L (ref 5–15)
BUN SERPL-MCNC: 14 MG/DL (ref 8–23)
BUN/CREAT SERPL: 16.3 (ref 7–25)
CALCIUM SPEC-SCNC: 8.9 MG/DL (ref 8.6–10.5)
CHLORIDE SERPL-SCNC: 100 MMOL/L (ref 98–107)
CO2 SERPL-SCNC: 25.3 MMOL/L (ref 22–29)
CREAT SERPL-MCNC: 0.86 MG/DL (ref 0.57–1)
EGFRCR SERPLBLD CKD-EPI 2021: 75.1 ML/MIN/1.73
GLUCOSE SERPL-MCNC: 117 MG/DL (ref 65–99)
HCT VFR BLD AUTO: 33.7 % (ref 34–46.6)
HGB BLD-MCNC: 11.2 G/DL (ref 12–15.9)
POTASSIUM SERPL-SCNC: 4.3 MMOL/L (ref 3.5–5.2)
SODIUM SERPL-SCNC: 136 MMOL/L (ref 136–145)

## 2023-10-28 PROCEDURE — G0008 ADMIN INFLUENZA VIRUS VAC: HCPCS | Performed by: ORTHOPAEDIC SURGERY

## 2023-10-28 PROCEDURE — 25010000002 MORPHINE PER 10 MG: Performed by: ORTHOPAEDIC SURGERY

## 2023-10-28 PROCEDURE — G0378 HOSPITAL OBSERVATION PER HR: HCPCS

## 2023-10-28 PROCEDURE — 25010000002 INFLUENZA VAC HIGH-DOSE QUAD 0.7 ML SUSPENSION PREFILLED SYRINGE: Performed by: ORTHOPAEDIC SURGERY

## 2023-10-28 PROCEDURE — 97535 SELF CARE MNGMENT TRAINING: CPT

## 2023-10-28 PROCEDURE — 97165 OT EVAL LOW COMPLEX 30 MIN: CPT

## 2023-10-28 PROCEDURE — 85018 HEMOGLOBIN: CPT | Performed by: ORTHOPAEDIC SURGERY

## 2023-10-28 PROCEDURE — 90662 IIV NO PRSV INCREASED AG IM: CPT | Performed by: ORTHOPAEDIC SURGERY

## 2023-10-28 PROCEDURE — 85014 HEMATOCRIT: CPT | Performed by: ORTHOPAEDIC SURGERY

## 2023-10-28 PROCEDURE — 97162 PT EVAL MOD COMPLEX 30 MIN: CPT

## 2023-10-28 PROCEDURE — 97110 THERAPEUTIC EXERCISES: CPT

## 2023-10-28 PROCEDURE — 80048 BASIC METABOLIC PNL TOTAL CA: CPT | Performed by: ORTHOPAEDIC SURGERY

## 2023-10-28 PROCEDURE — 97530 THERAPEUTIC ACTIVITIES: CPT

## 2023-10-28 RX ORDER — OXYCODONE AND ACETAMINOPHEN 10; 325 MG/1; MG/1
2 TABLET ORAL EVERY 4 HOURS PRN
Status: DISCONTINUED | OUTPATIENT
Start: 2023-10-28 | End: 2023-10-28 | Stop reason: HOSPADM

## 2023-10-28 RX ORDER — DOCUSATE SODIUM 250 MG
250 CAPSULE ORAL 2 TIMES DAILY PRN
Qty: 30 CAPSULE | Refills: 1 | Status: SHIPPED | OUTPATIENT
Start: 2023-10-28

## 2023-10-28 RX ORDER — OXYCODONE AND ACETAMINOPHEN 10; 325 MG/1; MG/1
1-2 TABLET ORAL EVERY 4 HOURS PRN
Qty: 30 TABLET | Refills: 0 | Status: SHIPPED | OUTPATIENT
Start: 2023-10-28

## 2023-10-28 RX ORDER — ASPIRIN 81 MG/1
81 TABLET ORAL DAILY
Qty: 30 TABLET | Refills: 0 | Status: SHIPPED | OUTPATIENT
Start: 2023-10-28

## 2023-10-28 RX ORDER — OXYCODONE AND ACETAMINOPHEN 10; 325 MG/1; MG/1
1 TABLET ORAL EVERY 4 HOURS PRN
Status: DISCONTINUED | OUTPATIENT
Start: 2023-10-28 | End: 2023-10-28 | Stop reason: HOSPADM

## 2023-10-28 RX ADMIN — DOCUSATE SODIUM 100 MG: 100 CAPSULE, LIQUID FILLED ORAL at 09:29

## 2023-10-28 RX ADMIN — MORPHINE SULFATE 2 MG: 2 INJECTION, SOLUTION INTRAMUSCULAR; INTRAVENOUS at 06:29

## 2023-10-28 RX ADMIN — PANTOPRAZOLE SODIUM 40 MG: 40 TABLET, DELAYED RELEASE ORAL at 04:41

## 2023-10-28 RX ADMIN — ATORVASTATIN CALCIUM 10 MG: 20 TABLET, FILM COATED ORAL at 09:28

## 2023-10-28 RX ADMIN — OXYCODONE HYDROCHLORIDE AND ACETAMINOPHEN 1 TABLET: 5; 325 TABLET ORAL at 00:37

## 2023-10-28 RX ADMIN — ASPIRIN 81 MG: 81 TABLET, COATED ORAL at 09:29

## 2023-10-28 RX ADMIN — GABAPENTIN 300 MG: 300 CAPSULE ORAL at 04:47

## 2023-10-28 RX ADMIN — OXYCODONE HYDROCHLORIDE AND ACETAMINOPHEN 2 TABLET: 5; 325 TABLET ORAL at 04:41

## 2023-10-28 RX ADMIN — LISINOPRIL 10 MG: 10 TABLET ORAL at 09:29

## 2023-10-28 RX ADMIN — INFLUENZA A VIRUS A/VICTORIA/4897/2022 IVR-238 (H1N1) ANTIGEN (FORMALDEHYDE INACTIVATED), INFLUENZA A VIRUS A/DARWIN/9/2021 SAN-010 (H3N2) ANTIGEN (FORMALDEHYDE INACTIVATED), INFLUENZA B VIRUS B/PHUKET/3073/2013 ANTIGEN (FORMALDEHYDE INACTIVATED), AND INFLUENZA B VIRUS B/MICHIGAN/01/2021 ANTIGEN (FORMALDEHYDE INACTIVATED) 0.7 ML: 60; 60; 60; 60 INJECTION, SUSPENSION INTRAMUSCULAR at 11:31

## 2023-10-28 RX ADMIN — BACLOFEN 10 MG: 10 TABLET ORAL at 09:29

## 2023-10-28 NOTE — THERAPY EVALUATION
Patient Name: Katia Spears  : 1960    MRN: [id number]                              Today's Date: 10/28/2023       Admit Date: 10/27/2023    Visit Dx:     ICD-10-CM ICD-9-CM   1. Rotator cuff tear arthropathy of right shoulder  M75.101 716.81    M12.811    2. Osteoarthritis of glenohumeral joint, right  M19.011 715.91     Patient Active Problem List   Diagnosis    Anemia    Anxiety    COPD (chronic obstructive pulmonary disease)    Deviated nasal septum    Encounter for long-term (current) use of other medications    Fibromyalgia    HTN (hypertension)    JE (obstructive sleep apnea)    Personal history of tobacco use, presenting hazards to health    Generalized abdominal pain    Neck pain    Tachycardia    Constipation    Vitamin D deficiency    Mixed hyperlipidemia    Asthma    GERD (gastroesophageal reflux disease)    Nontraumatic complete tear of right rotator cuff    History of anemia    Other fatigue    Environmental allergies    Class 1 obesity    Osteoarthritis of glenohumeral joint, right    PLMD (periodic limb movement disorder)    Rotator cuff tear arthropathy of right shoulder     Past Medical History:   Diagnosis Date    Anemia, unspecified     Anxiety     Arthritis     Arthritis of back     Asthma     Chronic obstructive pulmonary disease, unspecified     Chronic sinusitis, unspecified     Constipation     Deviated nasal septum     Dysthymic disorder     Essential (primary) hypertension     Fibromyalgia     Fracture of wrist 2018    Frozen shoulder     Generalized anxiety disorder     GERD (gastroesophageal reflux disease)     Hypo-osmolality and hyponatremia     Knee swelling     Mixed hyperlipidemia     Obstructive sleep apnea (adult) (pediatric)     cpap    Osteopenia     Osteoporosis     Other difficulties with micturition     Other long term (current) drug therapy     Periarthritis of shoulder     PLMD (periodic limb movement disorder)     Pneumonia 2022    PONV (postoperative  nausea and vomiting)     RLS (restless legs syndrome)     Rotator cuff syndrome     Scoliosis     Tear of meniscus of knee     Urinary incontinence in female     Vitamin D deficiency, unspecified      Past Surgical History:   Procedure Laterality Date     SECTION      X2    CHOLECYSTECTOMY      COLONOSCOPY      COLONOSCOPY N/A 2022    Procedure: COLONOSCOPY WITH BIOPSIES, POLYPECTOMIES;  Surgeon: Esha Garcia MD;  Location: [facility] ENDOSCOPY;  Service: Gastroenterology;  Laterality: N/A;  COLON POLYPS    DILATATION AND CURETTAGE      MULTIPLE    ENDOMETRIAL ABLATION  2010    ENDOSCOPY N/A 2022    Procedure: ESOPHAGOGASTRODUODENOSCOPY WITH BIOPSIES;  Surgeon: Esha Garcia MD;  Location: [facility] ENDOSCOPY;  Service: Gastroenterology;  Laterality: N/A;  HIATAL HERNIA    KNEE SURGERY Right     torn meniscus    LEEP  2002    OVARY SURGERY Right     TRIGGER POINT INJECTION      WRIST FRACTURE SURGERY Right 2019    hardware      General Information       Row Name 10/28/23 1127          OT Time and Intention    Document Type evaluation;discharge evaluation/summary  -KG     Mode of Treatment individual therapy;occupational therapy  -KG       Row Name 10/28/23 1127          General Information    Patient Profile Reviewed yes  -KG     Prior Level of Function independent:;ADL's  -KG     Existing Precautions/Restrictions non-weight bearing  RUE  -KG       Row Name 10/28/23 1127          Living Environment    People in Home spouse  -KG       Row Name 10/28/23 1127          Cognition    Orientation Status (Cognition) oriented x 3  -KG               User Key  (r) = Recorded By, (t) = Taken By, (c) = Cosigned By      Initials Name Provider Type    KG Wojciech Guerra OT Occupational Therapist                     Mobility/ADL's       Row Name 10/28/23 1128          Transfers    Transfers sit-stand transfer;stand-sit transfer  -KG       Row Name 10/28/23 1128          Sit-Stand  Transfer    Sit-Stand [place] (Transfers) standby assist  -KG       Row Name 10/28/23 1128          Stand-Sit Transfer    Stand-Sit [place] (Transfers) standby assist  -KG       Row Name 10/28/23 1128          Activities of Daily Living    BADL Assessment/Intervention upper body dressing;lower body dressing  -KG       Row Name 10/28/23 1128          Mobility    Extremity Weight-bearing Status right upper extremity  -KG     Right Upper Extremity (Weight-bearing Status) non weight-bearing (NWB)  -KG       Row Name 10/28/23 1128          Upper Body Dressing Assessment/Training    [place] Level (Upper Body Dressing) don;front opening garment;maximum assist (25% patient effort)  -KG     Position (Upper Body Dressing) supported sitting  -KG       Row Name 10/28/23 1128          Lower Body Dressing Assessment/Training    [place] Level (Lower Body Dressing) don;pants/bottoms;minimum assist (75% patient effort)  -KG     Position (Lower Body Dressing) supported sitting  -KG               User Key  (r) = Recorded By, (t) = Taken By, (c) = Cosigned By      Initials Name Provider Type    KG Wojciech Guerra OT Occupational Therapist                   Obj/Interventions       Row Name 10/28/23 1129          Sensory Assessment (Somatosensory)    Sensory Assessment (Somatosensory) sensation intact  -KG       Row Name 10/28/23 1129          Vision Assessment/Intervention    Visual Impairment/Limitations WNL  -KG       Row Name 10/28/23 1129          Range of Motion Comprehensive    General Range of Motion --  LUE WFL; RUE wrist/hand/elbow WFL, shoulder NT  -KG       Row Name 10/28/23 1129          Strength Comprehensive (MMT)    General Manual Muscle Testing (MMT) Assessment --  LUE WFL; RUE NT 2/2 NWB  -KG       Row Name 10/28/23 1129          Shoulder (Therapeutic Exercise)    Shoulder (Therapeutic Exercise) pendulum exercises  -KG     Shoulder Pendulum Exercises (Therapeutic Exercise) right;standing  -KG        Row Name 10/28/23 1129          Elbow/Forearm (Therapeutic Exercise)    Elbow/Forearm (Therapeutic Exercise) AROM (active range of motion)  -KG     Elbow/Forearm AROM (Therapeutic Exercise) right;flexion;extension;supination;pronation;10 repetitions  -KG       Row Name 10/28/23 1129          Wrist (Therapeutic Exercise)    Wrist (Therapeutic Exercise) AROM (active range of motion)  -KG     Wrist AROM (Therapeutic Exercise) right;flexion;extension;10 repetitions  -KG       Row Name 10/28/23 1129          Hand (Therapeutic Exercise)    Hand (Therapeutic Exercise) AROM (active range of motion)  -KG     Hand AROM/AAROM (Therapeutic Exercise) right;finger flexion;finger extension;10 repetitions  -KG       Row Name 10/28/23 1129          Motor Skills    Therapeutic Exercise shoulder;elbow/forearm;wrist;hand  -KG       Row Name 10/28/23 1129          Balance    Balance Assessment sit to stand dynamic balance  -KG     Sit to Stand Dynamic Balance standby assist  -KG     Balance Interventions sitting;standing;static;dynamic;occupation based/functional task  -KG               User Key  (r) = Recorded By, (t) = Taken By, (c) = Cosigned By      Initials Name Provider Type    KG Wojciech Guerra OT Occupational Therapist                   Goals/Plan       Row Name 10/28/23 1139          Dressing Goal 1 (OT)    Activity/Device (Dressing Goal 1, OT) lower body dressing  -KG     [place]/Cues Needed (Dressing Goal 1, OT) minimum assist (75% or more patient effort)  -KG     Time Frame (Dressing Goal 1, OT) short term goal (STG);2 weeks  -KG     Progress/Outcome (Dressing Goal 1, OT) goal met  -KG               User Key  (r) = Recorded By, (t) = Taken By, (c) = Cosigned By      Initials Name Provider Type    Wojciech Harris OT Occupational Therapist                   Clinical Impression       Row Name 10/28/23 1132          Pain Assessment    Pretreatment Pain Rating 4/10  -KG     Posttreatment Pain Rating 4/10  -KG     Pain  Location - Side/Orientation Right  -KG     Pain Location - shoulder  -KG     Pain Intervention(s) Repositioned  -KG       Row Name 10/28/23 1132          Plan of Care Review    Plan of Care Reviewed With patient;spouse  -KG     Outcome Evaluation PT is POD1 R rTSA. Reports being (I) w/ BADLs at baseline, however  will provide assistance at d/c.  present for education/training 2/2 patient lethargic from pain medication. Educated on precautions s/p rTSA and maintaining NWB RUE. Performed 1 x 10 shoulder pendulums & elbow/wrist/hand AROM. Pt w/ difficulty performing exercises 2/2 lethargy, however  verbalized understanding of HEP and precautions to assist at d/c. Requiring Max A for UBD & Min A for LBD. SBA<>CGA for STS transfers. Plans to d/c home w/ increased assistance.  -KG       Row Name 10/28/23 1132          Therapy Assessment/Plan (OT)    Therapy Frequency (OT) evaluation only  -KG       Row Name 10/28/23 1132          Therapy Plan Review/Discharge Plan (OT)    Anticipated Discharge Disposition (OT) home with assist  -KG       Row Name 10/28/23 1132          Vital Signs    Pre Patient Position Sitting  -KG     Intra Patient Position Standing  -KG     Post Patient Position Sitting  -KG       Row Name 10/28/23 1132          Positioning and Restraints    Pre-Treatment Position sitting in chair/recliner  -KG     Post Treatment Position chair  -KG     In Chair notified nsg;reclined;call light within reach;encouraged to call for assist;with family/caregiver;exit alarm on  -KG               User Key  (r) = Recorded By, (t) = Taken By, (c) = Cosigned By      Initials Name Provider Type    Wojciech Harris, OT Occupational Therapist                   Outcome Measures       Row Name 10/28/23 4743          How much help from another is currently needed...    Putting on and taking off regular lower body clothing? 3  -KG     Bathing (including washing, rinsing, and drying) 2  -KG     Toileting (which  includes using toilet bed pan or urinal) 3  -KG     Putting on and taking off regular upper body clothing 2  -KG     Taking care of personal grooming (such as brushing teeth) 3  -KG     Eating meals 3  -KG     AM-PAC 6 Clicks Score (OT) 16  -KG       Row Name 10/28/23 0750          How much help from another person do you currently need...    Turning from your back to your side while in flat bed without using bedrails? 3  -LD     Moving from lying on back to sitting on the side of a flat bed without bedrails? 3  -LD     Moving to and from a bed to a chair (including a wheelchair)? 3  -LD     Standing up from a chair using your arms (e.g., wheelchair, bedside chair)? 3  -LD     Climbing 3-5 steps with a railing? 2  -LD     To walk in hospital room? 3  -LD     AM-PAC 6 Clicks Score (PT) 17  -LD     Highest level of mobility 5 --> Static standing  -LD       Row Name 10/28/23 1139          Modified Spal[place] Scale    Modified Spal[place] Scale 2 - Slight disability.  Unable to carry out all previous activities but able to look after own affairs without assistance.  -KG       Row Name 10/28/23 1139          Functional Assessment    Outcome Measure Options AM-PAC 6 Clicks Daily Activity (OT);Modified [place]  -KG               User Key  (r) = Recorded By, (t) = Taken By, (c) = Cosigned By      Initials Name Provider Type    Esha Franks, RN Registered Nurse    Wojciech Harris OT Occupational Therapist                    Occupational Therapy Education       Title: PT OT SLP Therapies (Not Started)       Topic: Occupational Therapy (Not Started)       Point: ADL training (Not Started)       Description:   Instruct learner(s) on proper safety adaptation and remediation techniques during self care or transfers.   Instruct in proper use of assistive devices.                  Learner Progress:  Not documented in this visit.              Point: Home exercise program (Not Started)       Description:   Instruct learner(s) on  appropriate technique for monitoring, assisting and/or progressing therapeutic exercises/activities.                  Learner Progress:  Not documented in this visit.              Point: Precautions (Not Started)       Description:   Instruct learner(s) on prescribed precautions during self-care and functional transfers.                  Learner Progress:  Not documented in this visit.              Point: Body mechanics (Not Started)       Description:   Instruct learner(s) on proper positioning and spine alignment during self-care, functional mobility activities and/or exercises.                  Learner Progress:  Not documented in this visit.                                  OT Recommendation and Plan  Therapy Frequency (OT): evaluation only  Plan of Care Review  Plan of Care Reviewed With: patient, spouse  Outcome Evaluation: PT is POD1 R rTSA. Reports being (I) w/ BADLs at baseline, however  will provide assistance at d/c.  present for education/training 2/2 patient lethargic from pain medication. Educated on precautions s/p rTSA and maintaining NWB RUE. Performed 1 x 10 shoulder pendulums & elbow/wrist/hand AROM. Pt w/ difficulty performing exercises 2/2 lethargy, however  verbalized understanding of HEP and precautions to assist at d/c. Requiring Max A for UBD & Min A for LBD. SBA<>CGA for STS transfers. Plans to d/c home w/ increased assistance.     Time Calculation:   Evaluation Complexity (OT)  Review Occupational Profile/Medical/Therapy History Complexity: brief/low complexity  Assessment, Occupational Performance/Identification of Deficit Complexity: 1-3 performance deficits  Clinical Decision Making Complexity (OT): problem focused assessment/low complexity  Overall Complexity of Evaluation (OT): low complexity     Time Calculation- OT       Row Name 10/28/23 1140             Time Calculation- OT    OT Start Time 0800  -KG      OT Stop Time 0840  -KG      OT Time Calculation (min)  40 min  -KG      Total Timed Code Minutes- OT 35 minute(s)  -KG      OT Non-Billable Time (min) 5 min  -KG      OT Received On 10/28/23  -KG         Timed Charges    88560 - OT Therapeutic Exercise Minutes 15  -KG      13963 - OT Self Care/Mgmt Minutes 20  -KG         Untimed Charges    OT Eval/Re-eval Minutes 5  -KG         Total Minutes    Timed Charges Total Minutes 35  -KG      Untimed Charges Total Minutes 5  -KG       Total Minutes 40  -KG                User Key  (r) = Recorded By, (t) = Taken By, (c) = Cosigned By      Initials Name Provider Type    KG Wojciech Guerra OT Occupational Therapist                  Therapy Charges for Today       Code Description Service Date Service Provider Modifiers Qty    69024129198 HC OT THER PROC EA 15 MIN 10/28/2023 Wojciech Guerra OT GO 1    51993486059 HC OT SELF CARE/MGMT/TRAIN EA 15 MIN 10/28/2023 Wojciech Guerra OT GO 1    41200894770 HC OT EVAL LOW COMPLEXITY 3 10/28/2023 Wojciech Guerra OT GO 1                 Wojciech Guerra OT  10/28/2023

## 2023-10-28 NOTE — DISCHARGE SUMMARY
Orthopaedic Surgery  Progress Note  10/28/2023    Patients Name:  Katia Spears  YOB: 1960  Age: 63 y.o.  Medical Records Number:  [id number]  Date of Admission: 10/27/2023    No complaints except appropriate pain and stiffness in the right hip    Vitals:  Vitals:    10/27/23 2106 10/27/23 2326 10/28/23 0128 10/28/23 0522   BP: 126/78  116/72 126/70   BP Location: Left arm  Left arm Left arm   Patient Position: Lying  Lying Lying   Pulse: 115 100 86 70   Resp: 16  16 16   Temp: 97 °F (36.1 °C)  97.2 °F (36.2 °C) 97.1 °F (36.2 °C)   TempSrc: Oral  Oral Oral   SpO2: 95%  97% 96%   Weight:       Height:           RUE:  NVI, Sensation intact  No signs of DVT    Incision: clean, no signs of infection    Lab Results (last 24 hours)       Procedure Component Value Units Date/Time    Basic Metabolic Panel [[id number]]  (Abnormal) Collected: 10/28/23 0430    Specimen: Blood Updated: 10/28/23 0522     Glucose 117 mg/dL      BUN 14 mg/dL      Creatinine 0.86 mg/dL      Sodium 136 mmol/L      Potassium 4.3 mmol/L      Comment: Slight hemolysis detected by analyzer. Results may be affected.        Chloride 100 mmol/L      CO2 25.3 mmol/L      Calcium 8.9 mg/dL      BUN/Creatinine Ratio 16.3     Anion Gap 10.7 mmol/L      eGFR 75.1 mL/min/1.73     Narrative:      GFR Normal >60  Chronic Kidney Disease <60  Kidney Failure <15      Hemoglobin & Hematocrit, Blood [[id number]]  (Abnormal) Collected: 10/28/23 0430    Specimen: Blood Updated: 10/28/23 0504     Hemoglobin 11.2 g/dL      Hematocrit 33.7 %             XR Shoulder 1 View Right    Result Date: 10/27/2023  Narrative: XR SHOULDER 1 VW RIGHT-  INDICATIONS: Postoperative evaluation.  TECHNIQUE: Frontal view of the right shoulder  COMPARISON: 10/20/2023  FINDINGS:   Intact appearing right shoulder arthroplasty hardware is seen with adjacent surgical soft tissue gas. No acute fracture is identified.       Impression:  Postsurgical changes.    This report was  finalized on 10/27/2023 9:54 AM by Dr. Delfin Bruce M.D on Workstation: [id number]      Peripheral Block    Result Date: 10/27/2023  Narrative: Sumanth Payne MD     10/27/2023  6:42 AM Peripheral Block Pre-sedation assessment completed: 10/27/2023 6:24 AM Patient reassessed immediately prior to procedure Patient location during procedure: pre-op Start time: 10/27/2023 6:27 AM Stop time: 10/27/2023 6:35 AM Reason for block: at surgeon's request and post-op pain management Performed by Anesthesiologist: Sumanth Payne MD Preanesthetic Checklist Completed: patient identified, IV checked, site marked, risks and benefits discussed, surgical consent, monitors and equipment checked, pre-op evaluation and timeout performed Prep: Pt Position: supine Sterile barriers:cap, gloves and mask Prep: ChloraPrep Patient monitoring: blood pressure monitoring, continuous pulse oximetry and EKG Procedure Sedation: yes Performed under: local infiltration Guidance:ultrasound guided ULTRASOUND INTERPRETATION.  Using ultrasound guidance a 21 G gauge needle was placed in close proximity to the nerve, at which point, under ultrasound guidance anesthetic was injected in the area of the nerve and spread of the anesthesia was seen on ultrasound in close proximity thereto.  There were no abnormalities seen on ultrasound; a digital image was taken; and the patient tolerated the procedure with no complications. Images:still images obtained, printed/placed on chart Laterality:right Block Type:interscalene Injection Technique:single-shot Needle Type:echogenic and Tuohy Needle Gauge:21 G Resistance on Injection: none Medications Used: dexamethasone (DECADRON) injection - Injection  4 mg - 10/27/2023 6:27:00 AM ropivacaine (NAROPIN) 0.5 % injection - Injection  20 mL - 10/27/2023 6:27:00 AM Post Assessment Injection Assessment: negative aspiration for heme, no paresthesia on injection and incremental injection Patient Tolerance:comfortable  throughout block Complications:no Additional Notes Ultrasound guidance used to visualize nerve anatomy, guide needle placement and verify local anesthetic disbursement.     XR Shoulder 2+ View Right    Result Date: 10/20/2023  Narrative: RIGHT SHOULDER: INTERNAL AND EXTERNAL ROTATION  HISTORY: Preoperative exam for right shoulder replacement.  COMPARISON: None.  FINDINGS: There is advanced osteoarthritis of the right glenohumeral joint with joint space narrowing and subchondral sclerosis and cyst formation. Marginal osteophyte formation is present. There are also mild arthritic changes of the AC joint,  There is no evidence for fracture or acute abnormality.      Impression: Advanced glenohumeral joint osteoarthritis.  This report was finalized on 10/20/2023 1:42 PM by Dr. Augie Hernández M.D on Workstation: [id number]      CT chest wo IV contrast    Result Date: 10/9/2023  Narrative: CT CHEST WITHOUT INTRAVENOUS CONTRAST INDICATION: pneumonia COMPARISON: Prior CT from 12/22 and 3/23 TECHNIQUE: Axial computed tomographic images of the chest without intravenous contrast. Reformatted images include axial, sagittal, and coronal. Axial MIP images were created on a separate workstation and submitted for interpretation. FINDINGS: Lungs/Airways/Pleura: There is continued improvement in the bilateral ground glass opacities. Some residual opacities persist in the upper lobes bilaterally.  The central airways are clear. No pleural effusion or pneumothorax. Lymph nodes: Small mediatinal lymphnodes are visualized, unchanged from 3/23 Mediastinum: No mediastinal mass. Chest Wall and Lower Neck: No chest wall mass. Normal thyroid. Heart and Pericardium: Normal sized heart without pericardial effusion. Vessels: The thoracic aorta is normal in size.  The pulmonary arteries are not enlarged. Bones: No acute or aggressive osseous abnormality is seen. Included Upper Abdomen: Within normal limits for technique. IMPRESSION: Continued  improvement in bilateral ground glass opacities. Dictated by: Nathan Huggins M.D. Signed by Nathan Huggins M.D. on 10/13/2023 18:41 ##### Final ##### Dictated by:    NATHAN HUGGINS MD-RAD Dictated DT/TM: 10/13/2023 6:41 pm Interpreted and electronically signed by:  NATHAN HUGGINS MD-RAD Signed DT/TM:  10/13/2023 6:41 pm       Assesment/Plan:    Procedures:  Right TSA  Postoperative Day: 1  Weightbearing Status:  WBAT  DVT Prophylaxis:  ASA for DVT prophylaxis    Disposition:  Home with home health after PT today, if comfortable and mobilizing safely    Law Weiss PA-C  [facility]  [address] [place], [place] [address] [phone]    10/28/2023

## 2023-10-28 NOTE — PLAN OF CARE
Goal Outcome Evaluation:  Plan of Care Reviewed With: patient        Progress: improving     Pt is a 63 y.o. female s/p R rTSA on 10/27/2023 and is NWB of [place]. Patient very lethargic throughout session and limited with overall history secondary to difficulty remaining aroused. Patient reports spouse will be present during discharge activities and able to assist. Pt presents today with R shoulder pain, generalized weakness, R shoulder ROM deficits, and mild unsteadiness during ambulation due to being drowsy. Pt UIC upon arrival and requires CGA for STS transfers, and CGA for amb with HHA of LUE for 20 ft. Ambulation overall limited secondary to patient lethargic. Deferred steps at this time. If pt more aroused, she would likely be able to navigate stairs with assist x 1. Pt will benefit from skilled PT to address the previous impairments and improve safety with functional mobility. Anticipate pt will D/C to home with assist prior to transitioning to OP services.        Anticipated Discharge Disposition (PT): home with assist, home with outpatient therapy services

## 2023-10-28 NOTE — PLAN OF CARE
Goal Outcome Evaluation:  Plan of Care Reviewed With: patient, spouse           Outcome Evaluation: PT is POD1 R rTSA. Reports being (I) w/ BADLs at baseline, however  will provide assistance at d/c.  present for education/training 2/2 patient lethargic from pain medication. Educated on precautions s/p rTSA and maintaining NWB RUE. Performed 1 x 10 shoulder pendulums & elbow/wrist/hand AROM. Pt w/ difficulty performing exercises 2/2 lethargy, however  verbalized understanding of HEP and precautions to assist at d/c. Requiring Max A for UBD & Min A for LBD. SBA<>CGA for STS transfers. Plans to d/c home w/ increased assistance.      Anticipated Discharge Disposition (OT): home with assist

## 2023-10-28 NOTE — PROGRESS NOTES
"    Name: Katia Spears ADMIT: 10/27/2023   : 1960  PCP: Esha Galeana APRN    MRN: [id number] LOS: 0 days   AGE/SEX: 63 y.o. female  ROOM: [facility]     Subjective   Subjective   Has had some discomfort today.  Requiring pain medication is causing her to be very sleepy.     Objective   Objective   Vital Signs  Temp:  [96.9 °F (36.1 °C)-97.3 °F (36.3 °C)] 97.1 °F (36.2 °C)  Heart Rate:  [] 93  Resp:  [14-16] 16  BP: (112-140)/(70-85) 135/85  SpO2:  [95 %-97 %] 96 %  on  Flow (L/min):  [2] 2;   Device (Oxygen Therapy): room air  Body mass index is 33.52 kg/m².  Physical Exam  Vitals and nursing note reviewed.   Constitutional:       General: She is not in acute distress.  Cardiovascular:      Rate and Rhythm: Normal rate and regular rhythm.   Pulmonary:      Effort: Pulmonary effort is normal.      Breath sounds: Normal breath sounds.   Abdominal:      General: Bowel sounds are normal.      Palpations: Abdomen is soft.      Tenderness: There is no abdominal tenderness.   Musculoskeletal:      Comments: RUE in sling   Skin:     General: Skin is warm and dry.   Neurological:      Mental Status: She is alert. Mental status is at baseline.         Results Review:       I reviewed the patient's new clinical results.  Results from last 7 days   Lab Units 10/28/23  0430   HEMOGLOBIN g/dL 11.2*     Results from last 7 days   Lab Units 10/28/23  0430   SODIUM mmol/L 136   POTASSIUM mmol/L 4.3   CHLORIDE mmol/L 100   CO2 mmol/L 25.3   BUN mg/dL 14   CREATININE mg/dL 0.86   GLUCOSE mg/dL 117*   EGFR mL/min/1.73 75.1       Results from last 7 days   Lab Units 10/28/23  0430   CALCIUM mg/dL 8.9       No results found for: \"HGBA1C\", \"POCGLU\"    I have personally reviewed all medications:  Scheduled Medications  aspirin, 81 mg, Oral, Daily  atorvastatin, 10 mg, Oral, Daily  baclofen, 10 mg, Oral, TID  benzoyl peroxide, , Topical, Nightly  cetirizine, 10 mg, Oral, Nightly  clonazePAM, 2 mg, Oral, Nightly  docusate " sodium, 100 mg, Oral, BID  DULoxetine, 60 mg, Oral, Nightly  linaclotide, 145 mcg, Oral, QAM  lisinopril, 10 mg, Oral, Q24H  montelukast, 10 mg, Oral, Nightly  pantoprazole, 40 mg, Oral, QAM    Infusions  lactated ringers, 75 mL/hr, Last Rate: Stopped (10/27/23 1931)    Diet  Diet: Regular/House Diet; Texture: Regular Texture (IDDSI 7); Fluid Consistency: Thin (IDDSI 0)    I have personally reviewed:  [x]  Laboratory   [x]  Microbiology   [x]  Radiology   []  EKG/Telemetry  []  Cardiology/Vascular   []  Pathology    []  Records       Assessment/Plan     Active Hospital Problems    Diagnosis  POA    **Osteoarthritis of glenohumeral joint, right [M19.011]  Unknown    Rotator cuff tear arthropathy of right shoulder [M75.101, M12.811]  Yes    Class 1 obesity [E66.9]  Yes    COPD (chronic obstructive pulmonary disease) [J44.9]  Yes    Fibromyalgia [M79.7]  Yes    HTN (hypertension) [I10]  Yes    JE (obstructive sleep apnea) [G47.33]  Yes      Resolved Hospital Problems   No resolved problems to display.       63 y.o. female who is s/p Right reverse total shoulder arthroplasty.    Medically stable.  Labs and vitals acceptable.  Discussed with her about resuming her Zestoretic tomorrow after discharge, not today.   at bedside.  Defer to primary regarding discharge today.  No objections from my perspective.       Adilson Tadeo MD  [facility]  10/28/23  11:43 EDT

## 2023-10-28 NOTE — PLAN OF CARE
Goal Outcome Evaluation:  Plan of Care Reviewed With: patient        Progress: improving  Outcome Evaluation: VSS, RA, SL, up with assist of 1, voiding per BRP, sling in place, NWB, pain increasing as block wearing off, educated on BP monitoring, home today

## 2023-10-28 NOTE — OUTREACH NOTE
Prep Survey      Flowsheet Row Responses   [facility] patient discharged from? [place]   Is LACE score < 7 ? Yes   Eligibility Psychiatric   Date of Admission 10/27/23   Date of Discharge 10/28/23   Discharge Disposition Home or Self Care   Discharge diagnosis Osteoarthritis of glenohumeral joint, right,Right reverse total shoulder arthroplasty   Does the patient have one of the following disease processes/diagnoses(primary or secondary)? Total Joint Replacement   Does the patient have Home health ordered? No   Is there a DME ordered? No   Prep survey completed? Yes            Chrissy MELGOZA - Registered Nurse

## 2023-10-28 NOTE — THERAPY EVALUATION
Patient Name: Katia Spears  : 1960    MRN: [id number]                              Today's Date: 10/28/2023       Admit Date: 10/27/2023    Visit Dx:     ICD-10-CM ICD-9-CM   1. Rotator cuff tear arthropathy of right shoulder  M75.101 716.81    M12.811    2. Osteoarthritis of glenohumeral joint, right  M19.011 715.91     Patient Active Problem List   Diagnosis    Anemia    Anxiety    COPD (chronic obstructive pulmonary disease)    Deviated nasal septum    Encounter for long-term (current) use of other medications    Fibromyalgia    HTN (hypertension)    JE (obstructive sleep apnea)    Personal history of tobacco use, presenting hazards to health    Generalized abdominal pain    Neck pain    Tachycardia    Constipation    Vitamin D deficiency    Mixed hyperlipidemia    Asthma    GERD (gastroesophageal reflux disease)    Nontraumatic complete tear of right rotator cuff    History of anemia    Other fatigue    Environmental allergies    Class 1 obesity    Osteoarthritis of glenohumeral joint, right    PLMD (periodic limb movement disorder)    Rotator cuff tear arthropathy of right shoulder     Past Medical History:   Diagnosis Date    Anemia, unspecified     Anxiety     Arthritis     Arthritis of back     Asthma     Chronic obstructive pulmonary disease, unspecified     Chronic sinusitis, unspecified     Constipation     Deviated nasal septum     Dysthymic disorder     Essential (primary) hypertension     Fibromyalgia     Fracture of wrist 2018    Frozen shoulder     Generalized anxiety disorder     GERD (gastroesophageal reflux disease)     Hypo-osmolality and hyponatremia     Knee swelling     Mixed hyperlipidemia     Obstructive sleep apnea (adult) (pediatric)     cpap    Osteopenia     Osteoporosis     Other difficulties with micturition     Other long term (current) drug therapy     Periarthritis of shoulder     PLMD (periodic limb movement disorder)     Pneumonia 2022    PONV (postoperative  nausea and vomiting)     RLS (restless legs syndrome)     Rotator cuff syndrome     Scoliosis     Tear of meniscus of knee     Urinary incontinence in female     Vitamin D deficiency, unspecified      Past Surgical History:   Procedure Laterality Date     SECTION      X2    CHOLECYSTECTOMY      COLONOSCOPY      COLONOSCOPY N/A 2022    Procedure: COLONOSCOPY WITH BIOPSIES, POLYPECTOMIES;  Surgeon: Esha Garcia MD;  Location: [facility];  Service: Gastroenterology;  Laterality: N/A;  COLON POLYPS    DILATATION AND CURETTAGE      MULTIPLE    ENDOMETRIAL ABLATION  2010    ENDOSCOPY N/A 2022    Procedure: ESOPHAGOGASTRODUODENOSCOPY WITH BIOPSIES;  Surgeon: Esha Garcia MD;  Location: [facility] ENDOSCOPY;  Service: Gastroenterology;  Laterality: N/A;  HIATAL HERNIA    KNEE SURGERY Right     torn meniscus    LEEP  2002    OVARY SURGERY Right     TRIGGER POINT INJECTION      WRIST FRACTURE SURGERY Right 2019    hardware      General Information       Row Name 10/28/23 1224          Physical Therapy Time and Intention    Document Type evaluation;discharge evaluation/summary  -WALTER     Mode of Treatment individual therapy;physical therapy  -       Row Name 10/28/23 1224          General Information    Patient Profile Reviewed yes  -WALTER     Existing Precautions/Restrictions non-weight bearing;shoulder  R UE  -WALTER     Barriers to Rehab none identified  lethargic  -       Row Name 10/28/23 1224          Living Environment    People in Home spouse  -WALTER       Row Name 10/28/23 1224          Home Main Entrance    Number of Stairs, Main Entrance three;four  -WALTER     Stair Railings, Main Entrance --  pt unable to report  -       Row Name 10/28/23 1224          Stairs Within Home, Primary    Number of Stairs, Within Home, Primary none  -       Row Name 10/28/23 1224          Cognition    Orientation Status (Cognition) oriented to;person;place;situation  -       Row Name  10/28/23 1224          Safety Issues, Functional Mobility    Safety Issues Affecting Function (Mobility) ability to follow commands;awareness of need for assistance;judgment;insight into deficits/self-awareness;problem-solving;safety precaution awareness;safety precautions follow-through/compliance  -WALTER     Impairments Affecting Function (Mobility) balance;pain;strength;range of motion (ROM)  -WALTER     Comment, Safety Issues/Impairments (Mobility) non-skid socks and gait belt  -WALTER               User Key  (r) = Recorded By, (t) = Taken By, (c) = Cosigned By      Initials Name Provider Type    Sola Ghotra PT Physical Therapist                   Mobility       Row Name 10/28/23 1226          Bed Mobility    Comment, (Bed Mobility) UIC  -       Row Name 10/28/23 1226          Bed-Chair Transfer    Bed-Chair [place] (Transfers) not tested  -WALTER       Row Name 10/28/23 1226          Sit-Stand Transfer    Sit-Stand [place] (Transfers) standby assist  -WALTER     Comment, (Sit-Stand Transfer) HHA LUE  -       Row Name 10/28/23 1226          Gait/Stairs (Locomotion)    [place] Level (Gait) minimum assist (75% patient effort)  -     Assistive Device (Gait) other (see comments)  HHA LUE  -WALTER     Distance in Feet (Gait) 20'  -WALTER     Deviations/Abnormal Patterns (Gait) base of support, wide;joel decreased;gait speed decreased  -WALTER     [place] Level (Stairs) not tested  -WALTER     Comment, (Gait/Stairs) mild unsteadiness during ambulation, patient limited due to being lethargic, deferred steps at this time  -       Row Name 10/28/23 1226          Mobility    Extremity Weight-bearing Status right upper extremity  -WALTER     Right Upper Extremity (Weight-bearing Status) non weight-bearing (NWB)  -WALTER               User Key  (r) = Recorded By, (t) = Taken By, (c) = Cosigned By      Initials Name Provider Type    Sola Ghotra PT Physical Therapist                   Obj/Interventions        Row Name 10/28/23 1227          Range of Motion Comprehensive    Comment, General Range of Motion RUE ROM limited  -WALTER       Row Name 10/28/23 1227          Strength Comprehensive (MMT)    Comment, General Manual Muscle Testing (MMT) Assessment generalized BLE weakness  -       Row Name 10/28/23 1227          Motor Skills    Therapeutic Exercise other (see comments)  LAQ/AP x 10 (limited due to lethargic)  -       Row Name 10/28/23 1227          Balance    Comment, Balance mild unsteadiness during ambulation without LOB  -WALTER               User Key  (r) = Recorded By, (t) = Taken By, (c) = Cosigned By      Initials Name Provider Type    WALTER Goldberg, Sola Hart, PT Physical Therapist                   Goals/Plan       Row Name 10/28/23 1229          Bed Mobility Goal 1 (PT)    Activity/Assistive Device (Bed Mobility Goal 1, PT) bed mobility activities, all  -WALTER     [place] Level/Cues Needed (Bed Mobility Goal 1, PT) modified independence  -WALTER     Time Frame (Bed Mobility Goal 1, PT) 5 days  -       Row Name 10/28/23 1229          Transfer Goal 1 (PT)    Activity/Assistive Device (Transfer Goal 1, PT) sit-to-stand/stand-to-sit;bed-to-chair/chair-to-bed  -WALTER     [place] Level/Cues Needed (Transfer Goal 1, PT) standby assist  -WALTER     Time Frame (Transfer Goal 1, PT) 5 days  -WALTER       Row Name 10/28/23 1229          Gait Training Goal 1 (PT)    Activity/Assistive Device (Gait Training Goal 1, PT) gait (walking locomotion)  -WALTER     [place] Level (Gait Training Goal 1, PT) standby assist  -WALTER     Distance (Gait Training Goal 1, PT) 200'  -WALTER     Time Frame (Gait Training Goal 1, PT) 5 days  -WALTER       Row Name 10/28/23 1229          Stairs Goal 1 (PT)    Activity/Assistive Device (Stairs Goal 1, PT) stairs, all skills  -WALTER     [place] Level/Cues Needed (Stairs Goal 1, PT) contact guard required  -WALTER     Number of Stairs (Stairs Goal 1, PT) 4  -WALTER     Time Frame (Stairs Goal 1, PT) 5 days   -       Row Name 10/28/23 1229          Therapy Assessment/Plan (PT)    Planned Therapy Interventions (PT) balance training;bed mobility training;gait training;home exercise program;patient/family education;stair training;strengthening;transfer training  -               User Key  (r) = Recorded By, (t) = Taken By, (c) = Cosigned By      Initials Name Provider Type    Sola Ghotra, LENI Physical Therapist                   Clinical Impression       Row Name 10/28/23 1228          Pain    Pretreatment Pain Rating 7/10  -WALTER     Posttreatment Pain Rating 7/10  -WALTER     Pain Location - Side/Orientation Right  -     Pain Location incisional  -     Pain Location - shoulder  -     Pain Intervention(s) Repositioned  -       Row Name 10/28/23 1228          Plan of Care Review    Plan of Care Reviewed With patient  -     Progress improving  -       Row Name 10/28/23 1228          Therapy Assessment/Plan (PT)    Rehab Potential (PT) good, to achieve stated therapy goals  -     Criteria for Skilled Interventions Met (PT) yes;meets criteria;skilled treatment is necessary  -     Therapy Frequency (PT) 5 times/wk  -       Row Name 10/28/23 1228          Vital Signs    O2 Delivery Pre Treatment room air  -WALTER     O2 Delivery Intra Treatment room air  -     O2 Delivery Post Treatment room air  -     Pre Patient Position Sitting  -     Intra Patient Position Standing  -WALTER     Post Patient Position Sitting  -       Row Name 10/28/23 1228          Positioning and Restraints    Pre-Treatment Position sitting in chair/recliner  -WALTER     Post Treatment Position chair  -WALTER     In Chair reclined;call light within reach;exit alarm on;encouraged to call for assist;notified nsg;with brace  -               User Key  (r) = Recorded By, (t) = Taken By, (c) = Cosigned By      Initials Name Provider Type    Sola Ghotra, PT Physical Therapist                   Outcome Measures       Row Name  10/28/23 1230 10/28/23 0750       How much help from another person do you currently need...    Turning from your back to your side while in flat bed without using bedrails? 3  -WALTER 3  -LD    Moving from lying on back to sitting on the side of a flat bed without bedrails? 3  -WALTER 3  -LD    Moving to and from a bed to a chair (including a wheelchair)? 3  -WALTER 3  -LD    Standing up from a chair using your arms (e.g., wheelchair, bedside chair)? 3  -WALTER 3  -LD    Climbing 3-5 steps with a railing? 2  -WALTER 2  -LD    To walk in hospital room? 3  -WALTER 3  -LD    AM-PAC 6 Clicks Score (PT) 17  -WALTER 17  -LD    Highest level of mobility 5 --> Static standing  -WALTER 5 --> Static standing  -LD      Row Name 10/28/23 1139          Modified [place] Scale    Modified [place] Scale 2 - Slight disability.  Unable to carry out all previous activities but able to look after own affairs without assistance.  -KG       Row Name 10/28/23 1230 10/28/23 1139       Functional Assessment    Outcome Measure Options AM-PAC 6 Clicks Basic Mobility (PT)  - AM-PAC 6 Clicks Daily Activity (OT);Modified [place]  -KG              User Key  (r) = Recorded By, (t) = Taken By, (c) = Cosigned By      Initials Name Provider Type    Sola Ghotra, PT Physical Therapist    Esha Franks, RN Registered Nurse    Wojciech Harris, OT Occupational Therapist                                 Physical Therapy Education       Title: PT OT SLP Therapies (In Progress)       Topic: Physical Therapy (Done)       Point: Mobility training (Done)       Learning Progress Summary             Patient Acceptance, E,TB, VU,NR by WALTER at 10/28/2023 1230                                         User Key       Initials Effective Dates Name Provider Type Discipline     05/19/21 -  Sola Goldberg, PT Physical Therapist PT                  PT Recommendation and Plan  Planned Therapy Interventions (PT): balance training, bed mobility training, gait training, home  exercise program, patient/family education, stair training, strengthening, transfer training  Plan of Care Reviewed With: patient  Progress: improving     Time Calculation:         PT Charges       Row Name 10/28/23 1234             Time Calculation    Start Time 0845  -      Stop Time 0900  -WALTER      Time Calculation (min) 15 min  -WALTER      PT Received On 10/28/23  -WALTER      PT - Next Appointment 10/30/23  -WALTER      PT Goal Re-Cert Due Date 11/02/23  -WALTER                User Key  (r) = Recorded By, (t) = Taken By, (c) = Cosigned By      Initials Name Provider Type    Sola Ghotra, PT Physical Therapist                  Therapy Charges for Today       Code Description Service Date Service Provider Modifiers Qty    69640713442 HC PT EVAL MOD COMPLEXITY 1 10/28/2023 Sola Goldberg, PT GP 1    86353201716 HC PT THERAPEUTIC ACT EA 15 MIN 10/28/2023 Sola Goldberg, PT GP 1            PT G-Codes  Outcome Measure Options: AM-PAC 6 Clicks Basic Mobility (PT)  AM-PAC 6 Clicks Score (PT): 17  AM-PAC 6 Clicks Score (OT): 16  Modified [place] Scale: 2 - Slight disability.  Unable to carry out all previous activities but able to look after own affairs without assistance.  PT Discharge Summary  Anticipated Discharge Disposition (PT): home with assist, home with outpatient therapy services    Sola Goldberg PT  10/28/2023

## 2023-10-29 NOTE — CASE MANAGEMENT/SOCIAL WORK
Case Management Discharge Note      Final Note: home         Selected Continued Care - Discharged on 10/28/2023 Admission date: 10/27/2023 - Discharge disposition: Home or Self Care      Destination    No services have been selected for the patient.                Durable Medical Equipment    No services have been selected for the patient.                Dialysis/Infusion    No services have been selected for the patient.                Home Medical Care    No services have been selected for the patient.                Therapy    No services have been selected for the patient.                Community Resources    No services have been selected for the patient.                Community & DME    No services have been selected for the patient.                    Transportation Services  Private: Car    Final Discharge Disposition Code: 01 - home or self-care

## 2023-10-30 ENCOUNTER — TELEPHONE (OUTPATIENT)
Dept: ORTHOPEDIC SURGERY | Facility: CLINIC | Age: 63
End: 2023-10-30

## 2023-10-30 ENCOUNTER — TRANSITIONAL CARE MANAGEMENT TELEPHONE ENCOUNTER (OUTPATIENT)
Dept: CALL CENTER | Facility: HOSPITAL | Age: 63
End: 2023-10-30
Payer: OTHER GOVERNMENT

## 2023-10-30 NOTE — OUTREACH NOTE
Call Center TCM Note      Flowsheet Row Responses   [facility] facility patient discharged from? [place]   Does the patient have one of the following disease processes/diagnoses(primary or secondary)? Total Joint Replacement   Joint surgery performed? Shoulder   TCM attempt successful? No  [no verbal release on file]   Unsuccessful attempts Attempt 1            Katelyn Temple RN    10/30/2023, 08:59 EDT

## 2023-10-30 NOTE — OUTREACH NOTE
Call Center TCM Note      Flowsheet Row Responses   [facility] patient discharged from? [place]   Does the patient have one of the following disease processes/diagnoses(primary or secondary)? Total Joint Replacement   Joint surgery performed? Shoulder   TCM attempt successful? Yes   Call start time 1553   Call end time 1606   Has the patient been back in either the hospital or Emergency Department since discharge? No   List who call center can speak with verbal permission to speak with    Medication alerts for this patient ASA   Does the patient have all medications related to this admission filled (includes all antibiotics, pain medications, etc.) Yes   Is the patient taking all medications as directed (includes completed medication regime)? Yes   Is the patient able to teach back alternate methods of pain control? Reposition   Comments Hospital f/u 11/7/23@1100am   Does the patient have an appointment with their PCP within 7-14 days of discharge? Yes   Psychosocial issues? No   Has the patient began therapy sessions (either in the home or as an out patient)? Yes   Does the patient have a wound vac in place? Yes   Did the patient receive a copy of their discharge instructions? Yes   Nursing interventions Reviewed instructions with patient   What is the patient's perception of their functional status since discharge? Improving  [Pt is very groggy during call-- reports that pt has actually improved since yesterday.  Reports she is not taking medications as much as ordered and last dose around 230pm today.  Pt had some issues w/wound vac leak but seems to have stopped.]   Is the patient able to teach back signs and symptoms of infection? Temp >100.4 for 24h or longer, Incisional drainage   Is the patient able to teach back how to prevent infection? Check incision daily   Is the patient able to teach back signs and symptoms of DVT? Redness in calf, Swelling in calf, Severe pain in calf, Area hot  to touch  [reviewed]   Is the patient/caregiver able to teach back the hierarchy of who to call/visit for symptoms/problems? PCP, Specialist, Home health nurse, Urgent Care, ED, 911 Yes   Additional teach back comments Advised to call ortho office back to determine f/u regarding wound concerns.    reports some issues r/t edema which had been occuring prior to surgery and would like to f/u with PCP in this regard.  Urged him to monitor for changes in mental status, fever and other s/s infection--notify surgeon.   TCM call completed? Yes   Call end time [id number]            Katelyn Temple RN    10/30/2023, 16:11 EDT

## 2023-10-30 NOTE — TELEPHONE ENCOUNTER
UNABLE TO WT CLINICAL     Hub staff attempted to follow warm transfer process and was unsuccessful     Caller: PAUL     Relationship to patient: PATIENT     Best call back number: [phone]    Patient is needing: RIGHT SHOULDER SX 10/27/23 POST OP QUESTION CONCERNING INCISION

## 2023-10-31 ENCOUNTER — TELEPHONE (OUTPATIENT)
Dept: ORTHOPEDIC SURGERY | Facility: CLINIC | Age: 63
End: 2023-10-31

## 2023-10-31 NOTE — TELEPHONE ENCOUNTER
Caller: SAMMY HOWARD    Relationship: Emergency Contact    Best call back number: [phone]     What is the best time to reach you: ANY     Who are you requesting to speak with (clinical staff, provider,  specific staff member): CLINICAL     Do you know the name of the person who called: YES     What was the call regarding: STATE THE DRAIN PORT IS SCABBED OVER AND NO LONGER DRAINING, ALSO STATES THAT THERE IS A PIECE OF CLOTH STUCK TO IT THAT IS UNABLE TO BE REMOVED- WOULD LIKE TO KNOW HOW TO PROCEED

## 2023-11-03 DIAGNOSIS — Z96.611 S/P REVERSE TOTAL SHOULDER ARTHROPLASTY, RIGHT: Primary | ICD-10-CM

## 2023-11-10 ENCOUNTER — OFFICE VISIT (OUTPATIENT)
Dept: ORTHOPEDIC SURGERY | Facility: CLINIC | Age: 63
End: 2023-11-10
Payer: MEDICARE

## 2023-11-10 ENCOUNTER — HOSPITAL ENCOUNTER (OUTPATIENT)
Dept: GENERAL RADIOLOGY | Facility: HOSPITAL | Age: 63
Discharge: HOME | End: 2023-11-10
Admitting: PHYSICIAN ASSISTANT
Payer: MEDICARE

## 2023-11-10 VITALS — HEIGHT: 61 IN | TEMPERATURE: 98.6 F | WEIGHT: 177 LBS | BODY MASS INDEX: 33.42 KG/M2

## 2023-11-10 DIAGNOSIS — Z96.611 S/P REVERSE TOTAL SHOULDER ARTHROPLASTY, RIGHT: Primary | ICD-10-CM

## 2023-11-10 DIAGNOSIS — Z96.611 S/P REVERSE TOTAL SHOULDER ARTHROPLASTY, RIGHT: ICD-10-CM

## 2023-11-10 PROCEDURE — 73030 X-RAY EXAM OF SHOULDER: CPT

## 2023-11-10 PROCEDURE — 99024 POSTOP FOLLOW-UP VISIT: CPT | Performed by: PHYSICIAN ASSISTANT

## 2023-11-10 PROCEDURE — 1159F MED LIST DOCD IN RCRD: CPT | Performed by: PHYSICIAN ASSISTANT

## 2023-11-10 PROCEDURE — 1160F RVW MEDS BY RX/DR IN RCRD: CPT | Performed by: PHYSICIAN ASSISTANT

## 2023-11-10 RX ORDER — TRAMADOL HYDROCHLORIDE 50 MG/1
TABLET ORAL
Qty: 45 TABLET | Refills: 0 | Status: SHIPPED | OUTPATIENT
Start: 2023-11-10

## 2023-11-10 NOTE — PROGRESS NOTES
POST OPERATIVE FOLLOW UP ([facility])      NAME: Katia Spears           : 1960    MRN: [id number]      Chief Complaint   Patient presents with    Right Shoulder - Post-op     Date of Surgery: 10/27/23  ?     HPI  Katia Spears presents for 2 week postoperative follow up s/p right reverse total shoulder arthroplasty performed by Saad Gorman MD. The patient has had a relatively normal postoperative course.  The patient has had improving normal postoperative pain.  The patient has had no issues with the wound.  She stopped taking the Percocet due to the nausea.  She is using Tylenol, but states she would like to have something a little bit stronger than that for when she needs it.        Physical Exam  General: alert, appears stated age, cooperative, and no distress  Incision/Skin: healing well, no drainage, no erythema, no seroma, no swelling, incision well approximated  Musculoskeletal:   RIGHT shoulder  She is afebrile  There is no glenohumeral instability. No clicking, popping or catching is noted.   Axillary nerve function is well preserved.   Radial artery pulses are palpable.   Range of motion: passive flexion 20 degrees, passive abduction 20 degrees.      Diagnostic Data:  RIGHT shoulder xrays   4 views were ordered by Abdias Ramachandran PA-C. Performed at [facility] on 11/10/23. Images were independently viewed and interpreted by myself, my impression as follows:  Findings: Expected post operative appearance  Bony Lesion: No  Soft tissues: within normal limits  Joint spaces: WNL  Hardware appropriately positioned: yes  Prior studies available for comparison: yes          Assessment & Plan   Assessment:    1. S/P reverse total shoulder arthroplasty, right          Plan:   Orders Placed This Encounter   Procedures    Ambulatory Referral to Physical Therapy Evaluate and treat, POST OP     New Medications Ordered This Visit   Medications    traMADol (ULTRAM) 50 MG tablet     Si-2  Oral Q4H PRN severe pain     Dispense:  45 tablet     Refill:  0     Wound care instructions given  Ice and/or modalities as beneficial  Watch for signs and symptoms of infection  Physical therapy referral given  Weight bearing parameters reviewed  Take prescribed medications as instructed, but only as tolerated  Aftercare and dental prophylaxis for joint replacement surgery.  Discussion of orthopaedic goals and activities and patient and/or guardian expressed appreciation.  Follow up in 4-6 weeks         Patient Instructions   Post-operative Phase 1 (Weeks 0-6):    Goals:   1.) Ensure wound healing  2.) Protect the repair  3.) Decrease pain     Brace Instructions:  Wear sling/pillow at all times except when showering or performing pendulum exercises.  You should remove the sling and perform pendulum exercises 5 or 6 times per day for 5-10 minutes at a time.  For the first 6 weeks, when lying supine, please place a pillow or a rolled towel behind her arm to limit extension, which places stress on the tissues on the front of your shoulder.  A good rule of thumb is that you should always be able to visualize your elbow when lying down.    Weightbearing (WB) status:  No lifting of objects heavier than a coffee cup.  No shoulder extension or sudden reaching.  No shoulder motion behind the back.    Range of motion (ROM):  NO ACTIVE ROM OF THE SHOULDER.  Full active ROM of elbow, wrist and hand.  No shoulder extension  Icing of the shoulder  For exercise may use stationary bike, elliptical machine, stair stepper          Post-operative Phase 2 (Weeks 7-8):    Goals:   1.)  Protect the shoulder arthroplasty  2.)  Prevent shoulder stiffness  3.)  Improve range of motion  4.)  Begin strengthening    Brace Instructions:  Gradually discontinue use of the sling/pillow    Weightbearing (WB) status:  No lifting of objects heavier than a coffee cup  No excessive stretching or sudden movements  He may begin to use the arm for  routine daily activities such as feeding, dressing, bathing and personal hygiene  Continue to avoid shoulder extension  You may raise the arm away from the body, but not while carrying anything heavier than a coffee cup  No forceful pushing or pulling  No supporting her body weight with your hands    Range of motion (ROM):  Continue previous range of motion exercises  Advance active assisted range of motion as tolerated.  Progress from supine to sitting/standing  Continue to avoid shoulder extension, especially in abduction and internal rotation  Begin passive internal rotation in the scapular plane, but restrict to less than 45 degrees          Post-operative Phase 3 (Weeks 9-12):    Goals:   1.)  Protect the shoulder arthroplasty  2.)  Continue ROM gains  3.)  Advanced strengthening  4.)  Increase functional activities    Brace Instructions:  None    Weightbearing (WB) status:  No heavy lifting or manual labor  No shoulder extension, excessive stretching or sudden movements  No supporting body weight with the hands    Range of motion (ROM):  Continue previous passive, active assisted range of motion  Advance to active range of motion supine, forward flexion and elevation in the plane of the scapula  Continue to avoid shoulder extension          Post-operative Phase 4 (Weeks 13-18):    Goals:   1.)  Protect the shoulder arthroplasty  2.)  Continue gentle strengthening  3.)  Add functional activities  4.)  Improve neuromuscular control    Brace Instructions:  None    Weightbearing (WB) status:  No forceful pushing or pulling  No lifting greater than 5 pounds with the operative arm  No supporting body weight with the hands    Range of motion (ROM):  Continue full motion with light stretching at extremes        Abdias Ramachandran PA-C  11/10/2023     Electronically signed by Abdias Ramachandran PA-C, 11/10/23, 10:07 AM EST.     EMR Dragon/Transcription disclaimer:  Much of this encounter note is an electronic  transcription/translation of spoken language to printed text. The electronic translation of spoken language may permit erroneous, or at times, nonsensical words or phrases to be inadvertently transcribed; Although I have reviewed the note for such errors, some may still exist.

## 2023-11-10 NOTE — PATIENT INSTRUCTIONS
Post-operative Phase 1 (Weeks 0-6):    Goals:   1.) Ensure wound healing  2.) Protect the repair  3.) Decrease pain     Brace Instructions:  Wear sling/pillow at all times except when showering or performing pendulum exercises.  You should remove the sling and perform pendulum exercises 5 or 6 times per day for 5-10 minutes at a time.  For the first 6 weeks, when lying supine, please place a pillow or a rolled towel behind her arm to limit extension, which places stress on the tissues on the front of your shoulder.  A good rule of thumb is that you should always be able to visualize your elbow when lying down.    Weightbearing (WB) status:  No lifting of objects heavier than a coffee cup.  No shoulder extension or sudden reaching.  No shoulder motion behind the back.    Range of motion (ROM):  NO ACTIVE ROM OF THE SHOULDER.  Full active ROM of elbow, wrist and hand.  No shoulder extension  Icing of the shoulder  For exercise may use stationary bike, elliptical machine, stair stepper          Post-operative Phase 2 (Weeks 7-8):    Goals:   1.)  Protect the shoulder arthroplasty  2.)  Prevent shoulder stiffness  3.)  Improve range of motion  4.)  Begin strengthening    Brace Instructions:  Gradually discontinue use of the sling/pillow    Weightbearing (WB) status:  No lifting of objects heavier than a coffee cup  No excessive stretching or sudden movements  He may begin to use the arm for routine daily activities such as feeding, dressing, bathing and personal hygiene  Continue to avoid shoulder extension  You may raise the arm away from the body, but not while carrying anything heavier than a coffee cup  No forceful pushing or pulling  No supporting her body weight with your hands    Range of motion (ROM):  Continue previous range of motion exercises  Advance active assisted range of motion as tolerated.  Progress from supine to sitting/standing  Continue to avoid shoulder extension, especially in abduction and  internal rotation  Begin passive internal rotation in the scapular plane, but restrict to less than 45 degrees          Post-operative Phase 3 (Weeks 9-12):    Goals:   1.)  Protect the shoulder arthroplasty  2.)  Continue ROM gains  3.)  Advanced strengthening  4.)  Increase functional activities    Brace Instructions:  None    Weightbearing (WB) status:  No heavy lifting or manual labor  No shoulder extension, excessive stretching or sudden movements  No supporting body weight with the hands    Range of motion (ROM):  Continue previous passive, active assisted range of motion  Advance to active range of motion supine, forward flexion and elevation in the plane of the scapula  Continue to avoid shoulder extension          Post-operative Phase 4 (Weeks 13-18):    Goals:   1.)  Protect the shoulder arthroplasty  2.)  Continue gentle strengthening  3.)  Add functional activities  4.)  Improve neuromuscular control    Brace Instructions:  None    Weightbearing (WB) status:  No forceful pushing or pulling  No lifting greater than 5 pounds with the operative arm  No supporting body weight with the hands    Range of motion (ROM):  Continue full motion with light stretching at extremes

## 2023-11-21 ENCOUNTER — PATIENT MESSAGE (OUTPATIENT)
Dept: ORTHOPEDIC SURGERY | Facility: CLINIC | Age: 63
End: 2023-11-21
Payer: OTHER GOVERNMENT

## 2023-11-21 DIAGNOSIS — Z96.611 S/P REVERSE TOTAL SHOULDER ARTHROPLASTY, RIGHT: Primary | ICD-10-CM

## 2023-11-21 DIAGNOSIS — B37.2 YEAST DERMATITIS: ICD-10-CM

## 2023-11-21 RX ORDER — NYSTATIN 100000 [USP'U]/G
POWDER TOPICAL 3 TIMES DAILY
Qty: 60 G | Refills: 0 | Status: SHIPPED | OUTPATIENT
Start: 2023-11-21

## 2023-11-29 ENCOUNTER — HOSPITAL ENCOUNTER (EMERGENCY)
Facility: HOSPITAL | Age: 63
Discharge: HOME | End: 2023-11-29
Attending: EMERGENCY MEDICINE | Admitting: EMERGENCY MEDICINE
Payer: MEDICARE

## 2023-11-29 ENCOUNTER — APPOINTMENT (OUTPATIENT)
Dept: GENERAL RADIOLOGY | Facility: HOSPITAL | Age: 63
End: 2023-11-29
Payer: MEDICARE

## 2023-11-29 VITALS
SYSTOLIC BLOOD PRESSURE: 120 MMHG | OXYGEN SATURATION: 96 % | WEIGHT: 175 LBS | BODY MASS INDEX: 33.04 KG/M2 | HEIGHT: 61 IN | TEMPERATURE: 98.1 F | DIASTOLIC BLOOD PRESSURE: 58 MMHG | RESPIRATION RATE: 18 BRPM | HEART RATE: 101 BPM

## 2023-11-29 DIAGNOSIS — R52 BODY ACHES: Primary | ICD-10-CM

## 2023-11-29 LAB
ANION GAP SERPL CALCULATED.3IONS-SCNC: 12 MMOL/L (ref 5–15)
B PARAPERT DNA SPEC QL NAA+PROBE: NOT DETECTED
B PERT DNA SPEC QL NAA+PROBE: NOT DETECTED
BASOPHILS # BLD AUTO: 0.05 10*3/MM3 (ref 0–0.2)
BASOPHILS NFR BLD AUTO: 0.6 % (ref 0–1.5)
BUN SERPL-MCNC: 7 MG/DL (ref 8–23)
BUN/CREAT SERPL: 9.1 (ref 7–25)
C PNEUM DNA NPH QL NAA+NON-PROBE: NOT DETECTED
CALCIUM SPEC-SCNC: 9.7 MG/DL (ref 8.6–10.5)
CHLORIDE SERPL-SCNC: 98 MMOL/L (ref 98–107)
CO2 SERPL-SCNC: 25 MMOL/L (ref 22–29)
CREAT SERPL-MCNC: 0.77 MG/DL (ref 0.57–1)
D-LACTATE SERPL-SCNC: 1.1 MMOL/L (ref 0.5–2)
DEPRECATED RDW RBC AUTO: 42.4 FL (ref 37–54)
EGFRCR SERPLBLD CKD-EPI 2021: 85.7 ML/MIN/1.73
EOSINOPHIL # BLD AUTO: 0.13 10*3/MM3 (ref 0–0.4)
EOSINOPHIL NFR BLD AUTO: 1.5 % (ref 0.3–6.2)
ERYTHROCYTE [DISTWIDTH] IN BLOOD BY AUTOMATED COUNT: 13.1 % (ref 12.3–15.4)
FLUAV SUBTYP SPEC NAA+PROBE: NOT DETECTED
FLUBV RNA ISLT QL NAA+PROBE: NOT DETECTED
GLUCOSE SERPL-MCNC: 106 MG/DL (ref 65–99)
HADV DNA SPEC NAA+PROBE: NOT DETECTED
HCOV 229E RNA SPEC QL NAA+PROBE: NOT DETECTED
HCOV HKU1 RNA SPEC QL NAA+PROBE: NOT DETECTED
HCOV NL63 RNA SPEC QL NAA+PROBE: NOT DETECTED
HCOV OC43 RNA SPEC QL NAA+PROBE: NOT DETECTED
HCT VFR BLD AUTO: 34.6 % (ref 34–46.6)
HGB BLD-MCNC: 11.6 G/DL (ref 12–15.9)
HMPV RNA NPH QL NAA+NON-PROBE: NOT DETECTED
HPIV1 RNA ISLT QL NAA+PROBE: NOT DETECTED
HPIV2 RNA SPEC QL NAA+PROBE: NOT DETECTED
HPIV3 RNA NPH QL NAA+PROBE: NOT DETECTED
HPIV4 P GENE NPH QL NAA+PROBE: NOT DETECTED
IMM GRANULOCYTES # BLD AUTO: 0.02 10*3/MM3 (ref 0–0.05)
IMM GRANULOCYTES NFR BLD AUTO: 0.2 % (ref 0–0.5)
LYMPHOCYTES # BLD AUTO: 1.18 10*3/MM3 (ref 0.7–3.1)
LYMPHOCYTES NFR BLD AUTO: 14 % (ref 19.6–45.3)
M PNEUMO IGG SER IA-ACNC: NOT DETECTED
MCH RBC QN AUTO: 29.4 PG (ref 26.6–33)
MCHC RBC AUTO-ENTMCNC: 33.5 G/DL (ref 31.5–35.7)
MCV RBC AUTO: 87.6 FL (ref 79–97)
MONOCYTES # BLD AUTO: 0.79 10*3/MM3 (ref 0.1–0.9)
MONOCYTES NFR BLD AUTO: 9.4 % (ref 5–12)
NEUTROPHILS NFR BLD AUTO: 6.24 10*3/MM3 (ref 1.7–7)
NEUTROPHILS NFR BLD AUTO: 74.3 % (ref 42.7–76)
NRBC BLD AUTO-RTO: 0 /100 WBC (ref 0–0.2)
PLATELET # BLD AUTO: 306 10*3/MM3 (ref 140–450)
PMV BLD AUTO: 9.7 FL (ref 6–12)
POTASSIUM SERPL-SCNC: 3.3 MMOL/L (ref 3.5–5.2)
PROCALCITONIN SERPL-MCNC: 0.06 NG/ML (ref 0–0.25)
RBC # BLD AUTO: 3.95 10*6/MM3 (ref 3.77–5.28)
RHINOVIRUS RNA SPEC NAA+PROBE: NOT DETECTED
RSV RNA NPH QL NAA+NON-PROBE: NOT DETECTED
SARS-COV-2 RNA NPH QL NAA+NON-PROBE: NOT DETECTED
SODIUM SERPL-SCNC: 135 MMOL/L (ref 136–145)
WBC NRBC COR # BLD AUTO: 8.41 10*3/MM3 (ref 3.4–10.8)

## 2023-11-29 PROCEDURE — 80048 BASIC METABOLIC PNL TOTAL CA: CPT | Performed by: EMERGENCY MEDICINE

## 2023-11-29 PROCEDURE — 84145 PROCALCITONIN (PCT): CPT | Performed by: EMERGENCY MEDICINE

## 2023-11-29 PROCEDURE — 71045 X-RAY EXAM CHEST 1 VIEW: CPT

## 2023-11-29 PROCEDURE — 83605 ASSAY OF LACTIC ACID: CPT | Performed by: EMERGENCY MEDICINE

## 2023-11-29 PROCEDURE — 0202U NFCT DS 22 TRGT SARS-COV-2: CPT | Performed by: EMERGENCY MEDICINE

## 2023-11-29 PROCEDURE — 85025 COMPLETE CBC W/AUTO DIFF WBC: CPT | Performed by: EMERGENCY MEDICINE

## 2023-11-29 PROCEDURE — 25810000003 SODIUM CHLORIDE 0.9 % SOLUTION: Performed by: EMERGENCY MEDICINE

## 2023-11-29 PROCEDURE — 99283 EMERGENCY DEPT VISIT LOW MDM: CPT

## 2023-11-29 RX ORDER — ACETAMINOPHEN 500 MG
1000 TABLET ORAL ONCE
Status: COMPLETED | OUTPATIENT
Start: 2023-11-29 | End: 2023-11-29

## 2023-11-29 RX ADMIN — SODIUM CHLORIDE 1000 ML: 9 INJECTION, SOLUTION INTRAVENOUS at 10:03

## 2023-11-29 RX ADMIN — ACETAMINOPHEN 1000 MG: 500 TABLET ORAL at 10:04

## 2023-11-29 NOTE — ED TRIAGE NOTES
"Patient to ED via PV from home. Patient c/o \"not feeling well\" and a fever that began this morning. Patient's temperature at time of triage is 99.5. Patient had shoulder surgery on 10-27.  "

## 2023-11-29 NOTE — DISCHARGE INSTRUCTIONS
Continue current medications, stay well-hydrated, outpatient PCP follow-up as needed, ED return for worsening symptoms as needed.

## 2023-11-29 NOTE — ED PROVIDER NOTES
EMERGENCY DEPARTMENT ENCOUNTER    Room Number:  39/39  PCP: Esha Galeana APRN  Historian: Patient      HPI:  Chief Complaint: Fever, body aches  A complete HPI/ROS/PMH/PSH/SH/FH are unobtainable due to: None    Context: Katia Speras is a 63 y.o. female who presents to the ED via private vehicle from home for cute onset of temperature elevation to 99.7 at home with generalized body aches and just not feeling well.  Patient denies any significant congestion, cough, sore throat, chest pain, shortness of breath.  He is 1 month status post right reverse total shoulder states that the shoulder has been healing well with no significant increased pain.  Has been going to physical therapy without significant issues.  Did not take anything for temperature elevation today.  No vomiting or diarrhea.      MEDICAL RECORD REVIEW    External (non-ED) record review: Op note reviewed from October 27, 2023, patient underwent reverse total shoulder arthroplasty of the right shoulder with Dr. Gorman    PAST MEDICAL HISTORY  Active Ambulatory Problems     Diagnosis Date Noted    Anemia 06/15/2021    Anxiety 06/15/2021    COPD (chronic obstructive pulmonary disease) 06/15/2021    Deviated nasal septum 06/15/2021    Encounter for long-term (current) use of other medications 06/15/2021    Fibromyalgia 06/15/2021    HTN (hypertension) 06/15/2021    JE (obstructive sleep apnea) 06/15/2021    Personal history of tobacco use, presenting hazards to health 06/15/2021    Generalized abdominal pain 06/15/2021    Neck pain 06/15/2021    Tachycardia 03/25/2022    Constipation 03/25/2022    Vitamin D deficiency 03/25/2022    Mixed hyperlipidemia 03/25/2022    Asthma 04/12/2023    GERD (gastroesophageal reflux disease) 04/12/2023    Nontraumatic complete tear of right rotator cuff 11/28/2022    History of anemia 04/12/2023    Other fatigue 04/12/2023    Environmental allergies 04/12/2023    Class 1 obesity 05/01/2023    Osteoarthritis of  glenohumeral joint, right 2023    PLMD (periodic limb movement disorder) 2023    Rotator cuff tear arthropathy of right shoulder 10/27/2023     Resolved Ambulatory Problems     Diagnosis Date Noted    Colon cancer screening 2022    Hypertension 2022    Heartburn 2022    Snoring 2023    Hypersomnia 2023    Non-restorative sleep 2023     Past Medical History:   Diagnosis Date    Anemia, unspecified     Arthritis     Arthritis of back     Chronic obstructive pulmonary disease, unspecified     Chronic sinusitis, unspecified     Dysthymic disorder     Essential (primary) hypertension     Fracture of wrist     Frozen shoulder     Generalized anxiety disorder     Hypo-osmolality and hyponatremia     Knee swelling     Obstructive sleep apnea (adult) (pediatric)     Osteopenia     Osteoporosis     Other difficulties with micturition     Other long term (current) drug therapy     Periarthritis of shoulder     Pneumonia 2022    PONV (postoperative nausea and vomiting)     RLS (restless legs syndrome)     Rotator cuff syndrome     Scoliosis     Tear of meniscus of knee     Urinary incontinence in female     Vitamin D deficiency, unspecified          PAST SURGICAL HISTORY  Past Surgical History:   Procedure Laterality Date     SECTION      X2    CHOLECYSTECTOMY      COLONOSCOPY      COLONOSCOPY N/A 2022    Procedure: COLONOSCOPY WITH BIOPSIES, POLYPECTOMIES;  Surgeon: Esha Garcia MD;  Location: [facility] ENDOSCOPY;  Service: Gastroenterology;  Laterality: N/A;  COLON POLYPS    DILATATION AND CURETTAGE      MULTIPLE    ENDOMETRIAL ABLATION  2010    ENDOSCOPY N/A 2022    Procedure: ESOPHAGOGASTRODUODENOSCOPY WITH BIOPSIES;  Surgeon: Esha Garcia MD;  Location: [facility] ENDOSCOPY;  Service: Gastroenterology;  Laterality: N/A;  HIATAL HERNIA    KNEE SURGERY Right     torn meniscus    LEEP  2002    OVARY SURGERY Right      TOTAL SHOULDER ARTHROPLASTY W/ DISTAL CLAVICLE EXCISION Right 10/27/2023    Procedure: Right reverse total shoulder arthroplasty;  Surgeon: Saad Gorman MD;  Location: [facility];  Service: Orthopedics;  Laterality: Right;    TRIGGER POINT INJECTION      WRIST FRACTURE SURGERY Right 2019    hardware         FAMILY HISTORY  Family History   Problem Relation Age of Onset    Other Mother         Multiple System Atrophy    Lung cancer Father     Cancer Father         Lung    Hyperlipidemia Sister     Diabetes type II Sister     Thyroid disease Sister     Rheum arthritis Sister     Sleep apnea Sister     Insomnia Sister     Hyperlipidemia Brother     Schizophrenia Brother     Sleep walking Brother     Diabetes type I Son     Coronary artery disease Other     Cancer Maternal Aunt         Breast    Cancer Maternal Aunt         Breast    Cancer Paternal Aunt         Breast    Diabetes Sister         Type 2    Rheumatologic disease Sister     Malig Hyperthermia Neg Hx          SOCIAL HISTORY  Social History     Socioeconomic History    Marital status:     Number of children: 2   Tobacco Use    Smoking status: Former     Packs/day: 1.00     Years: 18.00     Additional pack years: 0.00     Total pack years: 18.00     Types: Cigarettes     Quit date: 1996     Years since quittin.9    Smokeless tobacco: Never   Vaping Use    Vaping Use: Never used   Substance and Sexual Activity    Alcohol use: Yes     Comment: 1-2 monthly    Drug use: Never    Sexual activity: Defer         ALLERGIES  Patient has no known allergies.        REVIEW OF SYSTEMS  Review of Systems     All systems reviewed and negative except for those discussed in HPI.       PHYSICAL EXAM    I have reviewed the triage vital signs and nursing notes.    ED Triage Vitals   Temp Heart Rate Resp BP SpO2   23 0934 23 0934 23 0934 23 0948 23 0934   99.5 °F (37.5 °C) (!) 135 16 133/75 99 %      Temp src Heart Rate Source  Patient Position BP Location FiO2 (%)   -- -- 11/29/23 0948 -- --     Lying         Physical Exam  General: No acute distress, nontoxic  HEENT: Mucous membranes moist, atraumatic, EOMI  Neck: Full ROM  Pulm: Symmetric chest rise, nonlabored, lungs CTAB  Cardiovascular: Regular rate and rhythm, intact distal pulses  GI: Soft, nontender, nondistended, no rebound, no guarding, bowel sounds present  MSK: Full ROM, no deformity, well-healing right anterior shoulder surgical site  Skin: Warm, dry  Neuro: Awake, alert, oriented x 4, GCS 15, moving all extremities, no focal deficits  Psych: Calm, cooperative        LAB RESULTS  Recent Results (from the past 24 hour(s))   Basic Metabolic Panel    Collection Time: 11/29/23  9:57 AM    Specimen: Blood   Result Value Ref Range    Glucose 106 (H) 65 - 99 mg/dL    BUN 7 (L) 8 - 23 mg/dL    Creatinine 0.77 0.57 - 1.00 mg/dL    Sodium 135 (L) 136 - 145 mmol/L    Potassium 3.3 (L) 3.5 - 5.2 mmol/L    Chloride 98 98 - 107 mmol/L    CO2 25.0 22.0 - 29.0 mmol/L    Calcium 9.7 8.6 - 10.5 mg/dL    BUN/Creatinine Ratio 9.1 7.0 - 25.0    Anion Gap 12.0 5.0 - 15.0 mmol/L    eGFR 85.7 >60.0 mL/min/1.73   Lactic Acid, Plasma    Collection Time: 11/29/23  9:57 AM    Specimen: Blood   Result Value Ref Range    Lactate 1.1 0.5 - 2.0 mmol/L   Procalcitonin    Collection Time: 11/29/23  9:57 AM    Specimen: Blood   Result Value Ref Range    Procalcitonin 0.06 0.00 - 0.25 ng/mL   CBC Auto Differential    Collection Time: 11/29/23  9:57 AM    Specimen: Blood   Result Value Ref Range    WBC 8.41 3.40 - 10.80 10*3/mm3    RBC 3.95 3.77 - 5.28 10*6/mm3    Hemoglobin 11.6 (L) 12.0 - 15.9 g/dL    Hematocrit 34.6 34.0 - 46.6 %    MCV 87.6 79.0 - 97.0 fL    MCH 29.4 26.6 - 33.0 pg    MCHC 33.5 31.5 - 35.7 g/dL    RDW 13.1 12.3 - 15.4 %    RDW-SD 42.4 37.0 - 54.0 fl    MPV 9.7 6.0 - 12.0 fL    Platelets 306 140 - 450 10*3/mm3    Neutrophil % 74.3 42.7 - 76.0 %    Lymphocyte % 14.0 (L) 19.6 - 45.3 %     Monocyte % 9.4 5.0 - 12.0 %    Eosinophil % 1.5 0.3 - 6.2 %    Basophil % 0.6 0.0 - 1.5 %    Immature Grans % 0.2 0.0 - 0.5 %    Neutrophils, Absolute 6.24 1.70 - 7.00 10*3/mm3    Lymphocytes, Absolute 1.18 0.70 - 3.10 10*3/mm3    Monocytes, Absolute 0.79 0.10 - 0.90 10*3/mm3    Eosinophils, Absolute 0.13 0.00 - 0.40 10*3/mm3    Basophils, Absolute 0.05 0.00 - 0.20 10*3/mm3    Immature Grans, Absolute 0.02 0.00 - 0.05 10*3/mm3    nRBC 0.0 0.0 - 0.2 /100 WBC   Respiratory Panel PCR w/COVID-19(SARS-CoV-2) JOHAN/CRYSTAL/JOSE/PAD/COR/JOSHUA In-House, NP Swab in UTM/VTM, 2 HR TAT - Swab, Nasopharynx    Collection Time: 11/29/23  9:58 AM    Specimen: Nasopharynx; Swab   Result Value Ref Range    ADENOVIRUS, PCR Not Detected Not Detected    Coronavirus 229E Not Detected Not Detected    Coronavirus HKU1 Not Detected Not Detected    Coronavirus NL63 Not Detected Not Detected    Coronavirus OC43 Not Detected Not Detected    COVID19 Not Detected Not Detected - Ref. Range    Human Metapneumovirus Not Detected Not Detected    Human Rhinovirus/Enterovirus Not Detected Not Detected    Influenza A PCR Not Detected Not Detected    Influenza B PCR Not Detected Not Detected    Parainfluenza Virus 1 Not Detected Not Detected    Parainfluenza Virus 2 Not Detected Not Detected    Parainfluenza Virus 3 Not Detected Not Detected    Parainfluenza Virus 4 Not Detected Not Detected    RSV, PCR Not Detected Not Detected    Bordetella pertussis pcr Not Detected Not Detected    Bordetella parapertussis PCR Not Detected Not Detected    Chlamydophila pneumoniae PCR Not Detected Not Detected    Mycoplasma pneumo by PCR Not Detected Not Detected       Ordered the above labs and independently interpreted results. My findings will be discussed in the medical decision making section below        RADIOLOGY  XR Chest 1 View    Result Date: 11/29/2023  PORTABLE CHEST X-RAY  HISTORY: Fever and tachycardia, recent shoulder surgery.  Portable chest x-ray acquired at  around 10:13 a.m. was submitted for interpretation after 11:30 a.m. Correlation: None.  FINDINGS: Reverse arthroplasty hardware at the right shoulder. The cardiomediastinal silhouette is normal. The lungs are clear. The costophrenic sulci are dry and the bones appear normal. There is no pneumothorax. Levocurvature of the thoracic spine.      Negative.  This report was finalized on 11/29/2023 11:45 AM by Dr. Steven Baez M.D on Workstation: [employer]       Ordered the above noted radiological studies.  Independently interpreted by me and my independent review of findings can be found in the ED Course.  See dictation for official radiology interpretation.      PROCEDURES    Procedures      MEDICATIONS GIVEN IN ER    Medications   sodium chloride 0.9 % bolus 1,000 mL ( Intravenous Currently Infusing 11/29/23 1139)   acetaminophen (TYLENOL) tablet 1,000 mg (1,000 mg Oral Given 11/29/23 1004)         PROGRESS, DATA ANALYSIS, CONSULTS, AND MEDICAL DECISION MAKING    Please note that this section constitutes my independent interpretation of clinical data including lab results, radiology, EKG's.  This constitutes my independent professional opinion regarding differential diagnosis and management of this patient.  It may include any factors such as history from outside sources, review of external records, social determinants of health, management of medications, response to those treatments, and discussions with other providers.    Differential Diagnosis and Plan: Initial concern for viral process, sepsis given her elevated heart rate although  states that she does typically run fast with her heart rates.  Consideration for dehydration, renal failure, electrolyte abnormalities, among others.  Plan for labs, viral panel, chest x-ray, supportive care, and reevaluation with results.    Additional sources:  - Discussed/ obtained information from independent historians:       - Chronic or social conditions impacting  care:      - Shared decision making:  Patient and  at bedside fully updated on and in agreement with the course and plan moving forward    ED Course as of 11/29/23 1216   Wed Nov 29, 2023   1018 WBC: 8.41 [DC]   1018 Hemoglobin(!): 11.6 [DC]   1018 Platelets: 306 [DC]   1035 Procalcitonin: 0.06 [DC]   1036 Lactate: 1.1 [DC]   1036 Glucose(!): 106 [DC]   1036 BUN(!): 7 [DC]   1036 Creatinine: 0.77 [DC]   1036 Sodium(!): 135 [DC]   1036 Potassium(!): 3.3 [DC]   1059 Respiratory Panel PCR w/COVID-19(SARS-CoV-2) JOHAN/CRYSTAL/JOSE/PAD/COR/JOSHUA In-House, NP Swab in UTM/VTM, 2 HR TAT - Swab, Nasopharynx  Pan-negative [DC]   1153 XR Chest 1 View  Per my independent interpretation of the chest x-ray, no evidence of pneumothorax or pneumonia [DC]   1214 Patient and  updated on the reassuring work-up today, no evidence of any acute emergent abnormalities.  She is symptomatically improved.  Still suspect probably an early viral process but negative testing so far.  Outpatient supportive care recommended, PCP follow-up as needed, ED return for worsening symptoms as needed.  All questions and concerns addressed. [DC]      ED Course User Index  [DC] Zak Bustamante MD       Hospitalization Considered?:     Orders Placed During This Visit:  Orders Placed This Encounter   Procedures    Respiratory Panel PCR w/COVID-19(SARS-CoV-2) JOHAN/CRYSTAL/JOSE/PAD/COR/JOSHUA In-House, NP Swab in UTM/VTM, 2 HR TAT - Swab, Nasopharynx    XR Chest 1 View    Basic Metabolic Panel    Lactic Acid, Plasma    Procalcitonin    CBC Auto Differential    CBC & Differential       Additional orders considered but not placed:      Independent interpretation of labs, radiology studies, and discussions with consultants: See ED Course        AS OF 12:16 EST VITALS:    BP - 118/71  HR - 105  TEMP - 98.1 °F (36.7 °C) (Oral)  02 SATS - 93%        DIAGNOSIS  Final diagnoses:   Body aches         DISPOSITION  DISCHARGE    Patient discharged in stable  condition.    Reviewed implications of results, diagnosis, meds, responsibility to follow up, warning signs and symptoms of possible worsening, potential complications and reasons to return to ER. If your blood pressure > 120/80 please follow up with your primary care provider for further management.    Patient/Family voiced understanding of above instructions.    Discussed plan for discharge, as there is no emergent indication for admission. Pt/family is agreeable and understands need for follow up and repeat testing.  Pt is aware that discharge does not mean that nothing is wrong but it indicates no emergency is present that requires admission and they must continue care with follow-up as given below or physician of their choice.     FOLLOW-UP  [facility] EMERGENCY DEPARTMENT  [address] [facility] [address] [phone]    As needed, If symptoms worsen    Esha Galeana, APRN  [address] [address]  [phone]    Schedule an appointment as soon as possible for a visit   As needed         Medication List        Changed      lisinopril-hydrochlorothiazide 10-12.5 MG per tablet  Commonly known as: PRINZIDE,ZESTORETIC  TAKE 1 TABLET BY MOUTH DAILY  What changed: when to take this     pantoprazole 20 MG EC tablet  Commonly known as: PROTONIX  TAKE 1 TABLET BY MOUTH DAILY  What changed: when to take this                            --    Please note that portions of this were completed with a voice recognition program.       Note Disclaimer: At [facility], we believe that sharing information builds trust and better relationships. You are receiving this note because you are receiving care at [facility] or recently visited. It is possible you will see health information before a provider has talked with you about it. This kind of information can be easy to misunderstand. To help you fully understand what it means for your health, we urge you to  discuss this note with your provider.           Zak Bustamante MD  11/29/23 3434

## 2023-12-01 RX ORDER — DOXYCYCLINE HYCLATE 100 MG/1
100 CAPSULE ORAL 2 TIMES DAILY
Qty: 28 CAPSULE | Refills: 0 | Status: SHIPPED | OUTPATIENT
Start: 2023-12-01

## 2023-12-01 RX ORDER — CLOTRIMAZOLE 1 %
1 CREAM (GRAM) TOPICAL 2 TIMES DAILY
Qty: 60 G | Refills: 1 | Status: SHIPPED | OUTPATIENT
Start: 2023-12-01

## 2023-12-04 ENCOUNTER — TELEPHONE (OUTPATIENT)
Dept: ORTHOPEDIC SURGERY | Facility: CLINIC | Age: 63
End: 2023-12-04

## 2023-12-04 DIAGNOSIS — L08.9 SKIN INFECTION: ICD-10-CM

## 2023-12-04 DIAGNOSIS — Z96.611 S/P REVERSE TOTAL SHOULDER ARTHROPLASTY, RIGHT: Primary | ICD-10-CM

## 2023-12-04 RX ORDER — SULFAMETHOXAZOLE AND TRIMETHOPRIM 800; 160 MG/1; MG/1
1 TABLET ORAL 2 TIMES DAILY
Qty: 28 TABLET | Refills: 0 | Status: SHIPPED | OUTPATIENT
Start: 2023-12-04

## 2023-12-04 NOTE — TELEPHONE ENCOUNTER
HUB AGENT ATTEMPTED NON-CLINICAL & CLINICAL WARM TRANSFERS - NO ANSWERS     PER PRIOR [employer]t MESSAGES, PLEASE CALL / LEAVE VMAIL TO DISCUSS SCHEDULING PATIENT FOR POST OP / INCISION CHECK TODAY (MON 12-04-23) OR TOMORROW (TUES 12-05-23) / RIGHT SHOULDER SURGERY 10-27-23 (DR RAMOS)     PATIENT ALSO USES [facility] # [id number] [phone]) FOR ANTIBIOTIC TO BE SENT     THANKS

## 2023-12-14 ENCOUNTER — OFFICE VISIT (OUTPATIENT)
Dept: ORTHOPEDIC SURGERY | Facility: CLINIC | Age: 63
End: 2023-12-14
Payer: MEDICARE

## 2023-12-14 VITALS — TEMPERATURE: 98.6 F | HEIGHT: 62 IN | WEIGHT: 170 LBS | BODY MASS INDEX: 31.28 KG/M2

## 2023-12-14 DIAGNOSIS — Z96.611 S/P REVERSE TOTAL SHOULDER ARTHROPLASTY, RIGHT: ICD-10-CM

## 2023-12-14 DIAGNOSIS — Z96.611 S/P REVERSE TOTAL SHOULDER ARTHROPLASTY, RIGHT: Primary | ICD-10-CM

## 2023-12-14 DIAGNOSIS — B37.2 YEAST DERMATITIS: ICD-10-CM

## 2023-12-14 RX ORDER — NYSTATIN 100000 [USP'U]/G
POWDER TOPICAL 3 TIMES DAILY
Qty: 60 G | Refills: 2 | Status: SHIPPED | OUTPATIENT
Start: 2023-12-14

## 2023-12-14 NOTE — PROGRESS NOTES
POST OPERATIVE FOLLOW UP ([facility])      NAME: Katia Spears           : 1960    MRN: [id number]      Chief Complaint   Patient presents with    Right Shoulder - Post-op     Date of Surgery: 10/27/2023  ?     HPI  Katia Spears presents for 7 week postoperative follow up s/p right reverse total shoulder arthroplasty performed by Saad Gorman MD. The patient has had a relatively normal postoperative course.  The patient has had no current complaints. The patient has had improving normal postoperative pain.  The patient has had no issues with the wound.         Physical Exam  General: alert, appears stated age, and cooperative  Incision/Skin: healing well, no drainage, no seroma, no swelling, incision well approximated  Musculoskeletal:   RIGHT shoulder  There is no glenohumeral instability. No clicking, popping or catching is noted.   Axillary nerve function is well preserved.   Radial artery pulses are palpable.   Range of motion: passive flexion 0-90 degrees, passive abduction 0-90 degrees.      Diagnostic Data:  no diagnostic testing performed this visit     Assessment & Plan   Assessment:    1. S/P reverse total shoulder arthroplasty, right          Plan:       Wound care instructions given  Ice and/or modalities as beneficial  Watch for signs and symptoms of infection  Weight bearing parameters reviewed  Take prescribed medications as instructed, but only as tolerated  Aftercare and dental prophylaxis for joint replacement surgery.  Discussion of orthopaedic goals and activities and patient and/or guardian expressed appreciation.  Follow up the first week of 2024 with Abdias Ramachandran PA-C         There are no Patient Instructions on file for this visit.       Saad Gorman MD  2023       EMR Dragon/Transcription disclaimer:  Much of this encounter note is an electronic transcription/translation of spoken language to printed text. The electronic translation of spoken language may permit  erroneous, or at times, nonsensical words or phrases to be inadvertently transcribed; Although I have reviewed the note for such errors, some may still exist.   Answers submitted by the patient for this visit:  Other (Submitted on 12/12/2023)  Please describe your symptoms.: Post Op  Have you had these symptoms before?: Yes  How long have you been having these symptoms?: Greater than 2 weeks  Primary Reason for Visit (Submitted on 12/12/2023)  What is the primary reason for your visit?: Other

## 2023-12-18 ENCOUNTER — TELEPHONE (OUTPATIENT)
Dept: FAMILY MEDICINE CLINIC | Age: 63
End: 2023-12-18
Payer: OTHER GOVERNMENT

## 2023-12-18 PROBLEM — Z96.611 S/P REVERSE TOTAL SHOULDER ARTHROPLASTY, RIGHT: Status: ACTIVE | Noted: 2023-12-18

## 2023-12-19 ENCOUNTER — OFFICE VISIT (OUTPATIENT)
Dept: FAMILY MEDICINE CLINIC | Age: 63
End: 2023-12-19
Payer: MEDICARE

## 2023-12-19 VITALS
HEIGHT: 62 IN | OXYGEN SATURATION: 98 % | WEIGHT: 171 LBS | HEART RATE: 87 BPM | SYSTOLIC BLOOD PRESSURE: 118 MMHG | DIASTOLIC BLOOD PRESSURE: 80 MMHG | BODY MASS INDEX: 31.47 KG/M2

## 2023-12-19 DIAGNOSIS — I10 PRIMARY HYPERTENSION: Primary | ICD-10-CM

## 2023-12-19 DIAGNOSIS — E78.2 MIXED HYPERLIPIDEMIA: ICD-10-CM

## 2023-12-19 DIAGNOSIS — Z79.899 ENCOUNTER FOR LONG-TERM (CURRENT) USE OF OTHER MEDICATIONS: ICD-10-CM

## 2023-12-19 DIAGNOSIS — J45.20 MILD INTERMITTENT ASTHMA WITHOUT COMPLICATION: ICD-10-CM

## 2023-12-19 DIAGNOSIS — D50.9 IRON DEFICIENCY ANEMIA, UNSPECIFIED IRON DEFICIENCY ANEMIA TYPE: ICD-10-CM

## 2023-12-19 DIAGNOSIS — K59.04 CHRONIC IDIOPATHIC CONSTIPATION: ICD-10-CM

## 2023-12-19 DIAGNOSIS — F41.9 ANXIETY: ICD-10-CM

## 2023-12-19 DIAGNOSIS — M79.7 FIBROMYALGIA: ICD-10-CM

## 2023-12-19 DIAGNOSIS — Z91.09 ENVIRONMENTAL ALLERGIES: ICD-10-CM

## 2023-12-19 LAB
AMPHET+METHAMPHET UR QL: NEGATIVE
AMPHETAMINES UR QL: NEGATIVE
BARBITURATES UR QL SCN: NEGATIVE
BENZODIAZ UR QL SCN: NEGATIVE
BUPRENORPHINE SERPL-MCNC: NEGATIVE NG/ML
CANNABINOIDS SERPL QL: NEGATIVE
COCAINE UR QL: NEGATIVE
EXPIRATION DATE: NORMAL
Lab: NORMAL
MDMA UR QL SCN: NEGATIVE
METHADONE UR QL SCN: NEGATIVE
MORPHINE/OPIATES SCREEN, URINE: NEGATIVE
OXYCODONE UR QL SCN: NEGATIVE
PCP UR QL SCN: NEGATIVE

## 2023-12-19 RX ORDER — MONTELUKAST SODIUM 10 MG/1
10 TABLET ORAL NIGHTLY
Qty: 90 TABLET | Refills: 1 | Status: SHIPPED | OUTPATIENT
Start: 2023-12-19

## 2023-12-19 RX ORDER — DULOXETIN HYDROCHLORIDE 60 MG/1
60 CAPSULE, DELAYED RELEASE ORAL NIGHTLY
Qty: 90 CAPSULE | Refills: 1 | Status: SHIPPED | OUTPATIENT
Start: 2023-12-19

## 2023-12-19 RX ORDER — LISINOPRIL AND HYDROCHLOROTHIAZIDE 12.5; 1 MG/1; MG/1
1 TABLET ORAL EVERY MORNING
Qty: 90 TABLET | Refills: 1 | Status: SHIPPED | OUTPATIENT
Start: 2023-12-19

## 2023-12-19 NOTE — PROGRESS NOTES
"Chief Complaint  Hypertension (6 month follow up ), Hyperlipidemia, Fibromyalgia, and Vitamin D Deficiency    Subjective          Katia Spears presents to [facility]     Patient is 63-year-old female who is here today to follow-up regarding fibromyalgia.  She had right shoulder surgery in October and is about to complete antibiotics for an infection of the shoulder.  Physical therapy was delayed but is ongoing.  Fibromyalgia stable on baclofen and gabapentin.  Also takes duloxetine 60 mg daily for anxiety and fibromyalgia.  Denies side effects and requests refills.  In 3 weeks she will go to [place] for 3 months.     [facility]  Was told that she had some weakness to her bones and Fosamax was prescribed.  Patient has elected not to start Fosamax at this time.    Reports that her sleep study showed she had periodic limb disorder more than 200 movements per night Dr. Beltran has started her on Klonopin.    For hypertension she takes lisinopril 10 mg with hydrochlorothiazide 12.5 mg daily.  Denies side effects and requests refills.    Asthma and COPD are stable on Singulair 10 mg at bedtime.  Denies side effects and requests refills.    For hyperlipidemia she takes simvastatin 20 mg at bedtime.  With change in routine with shoulder surgery she has not taking it as regularly as she once did.  Denies side effects and requests refills.    Constipation is currently stable on Linzess 145 mcg daily.  Denies side effects and requests refills.    Acid reflux symptoms are stable on pantoprazole 20 mg daily.  Denies side effects and requests refills.      Objective   Vital Signs:   Vitals:    12/19/23 1117   BP: 118/80   BP Location: Left arm   Patient Position: Sitting   Cuff Size: Large Adult   Pulse: 87   SpO2: 98%   Weight: 77.6 kg (171 lb)   Height: 157.5 cm (62\")       Wt Readings from Last 3 Encounters:   12/19/23 77.6 kg (171 lb)   12/14/23 77.1 " kg (170 lb)   11/29/23 79.4 kg (175 lb)      BP Readings from Last 3 Encounters:   12/19/23 118/80   11/29/23 120/58   10/28/23 135/85       Body mass index is 31.28 kg/m².             Physical Exam  Vitals reviewed.   Constitutional:       General: She is not in acute distress.     Appearance: Normal appearance. She is well-developed. She is obese.   Neck:      Thyroid: No thyromegaly.   Cardiovascular:      Rate and Rhythm: Normal rate and regular rhythm.      Pulses:           Posterior tibial pulses are 2+ on the right side and 2+ on the left side.      Heart sounds: Normal heart sounds.   Pulmonary:      Effort: Pulmonary effort is normal.      Breath sounds: Normal breath sounds.   Musculoskeletal:      Right lower leg: No edema.      Left lower leg: No edema.   Skin:     General: Skin is warm and dry.   Neurological:      General: No focal deficit present.      Mental Status: She is alert.   Psychiatric:         Attention and Perception: Attention normal.         Mood and Affect: Mood and affect normal.         Behavior: Behavior normal.           Current Outpatient Medications:     acetaminophen (TYLENOL) 500 MG tablet, Take 1 tablet by mouth Every 6 (Six) Hours As Needed for Mild Pain., Disp: , Rfl:     baclofen (LIORESAL) 10 MG tablet, TAKE 1 TABLET BY MOUTH THREE TIMES DAILY (Patient taking differently: Take 1 tablet by mouth 3 (Three) Times a Day.), Disp: 270 tablet, Rfl: 1    cetirizine (zyrTEC) 10 MG tablet, Take 1 tablet by mouth Every Night., Disp: , Rfl:     clonazePAM (KlonoPIN) 1 MG tablet, Take 2 tablets by mouth Every Night., Disp: 60 tablet, Rfl: 5    clotrimazole (LOTRIMIN) 1 % cream, Apply 1 application  topically to the appropriate area as directed 2 (Two) Times a Day., Disp: 60 g, Rfl: 1    docusate sodium (COLACE) 250 MG capsule, Take 1 capsule by mouth 2 (Two) Times a Day As Needed for Constipation., Disp: 30 capsule, Rfl: 1    docusate sodium 100 MG capsule, Take 1 capsule by mouth 2  (Two) Times a Day., Disp: 60 capsule, Rfl: 0    DULoxetine (CYMBALTA) 60 MG capsule, Take 1 capsule by mouth Every Night., Disp: 90 capsule, Rfl: 1    gabapentin (NEURONTIN) 300 MG capsule, Take 1 capsule by mouth 3 (Three) Times a Day As Needed (fibromyalgia)., Disp: 270 capsule, Rfl: 1    linaclotide (Linzess) 145 MCG capsule capsule, Take 1 capsule by mouth Every Morning Before Breakfast., Disp: 90 capsule, Rfl: 1    lisinopril-hydrochlorothiazide (PRINZIDE,ZESTORETIC) 10-12.5 MG per tablet, Take 1 tablet by mouth Every Morning., Disp: 90 tablet, Rfl: 1    montelukast (SINGULAIR) 10 MG tablet, Take 1 tablet by mouth Every Night., Disp: 90 tablet, Rfl: 1    nystatin (MYCOSTATIN) 900098 UNIT/GM powder, Apply  topically to the appropriate area as directed 3 (Three) Times a Day., Disp: 60 g, Rfl: 2    pantoprazole (PROTONIX) 20 MG EC tablet, TAKE 1 TABLET BY MOUTH DAILY (Patient taking differently: Take 1 tablet by mouth Every Morning.), Disp: 90 tablet, Rfl: 0    simvastatin (ZOCOR) 20 MG tablet, TAKE 1 TABLET BY MOUTH EVERY NIGHT, Disp: 90 tablet, Rfl: 1    traMADol (ULTRAM) 50 MG tablet, 1-2 Oral Q4H PRN severe pain, Disp: 45 tablet, Rfl: 0    tretinoin (RETIN-A) 0.05 % cream, APPLY A THIN LAYER EXTERNALLY TO THE AFFECTED AREA OF FACE EVERY NIGHT AT BEDTIME AS TOLERATED, Disp: , Rfl:    Past Medical History:   Diagnosis Date    Anemia, unspecified     Anxiety     Arthritis     Arthritis of back 2022    Asthma     Chronic obstructive pulmonary disease, unspecified     Chronic sinusitis, unspecified     Colon polyp 2022    Constipation     Deviated nasal septum     Dysthymic disorder     Essential (primary) hypertension     Fibromyalgia     Fibromyalgia, primary     Fracture of wrist 2018    Frozen shoulder     Generalized anxiety disorder     GERD (gastroesophageal reflux disease)     Hypo-osmolality and hyponatremia     Knee swelling     Mixed hyperlipidemia     Obstructive sleep apnea (adult) (pediatric)     cpap     Osteopenia     Osteoporosis     Other difficulties with micturition     Other long term (current) drug therapy     Periarthritis of shoulder 2020    PLMD (periodic limb movement disorder)     Pneumonia 12/2022    PONV (postoperative nausea and vomiting)     RLS (restless legs syndrome)     Rotator cuff syndrome 2020    Scoliosis 2000    Tear of meniscus of knee 2010    Urinary incontinence in female     Vitamin D deficiency, unspecified      Allergies   Allergen Reactions    Wound Dressing Adhesive Other (See Comments)     Blisters                Result Review :     Common labs          10/20/2023    12:20 10/28/2023    04:30 11/29/2023    09:57   Common Labs   Glucose 95  117  106    BUN 11  14  7    Creatinine 0.91  0.86  0.77    Sodium 138  136  135    Potassium 4.4  4.3  3.3    Chloride 102  100  98    Calcium 9.8  8.9  9.7    WBC 4.16   8.41    Hemoglobin 12.7  11.2  11.6    Hematocrit 37.7  33.7  34.6    Platelets 393   306         XR Chest 1 View    Result Date: 11/29/2023  Negative.  This report was finalized on 11/29/2023 11:45 AM by Dr. Steven Baez M.D on Workstation: [id number]      XR Shoulder 2+ View Right    Result Date: 11/10/2023    1. Postsurgical changes of right reverse total shoulder arthroplasty.  No evidence of hardware complication.       VENTURA SALEEM MD       Electronically Signed and Approved By: VENTURA SALEEM MD on 11/10/2023 at 13:42             XR Shoulder 1 View Right    Result Date: 10/27/2023   Postsurgical changes.    This report was finalized on 10/27/2023 9:54 AM by Dr. Delfin Bruce M.D on Workstation: [id number]      XR Shoulder 2+ View Right    Result Date: 10/20/2023  Advanced glenohumeral joint osteoarthritis.  This report was finalized on 10/20/2023 1:42 PM by Dr. Augie Hernández M.D on Workstation: [id number]               Social History     Tobacco Use   Smoking Status Former    Packs/day: 1.00    Years: 18.00    Additional pack years: 0.00    Total pack  years: 18.00    Types: Cigarettes    Quit date: 1996    Years since quittin.9   Smokeless Tobacco Never           Assessment and Plan    Diagnoses and all orders for this visit:    1. Primary hypertension (Primary)  Assessment & Plan:  Monitor blood pressure at home report any elevated readings.  Status is stable.  Further treatment pending lab results    Orders:  -     Comprehensive metabolic panel; Future  -     Lipid panel; Future  -     lisinopril-hydrochlorothiazide (PRINZIDE,ZESTORETIC) 10-12.5 MG per tablet; Take 1 tablet by mouth Every Morning.  Dispense: 90 tablet; Refill: 1    2. Fibromyalgia  -     POC Medline 12 Panel Urine Drug Screen  -     DULoxetine (CYMBALTA) 60 MG capsule; Take 1 capsule by mouth Every Night.  Dispense: 90 capsule; Refill: 1  -     gabapentin (NEURONTIN) 300 MG capsule; Take 1 capsule by mouth 3 (Three) Times a Day As Needed (fibromyalgia).  Dispense: 270 capsule; Refill: 1    3. Encounter for long-term (current) use of other medications  -     POC Medline 12 Panel Urine Drug Screen  -     gabapentin (NEURONTIN) 300 MG capsule; Take 1 capsule by mouth 3 (Three) Times a Day As Needed (fibromyalgia).  Dispense: 270 capsule; Refill: 1    4. Anxiety  -     DULoxetine (CYMBALTA) 60 MG capsule; Take 1 capsule by mouth Every Night.  Dispense: 90 capsule; Refill: 1    5. Mixed hyperlipidemia  -     Lipid panel; Future    6. Mild intermittent asthma without complication  -     montelukast (SINGULAIR) 10 MG tablet; Take 1 tablet by mouth Every Night.  Dispense: 90 tablet; Refill: 1    7. Iron deficiency anemia, unspecified iron deficiency anemia type  -     CBC w AUTO Differential; Future  -     Iron and TIBC; Future    8. Environmental allergies  -     montelukast (SINGULAIR) 10 MG tablet; Take 1 tablet by mouth Every Night.  Dispense: 90 tablet; Refill: 1    9. Chronic idiopathic constipation  -     linaclotide (Linzess) 145 MCG capsule capsule; Take 1 capsule by mouth Every  Morning Before Breakfast.  Dispense: 90 capsule; Refill: 1        Follow Up    No follow-ups on file.  Patient was given instructions and counseling regarding her condition or for health maintenance advice. Please see specific information pulled into the AVS if appropriate.

## 2023-12-20 ENCOUNTER — TELEPHONE (OUTPATIENT)
Dept: FAMILY MEDICINE CLINIC | Age: 63
End: 2023-12-20
Payer: OTHER GOVERNMENT

## 2023-12-20 RX ORDER — GABAPENTIN 300 MG/1
300 CAPSULE ORAL 3 TIMES DAILY PRN
Qty: 270 CAPSULE | Refills: 1 | Status: SHIPPED | OUTPATIENT
Start: 2023-12-20

## 2023-12-20 NOTE — TELEPHONE ENCOUNTER
please request dexa and mammogram/ recent visit note from [facility]     FAXED REQUEST -[phone]

## 2023-12-20 NOTE — ASSESSMENT & PLAN NOTE
Monitor blood pressure at home report any elevated readings.  Status is stable.  Further treatment pending lab results

## 2023-12-26 DIAGNOSIS — K21.9 GASTROESOPHAGEAL REFLUX DISEASE WITHOUT ESOPHAGITIS: ICD-10-CM

## 2023-12-26 RX ORDER — PANTOPRAZOLE SODIUM 20 MG/1
20 TABLET, DELAYED RELEASE ORAL DAILY
Qty: 90 TABLET | Refills: 1 | Status: SHIPPED | OUTPATIENT
Start: 2023-12-26

## 2023-12-28 DIAGNOSIS — Z96.611 S/P REVERSE TOTAL SHOULDER ARTHROPLASTY, RIGHT: Primary | ICD-10-CM

## 2023-12-29 DIAGNOSIS — Z96.611 S/P REVERSE TOTAL SHOULDER ARTHROPLASTY, RIGHT: ICD-10-CM

## 2024-01-04 RX ORDER — TRAMADOL HYDROCHLORIDE 50 MG/1
TABLET ORAL
Qty: 45 TABLET | Refills: 0 | Status: SHIPPED | OUTPATIENT
Start: 2024-01-04

## 2024-01-05 ENCOUNTER — HOSPITAL ENCOUNTER (OUTPATIENT)
Dept: GENERAL RADIOLOGY | Facility: HOSPITAL | Age: 64
Discharge: HOME | End: 2024-01-05
Admitting: PHYSICIAN ASSISTANT
Payer: MEDICARE

## 2024-01-05 ENCOUNTER — OFFICE VISIT (OUTPATIENT)
Dept: ORTHOPEDIC SURGERY | Facility: CLINIC | Age: 64
End: 2024-01-05
Payer: MEDICARE

## 2024-01-05 VITALS — WEIGHT: 167 LBS | BODY MASS INDEX: 30.73 KG/M2 | HEIGHT: 62 IN | TEMPERATURE: 97 F

## 2024-01-05 DIAGNOSIS — Z96.611 S/P REVERSE TOTAL SHOULDER ARTHROPLASTY, RIGHT: Primary | ICD-10-CM

## 2024-01-05 DIAGNOSIS — Z96.611 S/P REVERSE TOTAL SHOULDER ARTHROPLASTY, RIGHT: ICD-10-CM

## 2024-01-05 PROCEDURE — 1159F MED LIST DOCD IN RCRD: CPT | Performed by: PHYSICIAN ASSISTANT

## 2024-01-05 PROCEDURE — 73030 X-RAY EXAM OF SHOULDER: CPT

## 2024-01-05 PROCEDURE — 1160F RVW MEDS BY RX/DR IN RCRD: CPT | Performed by: PHYSICIAN ASSISTANT

## 2024-01-05 PROCEDURE — 99024 POSTOP FOLLOW-UP VISIT: CPT | Performed by: PHYSICIAN ASSISTANT

## 2024-01-05 NOTE — PROGRESS NOTES
POST OPERATIVE FOLLOW UP ([facility])      NAME: Katia Spears           : 1960    MRN: [id number]      Chief Complaint   Patient presents with    Right Shoulder - Post-op     Date of Surgery: 10/27/23  ?     HPI  Katia Spears presents for 10 week follow up s/p right reverse total shoulder arthroplasty performed by Saad Gorman MD. She reports PT advised she was a little behind in her ROM, but she states they took it easy on her when her incision was irritated. She is having difficulty with putting eye drops in her eye with the surgical arm.         Physical Exam  General: alert, appears stated age, cooperative, and no distress  Incision/Skin: healed well without problems  Musculoskeletal:   RIGHT shoulder  She is afebrile  There is no glenohumeral instability. No clicking, popping or catching is noted.   Axillary nerve function is well preserved.   Radial artery pulses are palpable.   Range of motion: forward flexion 100/105 degrees, abduction 90 degrees.      Diagnostic Data:  RIGHT shoulder xrays   4 views were ordered by Abdias Ramachandran PA-C. Performed at [facility] on 11/10/23. Images were independently viewed and interpreted by myself, my impression as follows:  Findings: Expected post operative appearance  Bony Lesion: No  Soft tissues: within normal limits  Joint spaces: WNL  Hardware appropriately positioned: yes  Prior studies available for comparison: yes          Assessment & Plan   Assessment:    1. S/P reverse total shoulder arthroplasty, right            Plan:   Orders Placed This Encounter   Procedures    Ambulatory Referral to Physical Therapy Evaluate and treat (s/p 10 weeks right rev TSA), POST OP            Wound care instructions given--discussed massage to help with scar tissue  Ice and/or modalities as beneficial  Physical therapy referral given--she will begin PT in [place] and I advised her to ask about soft tissue/muscle therapy to help with ROM.  Weight bearing  parameters reviewed  Take prescribed medications as instructed, but only as tolerated  Aftercare and dental prophylaxis for joint replacement surgery.  Discussion of orthopaedic goals and activities and patient and/or guardian expressed appreciation.  Follow up in 3 months, with Dr. Gorman, once she returns from [place]         Patient Instructions   Post-operative Phase 1 (Weeks 0-6):    Goals:   1.) Ensure wound healing  2.) Protect the repair  3.) Decrease pain     Brace Instructions:  Wear sling/pillow at all times except when showering or performing pendulum exercises.  You should remove the sling and perform pendulum exercises 5 or 6 times per day for 5-10 minutes at a time.  For the first 6 weeks, when lying supine, please place a pillow or a rolled towel behind her arm to limit extension, which places stress on the tissues on the front of your shoulder.  A good rule of thumb is that you should always be able to visualize your elbow when lying down.    Weightbearing (WB) status:  No lifting of objects heavier than a coffee cup.  No shoulder extension or sudden reaching.  No shoulder motion behind the back.    Range of motion (ROM):  NO ACTIVE ROM OF THE SHOULDER.  Full active ROM of elbow, wrist and hand.  No shoulder extension  Icing of the shoulder  For exercise may use stationary bike, elliptical machine, stair stepper          Post-operative Phase 2 (Weeks 7-8):    Goals:   1.)  Protect the shoulder arthroplasty  2.)  Prevent shoulder stiffness  3.)  Improve range of motion  4.)  Begin strengthening    Brace Instructions:  Gradually discontinue use of the sling/pillow    Weightbearing (WB) status:  No lifting of objects heavier than a coffee cup  No excessive stretching or sudden movements  He may begin to use the arm for routine daily activities such as feeding, dressing, bathing and personal hygiene  Continue to avoid shoulder extension  You may raise the arm away from the body, but not while carrying  anything heavier than a coffee cup  No forceful pushing or pulling  No supporting her body weight with your hands    Range of motion (ROM):  Continue previous range of motion exercises  Advance active assisted range of motion as tolerated.  Progress from supine to sitting/standing  Continue to avoid shoulder extension, especially in abduction and internal rotation  Begin passive internal rotation in the scapular plane, but restrict to less than 45 degrees          Post-operative Phase 3 (Weeks 9-12):    Goals:   1.)  Protect the shoulder arthroplasty  2.)  Continue ROM gains  3.)  Advanced strengthening  4.)  Increase functional activities    Brace Instructions:  None    Weightbearing (WB) status:  No heavy lifting or manual labor  No shoulder extension, excessive stretching or sudden movements  No supporting body weight with the hands    Range of motion (ROM):  Continue previous passive, active assisted range of motion  Advance to active range of motion supine, forward flexion and elevation in the plane of the scapula  Continue to avoid shoulder extension          Post-operative Phase 4 (Weeks 13-18):    Goals:   1.)  Protect the shoulder arthroplasty  2.)  Continue gentle strengthening  3.)  Add functional activities  4.)  Improve neuromuscular control    Brace Instructions:  None    Weightbearing (WB) status:  No forceful pushing or pulling  No lifting greater than 5 pounds with the operative arm  No supporting body weight with the hands    Range of motion (ROM):  Continue full motion with light stretching at extremes        Abdias Ramachandran PA-C  1/5/2024     Electronically signed by Abdias Ramachandran PA-C, 01/05/24, 12:15 PM EST.     [employer] Dragon/Transcription disclaimer:  Much of this encounter note is an electronic transcription/translation of spoken language to printed text. The electronic translation of spoken language may permit erroneous, or at times, nonsensical words or phrases to be inadvertently  transcribed; Although I have reviewed the note for such errors, some may still exist.

## 2024-01-23 ENCOUNTER — TELEPHONE (OUTPATIENT)
Dept: FAMILY MEDICINE CLINIC | Age: 64
End: 2024-01-23
Payer: OTHER GOVERNMENT

## 2024-02-06 ENCOUNTER — TELEPHONE (OUTPATIENT)
Dept: SLEEP MEDICINE | Facility: HOSPITAL | Age: 64
End: 2024-02-06
Payer: OTHER GOVERNMENT

## 2024-02-06 NOTE — TELEPHONE ENCOUNTER
Message has been sent to Joy to check and see what needs to be done since pt is now on Medicare.  She is being told she needs all new documentation to proceed. I will call pt as soon as they get back to me on the next steps.

## 2024-02-12 ENCOUNTER — TELEPHONE (OUTPATIENT)
Dept: SLEEP MEDICINE | Facility: HOSPITAL | Age: 64
End: 2024-02-12
Payer: OTHER GOVERNMENT

## 2024-03-16 DIAGNOSIS — M79.7 FIBROMYALGIA: ICD-10-CM

## 2024-03-16 DIAGNOSIS — E78.2 MIXED HYPERLIPIDEMIA: ICD-10-CM

## 2024-03-18 DIAGNOSIS — G47.61 PLMD (PERIODIC LIMB MOVEMENT DISORDER): ICD-10-CM

## 2024-03-18 RX ORDER — SIMVASTATIN 20 MG
20 TABLET ORAL NIGHTLY
Qty: 90 TABLET | Refills: 1 | OUTPATIENT
Start: 2024-03-18

## 2024-03-18 RX ORDER — BACLOFEN 10 MG/1
TABLET ORAL
Qty: 270 TABLET | Refills: 1 | OUTPATIENT
Start: 2024-03-18

## 2024-03-18 RX ORDER — CLONAZEPAM 1 MG/1
2 TABLET ORAL NIGHTLY
Qty: 60 TABLET | Refills: 2 | Status: SHIPPED | OUTPATIENT
Start: 2024-03-18 | End: 2024-03-20 | Stop reason: SDUPTHER

## 2024-03-20 DIAGNOSIS — G47.61 PLMD (PERIODIC LIMB MOVEMENT DISORDER): ICD-10-CM

## 2024-03-20 RX ORDER — CLONAZEPAM 1 MG/1
2 TABLET ORAL NIGHTLY
Qty: 60 TABLET | Refills: 1 | Status: SHIPPED | OUTPATIENT
Start: 2024-03-20

## 2024-03-20 RX ORDER — CLONAZEPAM 1 MG/1
2 TABLET ORAL NIGHTLY
Qty: 60 TABLET | Refills: 1 | Status: SHIPPED | OUTPATIENT
Start: 2024-03-20 | End: 2024-03-20 | Stop reason: SDUPTHER

## 2024-03-20 RX ORDER — CLONAZEPAM 1 MG/1
2 TABLET ORAL NIGHTLY
Qty: 60 TABLET | Refills: 2 | Status: CANCELLED | OUTPATIENT
Start: 2024-03-20

## 2024-03-21 ENCOUNTER — TELEPHONE (OUTPATIENT)
Dept: SLEEP MEDICINE | Facility: HOSPITAL | Age: 64
End: 2024-03-21
Payer: OTHER GOVERNMENT

## 2024-03-21 NOTE — TELEPHONE ENCOUNTER
Call to pt.  She was able to  her medication in [place].  She will remind us next year and possibly ask for a 90 day script.

## 2024-04-08 DIAGNOSIS — K59.04 CHRONIC IDIOPATHIC CONSTIPATION: ICD-10-CM

## 2024-04-09 RX ORDER — LINACLOTIDE 145 UG/1
145 CAPSULE, GELATIN COATED ORAL
Qty: 90 CAPSULE | Refills: 1 | Status: SHIPPED | OUTPATIENT
Start: 2024-04-09

## 2024-04-24 DIAGNOSIS — G47.61 PLMD (PERIODIC LIMB MOVEMENT DISORDER): ICD-10-CM

## 2024-04-24 RX ORDER — CLONAZEPAM 1 MG/1
2 TABLET ORAL NIGHTLY
Qty: 60 TABLET | Refills: 0 | Status: SHIPPED | OUTPATIENT
Start: 2024-04-24

## 2024-05-15 ENCOUNTER — OFFICE VISIT (OUTPATIENT)
Dept: SLEEP MEDICINE | Facility: HOSPITAL | Age: 64
End: 2024-05-15
Payer: MEDICARE

## 2024-05-15 VITALS
BODY MASS INDEX: 29.17 KG/M2 | HEART RATE: 76 BPM | WEIGHT: 158.5 LBS | SYSTOLIC BLOOD PRESSURE: 124 MMHG | HEIGHT: 62 IN | DIASTOLIC BLOOD PRESSURE: 78 MMHG | OXYGEN SATURATION: 99 %

## 2024-05-15 DIAGNOSIS — G47.33 OSA ON CPAP: Primary | ICD-10-CM

## 2024-05-15 DIAGNOSIS — G47.61 PLMD (PERIODIC LIMB MOVEMENT DISORDER): ICD-10-CM

## 2024-05-15 PROCEDURE — 1159F MED LIST DOCD IN RCRD: CPT | Performed by: INTERNAL MEDICINE

## 2024-05-15 PROCEDURE — 3078F DIAST BP <80 MM HG: CPT | Performed by: INTERNAL MEDICINE

## 2024-05-15 PROCEDURE — 3074F SYST BP LT 130 MM HG: CPT | Performed by: INTERNAL MEDICINE

## 2024-05-15 PROCEDURE — 99214 OFFICE O/P EST MOD 30 MIN: CPT | Performed by: INTERNAL MEDICINE

## 2024-05-15 PROCEDURE — 1160F RVW MEDS BY RX/DR IN RCRD: CPT | Performed by: INTERNAL MEDICINE

## 2024-05-15 PROCEDURE — G0463 HOSPITAL OUTPT CLINIC VISIT: HCPCS

## 2024-05-15 RX ORDER — ATORVASTATIN CALCIUM 20 MG/1
20 TABLET, FILM COATED ORAL
COMMUNITY

## 2024-05-15 RX ORDER — CLONAZEPAM 1 MG/1
2 TABLET ORAL NIGHTLY
Qty: 60 TABLET | Refills: 5 | Status: SHIPPED | OUTPATIENT
Start: 2024-05-15

## 2024-05-15 RX ORDER — IBUPROFEN 800 MG/1
800 TABLET ORAL
COMMUNITY

## 2024-05-15 RX ORDER — TIZANIDINE HYDROCHLORIDE 4 MG/1
4 CAPSULE, GELATIN COATED ORAL
COMMUNITY

## 2024-05-15 NOTE — PROGRESS NOTES
"  [address] [address]  Phone: [phone]  Fax: [phone]      SLEEP CLINIC FOLLOW UP PROGRESS NOTE.    Katia Spears  [id number]   1960  63 y.o.  female      PCP: Esha Galeana APRN      Date of visit: 5/15/2024    Chief Complaint   Patient presents with    Sleep Apnea    Leg/body Jerks During Sleep       HPI:  This is a 63 y.o. years old patient is here for the management of obstructive sleep apnea.  Sleep apnea is mild in severity with a AHI of 9/hr.Patient is using positive airway pressure therapy and the symptoms of sleep apnea have improved significantly on auto CPAP. Normally patient goes to bed at 1130 PM and wakes up at 8 AM .  The patient wakes up 3 time(s) during the night and has no problem going back to sleep.       Patient also has severe PLMD.  She had a polysomnography which showed PLMS index of 228/h with arousals of 142/h related to PLMS.  She also goes to [place] for few months.  It is becoming difficult for her to get them medications and prescriptions transferred because of its benzo.    Medications and allergies are reviewed by me and documented in the encounter.     SOCIAL ( habits pertaining to sleep medicine)  History of tobacco use:No   History of alcohol use: 1 per week  Caffeine use: 2    REVIEW OF SYSTEMS:   Pertaining positive symptoms:  [place] Sleepiness Scale :Total score: 10   Dry mouth      PHYSICAL EXAMINATION:  CONSTITUTIONAL:  Vitals:    05/15/24 0900   BP: 124/78   Pulse: 76   SpO2: 99%   Weight: 71.9 kg (158 lb 8 oz)   Height: 157.5 cm (62.01\")    Body mass index is 28.98 kg/m².   NOSE: nasal passages are clear, No deformities noted   RESP SYSTEM: Not in any respiratory distress, no chest deformities noted,   CARDIOVASULAR: No edema noted  NEURO: Oriented x 3, gait normal,  Mood and affect appeared appropriate      Data reviewed:  The Smart card downloaded on 5/15/2024 has been reviewed independently by " me for compliance and discussed the data with the patient.   Compliance;70%  More than 4 hr use, 70%  Average use of the device 5 hours  night  Residual AHI: 2.5 /hr (goal < 5.0 /hr)  Mask type: Fullface mask  Device: ResMed  DME: untapt      Ian checked, 5/15/2024      ASSESSMENT AND PLAN:  Obstructive sleep apnea ( G 47.33).  The symptoms of sleep apnea have improved with the device and the treatment.  Patient's compliance with the device is excellent for treatment of sleep apnea.  I have independently reviewed the smart card down load and discussed with the patient the download data and encouarged the patient to continue to use the device.The residual AHI is acceptable. The device is benefiting the patient and the device is medically necessary.  Without proper control of sleep apnea and good compliance there is a increased risk for hypertension, diabetes mellitus and nonrestorative sleep with hypersomnia which can increase risk for motor vehicle accidents.  Untreated sleep apnea is also a risk factor for development of atrial fibrillation, pulmonary hypertension, insulin resistance and stroke. The patient is also instructed to get the supplies from the [employer] company and and change them on a regular basis.  A prescription for supplies has been sent to the [employer] company.  I have also discussed the good sleep hygiene habits and adequate amount of sleep needed for good health.  Severe PLMD, she is on clonazepam 2 mg, she says it has somewhat helped her but still feels restless.  I am going to increase her clonazepam to 2 mg at bedtime sent a prescription for 60 tablets with 5 refills.  I will see her in about 6 months for follow-up.  During her visit in the fall I will try to give a prescription for 3-month supplies so that she can fill these prescriptions before she leaves to [place]  Obesity 1 with BMI is Body mass index is 28.98 kg/m².. I have discuss the relationship between the weight and sleep apnea. The  benefit of weight loss in reducing severity of sleep apnea was discussed. Discussed diet and exercise with the patient to achieve ideal BMI.  Return in about 6 months (around 11/15/2024) for with smart card down load. . Patient's questions were answered.      Kip Beltran MD  Sleep Medicine  Medical Director,   [facility]  5/15/2024

## 2024-06-11 ENCOUNTER — OFFICE VISIT (OUTPATIENT)
Dept: FAMILY MEDICINE CLINIC | Age: 64
End: 2024-06-11
Payer: MEDICARE

## 2024-06-11 ENCOUNTER — HOSPITAL ENCOUNTER (OUTPATIENT)
Dept: GENERAL RADIOLOGY | Facility: HOSPITAL | Age: 64
Discharge: HOME | End: 2024-06-11
Payer: MEDICARE

## 2024-06-11 ENCOUNTER — LAB (OUTPATIENT)
Dept: LAB | Facility: HOSPITAL | Age: 64
End: 2024-06-11
Payer: MEDICARE

## 2024-06-11 VITALS
HEIGHT: 62 IN | WEIGHT: 160.6 LBS | TEMPERATURE: 97.8 F | BODY MASS INDEX: 29.55 KG/M2 | SYSTOLIC BLOOD PRESSURE: 139 MMHG | HEART RATE: 88 BPM | DIASTOLIC BLOOD PRESSURE: 74 MMHG | OXYGEN SATURATION: 100 %

## 2024-06-11 DIAGNOSIS — R10.11 RUQ ABDOMINAL PAIN: Primary | ICD-10-CM

## 2024-06-11 DIAGNOSIS — R10.11 RUQ ABDOMINAL PAIN: ICD-10-CM

## 2024-06-11 LAB
ALBUMIN SERPL-MCNC: 4.1 G/DL (ref 3.5–5.2)
ALBUMIN/GLOB SERPL: 1.5 G/DL
ALP SERPL-CCNC: 140 U/L (ref 39–117)
ALT SERPL W P-5'-P-CCNC: 15 U/L (ref 1–33)
AMYLASE SERPL-CCNC: 63 U/L (ref 28–100)
ANION GAP SERPL CALCULATED.3IONS-SCNC: 9.4 MMOL/L (ref 5–15)
AST SERPL-CCNC: 14 U/L (ref 1–32)
BASOPHILS # BLD AUTO: 0.03 10*3/MM3 (ref 0–0.2)
BASOPHILS NFR BLD AUTO: 0.7 % (ref 0–1.5)
BILIRUB SERPL-MCNC: 0.2 MG/DL (ref 0–1.2)
BILIRUB UR QL STRIP: NEGATIVE
BUN SERPL-MCNC: 12 MG/DL (ref 8–23)
BUN/CREAT SERPL: 14.5 (ref 7–25)
CALCIUM SPEC-SCNC: 9.7 MG/DL (ref 8.6–10.5)
CHLORIDE SERPL-SCNC: 97 MMOL/L (ref 98–107)
CLARITY UR: CLEAR
CO2 SERPL-SCNC: 29.6 MMOL/L (ref 22–29)
COLOR UR: YELLOW
CREAT SERPL-MCNC: 0.83 MG/DL (ref 0.57–1)
DEPRECATED RDW RBC AUTO: 47.2 FL (ref 37–54)
EGFRCR SERPLBLD CKD-EPI 2021: 78.3 ML/MIN/1.73
EOSINOPHIL # BLD AUTO: 0.13 10*3/MM3 (ref 0–0.4)
EOSINOPHIL NFR BLD AUTO: 2.9 % (ref 0.3–6.2)
ERYTHROCYTE [DISTWIDTH] IN BLOOD BY AUTOMATED COUNT: 14.1 % (ref 12.3–15.4)
GLOBULIN UR ELPH-MCNC: 2.8 GM/DL
GLUCOSE SERPL-MCNC: 80 MG/DL (ref 65–99)
GLUCOSE UR STRIP-MCNC: NEGATIVE MG/DL
HCT VFR BLD AUTO: 38.8 % (ref 34–46.6)
HGB BLD-MCNC: 12.4 G/DL (ref 12–15.9)
HGB UR QL STRIP.AUTO: NEGATIVE
IMM GRANULOCYTES # BLD AUTO: 0.01 10*3/MM3 (ref 0–0.05)
IMM GRANULOCYTES NFR BLD AUTO: 0.2 % (ref 0–0.5)
KETONES UR QL STRIP: NEGATIVE
LEUKOCYTE ESTERASE UR QL STRIP.AUTO: NEGATIVE
LIPASE SERPL-CCNC: 44 U/L (ref 13–60)
LYMPHOCYTES # BLD AUTO: 1.49 10*3/MM3 (ref 0.7–3.1)
LYMPHOCYTES NFR BLD AUTO: 33.6 % (ref 19.6–45.3)
MCH RBC QN AUTO: 28.8 PG (ref 26.6–33)
MCHC RBC AUTO-ENTMCNC: 32 G/DL (ref 31.5–35.7)
MCV RBC AUTO: 90 FL (ref 79–97)
MONOCYTES # BLD AUTO: 0.4 10*3/MM3 (ref 0.1–0.9)
MONOCYTES NFR BLD AUTO: 9 % (ref 5–12)
NEUTROPHILS NFR BLD AUTO: 2.38 10*3/MM3 (ref 1.7–7)
NEUTROPHILS NFR BLD AUTO: 53.6 % (ref 42.7–76)
NITRITE UR QL STRIP: NEGATIVE
PH UR STRIP.AUTO: 7 [PH] (ref 5–8)
PLATELET # BLD AUTO: 429 10*3/MM3 (ref 140–450)
PMV BLD AUTO: 9.2 FL (ref 6–12)
POTASSIUM SERPL-SCNC: 3.9 MMOL/L (ref 3.5–5.2)
PROT SERPL-MCNC: 6.9 G/DL (ref 6–8.5)
PROT UR QL STRIP: NEGATIVE
RBC # BLD AUTO: 4.31 10*6/MM3 (ref 3.77–5.28)
SODIUM SERPL-SCNC: 136 MMOL/L (ref 136–145)
SP GR UR STRIP: 1.01 (ref 1–1.03)
UROBILINOGEN UR QL STRIP: NORMAL
WBC NRBC COR # BLD AUTO: 4.44 10*3/MM3 (ref 3.4–10.8)

## 2024-06-11 PROCEDURE — 3078F DIAST BP <80 MM HG: CPT | Performed by: PHYSICIAN ASSISTANT

## 2024-06-11 PROCEDURE — 83690 ASSAY OF LIPASE: CPT

## 2024-06-11 PROCEDURE — 85025 COMPLETE CBC W/AUTO DIFF WBC: CPT

## 2024-06-11 PROCEDURE — 99214 OFFICE O/P EST MOD 30 MIN: CPT | Performed by: PHYSICIAN ASSISTANT

## 2024-06-11 PROCEDURE — 3075F SYST BP GE 130 - 139MM HG: CPT | Performed by: PHYSICIAN ASSISTANT

## 2024-06-11 PROCEDURE — 1125F AMNT PAIN NOTED PAIN PRSNT: CPT | Performed by: PHYSICIAN ASSISTANT

## 2024-06-11 PROCEDURE — 74022 RADEX COMPL AQT ABD SERIES: CPT

## 2024-06-11 PROCEDURE — 82150 ASSAY OF AMYLASE: CPT

## 2024-06-11 PROCEDURE — 1159F MED LIST DOCD IN RCRD: CPT | Performed by: PHYSICIAN ASSISTANT

## 2024-06-11 PROCEDURE — 80053 COMPREHEN METABOLIC PANEL: CPT

## 2024-06-11 PROCEDURE — 1160F RVW MEDS BY RX/DR IN RCRD: CPT | Performed by: PHYSICIAN ASSISTANT

## 2024-06-11 PROCEDURE — 36415 COLL VENOUS BLD VENIPUNCTURE: CPT

## 2024-06-11 PROCEDURE — 81003 URINALYSIS AUTO W/O SCOPE: CPT

## 2024-06-11 NOTE — PROGRESS NOTES
"Subjective     CHIEF COMPLAINT    Chief Complaint   Patient presents with    Abdominal Pain     Right side ongoing over the past year but worsened this past week            History of Present Illness  This is a 63-year-old female presenting to the clinic complaining of right upper quadrant abdominal pain.  She states it initially started last summer and she had \"every kind of scan\" but did not find an answer.  At that point she was having some problems with her right shoulder and ended up having shoulder surgery, so she decided to stop pursuing the abdominal pain for a time.  Unfortunately in the last 1 to 2 weeks the pain has worsened.  She describes it as a constant aching pain that is worsened with movements and deep breathing.  She also has occasional sharp pains that are not necessarily associated with movement.  She said her  made the appointment for her today because he was worried about her.  She most recently had endoscopy and colonoscopy 2 years ago and reports they were normal.  She does have a history of chronic constipation but it is well-managed with Linzess.  Last bowel movement was yesterday.            Review of Systems   Constitutional:  Negative for chills and fever.   Gastrointestinal:  Positive for abdominal distention, abdominal pain and constipation. Negative for blood in stool, diarrhea, nausea and vomiting.   Genitourinary:  Negative for dysuria, frequency and urgency.            Past Medical History:   Diagnosis Date    Anemia, unspecified     Anxiety     Arthritis     Arthritis of back 2022    Asthma     Chronic obstructive pulmonary disease, unspecified     Chronic sinusitis, unspecified     Colon polyp 2022    Constipation     Deviated nasal septum     Dysthymic disorder     Essential (primary) hypertension     Fibromyalgia     Fibromyalgia, primary     Fracture of wrist 2018    Frozen shoulder     Generalized anxiety disorder     GERD (gastroesophageal reflux disease)     " Hypo-osmolality and hyponatremia     Knee swelling     Mixed hyperlipidemia     Obstructive sleep apnea (adult) (pediatric)     cpap    Osteopenia     Osteoporosis     Other difficulties with micturition     Other long term (current) drug therapy     Periarthritis of shoulder     PLMD (periodic limb movement disorder)     Pneumonia 2022    PONV (postoperative nausea and vomiting)     RLS (restless legs syndrome)     Rotator cuff syndrome     Scoliosis 2000    Tear of meniscus of knee     Urinary incontinence in female     Vitamin D deficiency, unspecified             Past Surgical History:   Procedure Laterality Date     SECTION      X2    CHOLECYSTECTOMY      COLONOSCOPY      COLONOSCOPY N/A 2022    Procedure: COLONOSCOPY WITH BIOPSIES, POLYPECTOMIES;  Surgeon: Esha Garcia MD;  Location: [facility] ENDOSCOPY;  Service: Gastroenterology;  Laterality: N/A;  COLON POLYPS    DILATATION AND CURETTAGE      MULTIPLE    ENDOMETRIAL ABLATION      ENDOSCOPY N/A 2022    Procedure: ESOPHAGOGASTRODUODENOSCOPY WITH BIOPSIES;  Surgeon: Esha Garcia MD;  Location: [facility] ENDOSCOPY;  Service: Gastroenterology;  Laterality: N/A;  HIATAL HERNIA    JOINT REPLACEMENT  10/2023    Reverse Shoulder Replacement    KNEE SURGERY Right     torn meniscus    LEEP  2002    OVARY SURGERY Right     TOTAL SHOULDER ARTHROPLASTY W/ DISTAL CLAVICLE EXCISION Right 10/27/2023    Procedure: Right reverse total shoulder arthroplasty;  Surgeon: Saad Gorman MD;  Location: [facility] [place];  Service: Orthopedics;  Laterality: Right;    TRIGGER POINT INJECTION      WRIST FRACTURE SURGERY Right 2019    hardware            Family History   Problem Relation Age of Onset    Other Mother         Multiple System Atrophy    Lung cancer Father     Cancer Father         Lung    Hyperlipidemia Sister     Diabetes type II Sister     Thyroid disease Sister     Rheum arthritis Sister     Sleep apnea Sister      Insomnia Sister     Hyperlipidemia Brother     Schizophrenia Brother     Sleep walking Brother     Diabetes type I Son     Coronary artery disease Other     Cancer Maternal Aunt         Breast    Cancer Maternal Aunt         Breast    Cancer Paternal Aunt         Breast    Diabetes Sister         Type 2    Cancer Sister         Breast    Rheumatologic disease Sister     Malig Hyperthermia Neg Hx             Social History     Socioeconomic History    Marital status:     Number of children: 2   Tobacco Use    Smoking status: Former     Current packs/day: 0.00     Average packs/day: 1 pack/day for 18.0 years (18.0 ttl pk-yrs)     Types: Cigarettes     Start date: 1978     Quit date: 1996     Years since quittin.4    Smokeless tobacco: Never   Vaping Use    Vaping status: Never Used   Substance and Sexual Activity    Alcohol use: Yes     Comment: rare    Drug use: Never    Sexual activity: Yes     Partners: Male     Birth control/protection: Post-menopausal            Allergies   Allergen Reactions    Wound Dressing Adhesive Other (See Comments)     Blisters             Current Outpatient Medications on File Prior to Visit   Medication Sig Dispense Refill    atorvastatin (LIPITOR) 20 MG tablet Take 1 tablet by mouth.      baclofen (LIORESAL) 10 MG tablet TAKE 1 TABLET BY MOUTH THREE TIMES DAILY (Patient taking differently: Take 1 tablet by mouth 3 (Three) Times a Day.) 270 tablet 1    cetirizine (zyrTEC) 10 MG tablet Take 1 tablet by mouth Every Night.      clonazePAM (KlonoPIN) 1 MG tablet Take 2 tablets by mouth Every Night. 60 tablet 5    DULoxetine (CYMBALTA) 60 MG capsule Take 1 capsule by mouth Every Night. 90 capsule 1    gabapentin (NEURONTIN) 300 MG capsule Take 1 capsule by mouth 3 (Three) Times a Day As Needed (fibromyalgia). 270 capsule 1    linaclotide (Linzess) 145 MCG capsule capsule TAKE 1 CAPSULE EVERY MORNING BEFORE BREAKFAST 90 capsule 1    lisinopril-hydrochlorothiazide  "(PRINZIDE,ZESTORETIC) 10-12.5 MG per tablet Take 1 tablet by mouth Every Morning. 90 tablet 1    montelukast (SINGULAIR) 10 MG tablet Take 1 tablet by mouth Every Night. 90 tablet 1    pantoprazole (PROTONIX) 20 MG EC tablet TAKE 1 TABLET BY MOUTH DAILY 90 tablet 1    simvastatin (ZOCOR) 20 MG tablet TAKE 1 TABLET BY MOUTH EVERY NIGHT 90 tablet 1    TiZANidine (ZANAFLEX) 4 MG capsule Take 1 capsule by mouth.      ibuprofen (ADVIL,MOTRIN) 800 MG tablet Take 1 tablet by mouth. (Patient not taking: Reported on 6/11/2024)       No current facility-administered medications on file prior to visit.            /74 (BP Location: Left arm, Patient Position: Sitting, Cuff Size: Adult)   Pulse 88   Temp 97.8 °F (36.6 °C) (Oral)   Ht 157.5 cm (62.01\")   Wt 72.8 kg (160 lb 9.6 oz)   SpO2 100%   BMI 29.36 kg/m²          Objective     Physical Exam  Vitals and nursing note reviewed.   Constitutional:       Appearance: Normal appearance.   Cardiovascular:      Rate and Rhythm: Normal rate and regular rhythm.      Heart sounds: Normal heart sounds.   Pulmonary:      Effort: Pulmonary effort is normal.      Breath sounds: Normal breath sounds.   Abdominal:      General: There is no distension.      Palpations: Abdomen is soft. There is no hepatomegaly or splenomegaly.      Tenderness: There is abdominal tenderness (RUQ). There is no guarding or rebound.   Skin:     General: Skin is warm and dry.      Findings: No rash.   Neurological:      Mental Status: She is alert and oriented to person, place, and time.   Psychiatric:         Mood and Affect: Mood normal.         Behavior: Behavior normal.                Lab Results (last 24 hours)       Procedure Component Value Units Date/Time    Urinalysis With Culture If Indicated - Urine, Clean Catch [[id number]]  (Normal) Collected: 06/11/24 1131    Specimen: Urine, Clean Catch Updated: 06/11/24 1146     Color, UA Yellow     Appearance, UA Clear     pH, UA 7.0     Specific " Gravity, UA 1.015     Glucose, UA Negative     Ketones, UA Negative     Bilirubin, UA Negative     Blood, UA Negative     Protein, UA Negative     Leuk Esterase, UA Negative     Nitrite, UA Negative     Urobilinogen, UA 0.2 E.U./dL    Narrative:      In absence of clinical symptoms, the presence of pyuria, bacteria, and/or nitrites on the urinalysis result does not correlate with infection.  Urine microscopic not indicated.    CBC w AUTO Differential [[id number]]  (Normal) Collected: 06/11/24 1131    Specimen: Blood Updated: 06/11/24 1150    Narrative:      The following orders were created for panel order CBC w AUTO Differential.  Procedure                               Abnormality         Status                     ---------                               -----------         ------                     CBC Auto Differential[[id number]]        Normal              Final result                 Please view results for these tests on the individual orders.    Comprehensive metabolic panel [[id number]] Collected: 06/11/24 1131    Specimen: Blood Updated: 06/11/24 1131    Lipase [[id number]] Collected: 06/11/24 1131    Specimen: Blood Updated: 06/11/24 1131    Amylase [[id number]] Collected: 06/11/24 1131    Specimen: Blood Updated: 06/11/24 1131    CBC Auto Differential [[id number]]  (Normal) Collected: 06/11/24 1131    Specimen: Blood Updated: 06/11/24 1150     WBC 4.44 10*3/mm3      RBC 4.31 10*6/mm3      Hemoglobin 12.4 g/dL      Hematocrit 38.8 %      MCV 90.0 fL      MCH 28.8 pg      MCHC 32.0 g/dL      RDW 14.1 %      RDW-SD 47.2 fl      MPV 9.2 fL      Platelets 429 10*3/mm3      Neutrophil % 53.6 %      Lymphocyte % 33.6 %      Monocyte % 9.0 %      Eosinophil % 2.9 %      Basophil % 0.7 %      Immature Grans % 0.2 %      Neutrophils, Absolute 2.38 10*3/mm3      Lymphocytes, Absolute 1.49 10*3/mm3      Monocytes, Absolute 0.40 10*3/mm3      Eosinophils, Absolute 0.13 10*3/mm3      Basophils, Absolute 0.03 10*3/mm3       Immature Grans, Absolute 0.01 10*3/mm3                   No Radiology Exams Resulted Within Past 24 Hours                    Assessment & Plan  RUQ abdominal pain  Will check labs and imaging as below.  The goal of of this appointment, as discussed with patient, is to rule out any acute causes of the change in her pain over the last 1 to 2 weeks.  I stressed we are unlikely to come up with a diagnosis for the chronic pain today and she has expressed understanding.  She has regularly scheduled appointment with PCP next week and may ultimately benefit from gastroenterology referral, at discretion of PCP.    Orders Placed This Encounter   Procedures    XR Abdomen 2 View With Chest 1 View    Comprehensive metabolic panel    Lipase    Amylase    Urinalysis With Culture If Indicated -    CBC w AUTO Differential                   FOR FULL DISCHARGE INSTRUCTIONS/COMMENTS/HANDOUTS please see the   AVS

## 2024-06-19 ENCOUNTER — OFFICE VISIT (OUTPATIENT)
Dept: FAMILY MEDICINE CLINIC | Age: 64
End: 2024-06-19
Payer: MEDICARE

## 2024-06-19 VITALS
BODY MASS INDEX: 29.81 KG/M2 | HEART RATE: 63 BPM | DIASTOLIC BLOOD PRESSURE: 85 MMHG | HEIGHT: 62 IN | SYSTOLIC BLOOD PRESSURE: 133 MMHG | OXYGEN SATURATION: 99 % | WEIGHT: 162 LBS

## 2024-06-19 DIAGNOSIS — J45.20 MILD INTERMITTENT ASTHMA WITHOUT COMPLICATION: ICD-10-CM

## 2024-06-19 DIAGNOSIS — F41.9 ANXIETY: ICD-10-CM

## 2024-06-19 DIAGNOSIS — K21.9 GASTROESOPHAGEAL REFLUX DISEASE WITHOUT ESOPHAGITIS: ICD-10-CM

## 2024-06-19 DIAGNOSIS — M79.7 FIBROMYALGIA: ICD-10-CM

## 2024-06-19 DIAGNOSIS — Z79.899 ENCOUNTER FOR LONG-TERM (CURRENT) USE OF OTHER MEDICATIONS: ICD-10-CM

## 2024-06-19 DIAGNOSIS — Z91.09 ENVIRONMENTAL ALLERGIES: ICD-10-CM

## 2024-06-19 DIAGNOSIS — E78.2 MIXED HYPERLIPIDEMIA: ICD-10-CM

## 2024-06-19 DIAGNOSIS — I10 PRIMARY HYPERTENSION: Primary | ICD-10-CM

## 2024-06-19 DIAGNOSIS — Z11.59 SCREENING FOR VIRAL DISEASE: ICD-10-CM

## 2024-06-19 DIAGNOSIS — R10.11 RIGHT UPPER QUADRANT PAIN: ICD-10-CM

## 2024-06-19 DIAGNOSIS — E55.9 VITAMIN D DEFICIENCY: ICD-10-CM

## 2024-06-19 PROCEDURE — 80305 DRUG TEST PRSMV DIR OPT OBS: CPT | Performed by: NURSE PRACTITIONER

## 2024-06-19 PROCEDURE — 99214 OFFICE O/P EST MOD 30 MIN: CPT | Performed by: NURSE PRACTITIONER

## 2024-06-19 PROCEDURE — 1159F MED LIST DOCD IN RCRD: CPT | Performed by: NURSE PRACTITIONER

## 2024-06-19 PROCEDURE — 3079F DIAST BP 80-89 MM HG: CPT | Performed by: NURSE PRACTITIONER

## 2024-06-19 PROCEDURE — 1125F AMNT PAIN NOTED PAIN PRSNT: CPT | Performed by: NURSE PRACTITIONER

## 2024-06-19 PROCEDURE — 3075F SYST BP GE 130 - 139MM HG: CPT | Performed by: NURSE PRACTITIONER

## 2024-06-19 PROCEDURE — 1160F RVW MEDS BY RX/DR IN RCRD: CPT | Performed by: NURSE PRACTITIONER

## 2024-06-19 RX ORDER — GABAPENTIN 300 MG/1
300 CAPSULE ORAL 3 TIMES DAILY PRN
Qty: 270 CAPSULE | Refills: 1 | Status: SHIPPED | OUTPATIENT
Start: 2024-06-19

## 2024-06-19 RX ORDER — BACLOFEN 10 MG/1
10 TABLET ORAL 3 TIMES DAILY
Qty: 270 TABLET | Refills: 1 | Status: SHIPPED | OUTPATIENT
Start: 2024-06-19

## 2024-06-19 RX ORDER — PANTOPRAZOLE SODIUM 40 MG/1
40 TABLET, DELAYED RELEASE ORAL DAILY
Qty: 30 TABLET | Refills: 2 | Status: SHIPPED | OUTPATIENT
Start: 2024-06-19

## 2024-06-19 RX ORDER — DULOXETIN HYDROCHLORIDE 60 MG/1
60 CAPSULE, DELAYED RELEASE ORAL NIGHTLY
Qty: 90 CAPSULE | Refills: 1 | Status: SHIPPED | OUTPATIENT
Start: 2024-06-19

## 2024-06-19 RX ORDER — LISINOPRIL AND HYDROCHLOROTHIAZIDE 12.5; 1 MG/1; MG/1
1 TABLET ORAL EVERY MORNING
Qty: 90 TABLET | Refills: 1 | Status: SHIPPED | OUTPATIENT
Start: 2024-06-19

## 2024-06-19 RX ORDER — MONTELUKAST SODIUM 10 MG/1
10 TABLET ORAL NIGHTLY
Qty: 90 TABLET | Refills: 1 | Status: SHIPPED | OUTPATIENT
Start: 2024-06-19

## 2024-06-19 NOTE — PROGRESS NOTES
Chief Complaint  Hypertension (6 month follow up ), Hyperlipidemia, Anxiety, and Fibromyalgia    Subjective          Katia Spears presents to [facility]     Patient is a 63-year-old female who is here today to follow-up regarding fibromyalgia.  Currently managed with baclofen 10 mg 3 times daily and gabapentin 300 mg 3 times daily as needed.  Denies side effects and requests refills.  Received Klonopin from Dr. Betlran with sleep medicine for periodic limb movements disorder.  Takes duloxetine 60 mg nightly for fibromyalgia and anxiety.  Denies side effects and requests refills.    Reports blood pressures under good control on lisinopril 10 mg with hydrochlorothiazide 12.5 mg daily.  Denies side effects and requests refills.    Constipation is stabl with use of Linzess 145 mcg daily.  Denies side effects and no refills needed today.      Patient elected to stop simvastatin approximately 2 months ago as she wanted to make sure it does not aggravate any muscle pain due to fibromyalgia.  Has not noted a notable difference but wants to see how she will do with lifestyle management of hyperlipidemia.  We will have fasting lab work done 1 week prior to her next appointment.  She also could consider psyllium fiber supplement over-the-counter.    Dayanna no allergies are stable with use of Singulair 10 mg daily.  Denies side effects and requests refills.    Intermittent right upper quadrant pain that was a cyst as recently as June 11.  She did have a mild elevated alkaline phosphatase level but pancreatic and other liver enzymes were normal.  History of gallbladder being removed and a small hiatal hernia on EGD.  6 polyps removed on colonoscopy August 2022.  Follow-up colonoscopy will be done next year.  Ultrasound of the abdomen May 2023 noted mild fatty liver and granulomas of the spleen.  Patient denies any history of fevers or illnesses or known issues related to her spleen.  CT of  "the abdomen and pelvis done June 2023 noted some fluid in the pelvis and a mildly dilated left ureteral junction.  Denies any urinary symptoms, nausea, vomiting, or diarrhea.  She is on pantoprazole 20 mg daily.  Deferred further workup last year due to issues with her shoulder surgery but that is now complete.  Patient is agreeable to referral to gastroenterology.    History of vitamin D deficiency with supplementation taken more than 50% of the time.  Objective   Vital Signs:   Vitals:    06/19/24 1130   BP: 133/85   BP Location: Left arm   Patient Position: Sitting   Cuff Size: Adult   Pulse: 63   SpO2: 99%   Weight: 73.5 kg (162 lb)   Height: 157.5 cm (62\")       Wt Readings from Last 3 Encounters:   06/19/24 73.5 kg (162 lb)   06/11/24 72.8 kg (160 lb 9.6 oz)   05/15/24 71.9 kg (158 lb 8 oz)      BP Readings from Last 3 Encounters:   06/19/24 133/85   06/11/24 139/74   05/15/24 124/78       Body mass index is 29.63 kg/m².             Physical Exam  Vitals reviewed.   Constitutional:       General: She is not in acute distress.     Appearance: Normal appearance. She is well-developed.   Neck:      Thyroid: No thyromegaly.      Vascular: No carotid bruit.   Cardiovascular:      Rate and Rhythm: Normal rate and regular rhythm.      Heart sounds: Normal heart sounds.   Pulmonary:      Effort: Pulmonary effort is normal.      Breath sounds: Normal breath sounds.   Abdominal:      General: Abdomen is flat. Bowel sounds are normal. There is no distension.      Palpations: Abdomen is soft. There is no mass.      Tenderness: There is no abdominal tenderness. There is no guarding or rebound.   Musculoskeletal:      Right lower leg: No edema.      Left lower leg: No edema.   Skin:     General: Skin is warm and dry.   Neurological:      General: No focal deficit present.      Mental Status: She is alert.   Psychiatric:         Attention and Perception: Attention normal.         Mood and Affect: Mood and affect normal.    "      Behavior: Behavior normal.           Current Outpatient Medications:     baclofen (LIORESAL) 10 MG tablet, Take 1 tablet by mouth 3 (Three) Times a Day., Disp: 270 tablet, Rfl: 1    clonazePAM (KlonoPIN) 1 MG tablet, Take 2 tablets by mouth Every Night., Disp: 60 tablet, Rfl: 5    DULoxetine (CYMBALTA) 60 MG capsule, Take 1 capsule by mouth Every Night., Disp: 90 capsule, Rfl: 1    gabapentin (NEURONTIN) 300 MG capsule, Take 1 capsule by mouth 3 (Three) Times a Day As Needed (fibromyalgia)., Disp: 270 capsule, Rfl: 1    ibuprofen (ADVIL,MOTRIN) 800 MG tablet, Take 1 tablet by mouth., Disp: , Rfl:     linaclotide (Linzess) 145 MCG capsule capsule, TAKE 1 CAPSULE EVERY MORNING BEFORE BREAKFAST, Disp: 90 capsule, Rfl: 1    lisinopril-hydrochlorothiazide (PRINZIDE,ZESTORETIC) 10-12.5 MG per tablet, Take 1 tablet by mouth Every Morning., Disp: 90 tablet, Rfl: 1    montelukast (SINGULAIR) 10 MG tablet, Take 1 tablet by mouth Every Night., Disp: 90 tablet, Rfl: 1    cetirizine (zyrTEC) 10 MG tablet, Take 1 tablet by mouth Every Night., Disp: , Rfl:     pantoprazole (Protonix) 40 MG EC tablet, Take 1 tablet by mouth Daily., Disp: 30 tablet, Rfl: 2   Past Medical History:   Diagnosis Date    Anemia, unspecified     Anxiety     Arthritis     Arthritis of back 2022    Asthma     Chronic obstructive pulmonary disease, unspecified     Chronic sinusitis, unspecified     Colon polyp 2022    Constipation     Deviated nasal septum     Dysthymic disorder     Essential (primary) hypertension     Fibromyalgia     Fibromyalgia, primary     Fracture of wrist 2018    Frozen shoulder     Generalized anxiety disorder     GERD (gastroesophageal reflux disease)     Hypo-osmolality and hyponatremia     Knee swelling     Mixed hyperlipidemia     Obstructive sleep apnea (adult) (pediatric)     cpap    Osteopenia     Osteoporosis     Other difficulties with micturition     Other long term (current) drug therapy     Periarthritis of shoulder      PLMD (periodic limb movement disorder)     Pneumonia 2022    PONV (postoperative nausea and vomiting)     RLS (restless legs syndrome)     Rotator cuff syndrome     Scoliosis 2000    Tear of meniscus of knee     Urinary incontinence in female     Vitamin D deficiency, unspecified      Allergies   Allergen Reactions    Wound Dressing Adhesive Other (See Comments)     Blisters                Result Review :     Common labs          10/28/2023    04:30 2023    09:57 2024    11:31   Common Labs   Glucose 117  106  80    BUN 14  7  12    Creatinine 0.86  0.77  0.83    Sodium 136  135  136    Potassium 4.3  3.3  3.9    Chloride 100  98  97    Calcium 8.9  9.7  9.7    Albumin   4.1    Total Bilirubin   0.2    Alkaline Phosphatase   140    AST (SGOT)   14    ALT (SGPT)   15    WBC  8.41  4.44    Hemoglobin 11.2  11.6  12.4    Hematocrit 33.7  34.6  38.8    Platelets  306  429         XR Abdomen 2+ VW with Chest 1 VW    Result Date: 2024  Impression: 1.No acute pulmonary process. 2.Nonspecific bowel gas pattern. 3.Scoliosis Electronically Signed: Tobias Ramon MD  2024 1:12 PM EDT  Workstation ID: [id number]             Social History     Tobacco Use   Smoking Status Former    Current packs/day: 0.00    Average packs/day: 1 pack/day for 18.0 years (18.0 ttl pk-yrs)    Types: Cigarettes    Start date: 1978    Quit date: 1996    Years since quittin.4   Smokeless Tobacco Never           Assessment and Plan    Diagnoses and all orders for this visit:    1. Primary hypertension (Primary)  Assessment & Plan:  Monitor blood pressure at home reporting elevated readings.  Recheck labs in a couple of months.  Further treatment recommendations pending lab results.  Follow-up in November or sooner if concerns.    Orders:  -     lisinopril-hydrochlorothiazide (PRINZIDE,ZESTORETIC) 10-12.5 MG per tablet; Take 1 tablet by mouth Every Morning.  Dispense: 90 tablet; Refill: 1  -     Lipid  panel; Future  -     Comprehensive metabolic panel; Future    2. Fibromyalgia  -     POC Medline 12 Panel Urine Drug Screen  -     DULoxetine (CYMBALTA) 60 MG capsule; Take 1 capsule by mouth Every Night.  Dispense: 90 capsule; Refill: 1  -     baclofen (LIORESAL) 10 MG tablet; Take 1 tablet by mouth 3 (Three) Times a Day.  Dispense: 270 tablet; Refill: 1  -     gabapentin (NEURONTIN) 300 MG capsule; Take 1 capsule by mouth 3 (Three) Times a Day As Needed (fibromyalgia).  Dispense: 270 capsule; Refill: 1    3. Encounter for long-term (current) use of other medications  -     POC Medline 12 Panel Urine Drug Screen  -     gabapentin (NEURONTIN) 300 MG capsule; Take 1 capsule by mouth 3 (Three) Times a Day As Needed (fibromyalgia).  Dispense: 270 capsule; Refill: 1    4. Anxiety  -     DULoxetine (CYMBALTA) 60 MG capsule; Take 1 capsule by mouth Every Night.  Dispense: 90 capsule; Refill: 1    5. Right upper quadrant pain  -     pantoprazole (Protonix) 40 MG EC tablet; Take 1 tablet by mouth Daily.  Dispense: 30 tablet; Refill: 2  -     Ambulatory Referral to Gastroenterology    6. Mild intermittent asthma without complication  -     montelukast (SINGULAIR) 10 MG tablet; Take 1 tablet by mouth Every Night.  Dispense: 90 tablet; Refill: 1    7. Environmental allergies  -     montelukast (SINGULAIR) 10 MG tablet; Take 1 tablet by mouth Every Night.  Dispense: 90 tablet; Refill: 1    8. Gastroesophageal reflux disease without esophagitis  -     pantoprazole (Protonix) 40 MG EC tablet; Take 1 tablet by mouth Daily.  Dispense: 30 tablet; Refill: 2  -     Ambulatory Referral to Gastroenterology    9. Vitamin D deficiency  -     Vitamin D 25 hydroxy; Future    10. Mixed hyperlipidemia  -     Lipid panel; Future  -     Comprehensive metabolic panel; Future    11. Screening for viral disease  -     Hepatitis C antibody; Future        Follow Up    No follow-ups on file.  Patient was given instructions and counseling regarding  her condition or for health maintenance advice. Please see specific information pulled into the AVS if appropriate.

## 2024-06-19 NOTE — ASSESSMENT & PLAN NOTE
Monitor blood pressure at home reporting elevated readings.  Recheck labs in a couple of months.  Further treatment recommendations pending lab results.  Follow-up in November or sooner if concerns.

## 2024-07-24 ENCOUNTER — TELEPHONE (OUTPATIENT)
Dept: FAMILY MEDICINE CLINIC | Age: 64
End: 2024-07-24
Payer: OTHER GOVERNMENT

## 2024-08-08 DIAGNOSIS — G47.61 PLMD (PERIODIC LIMB MOVEMENT DISORDER): ICD-10-CM

## 2024-08-08 RX ORDER — CLONAZEPAM 1 MG/1
2 TABLET ORAL NIGHTLY
Qty: 180 TABLET | Refills: 1 | Status: SHIPPED | OUTPATIENT
Start: 2024-08-08

## 2024-08-15 ENCOUNTER — TELEMEDICINE (OUTPATIENT)
Dept: FAMILY MEDICINE CLINIC | Age: 64
End: 2024-08-15
Payer: OTHER GOVERNMENT

## 2024-08-15 VITALS — TEMPERATURE: 101.3 F

## 2024-08-15 DIAGNOSIS — U07.1 COVID-19 VIRUS INFECTION: Primary | ICD-10-CM

## 2024-08-15 PROCEDURE — 99213 OFFICE O/P EST LOW 20 MIN: CPT | Performed by: FAMILY MEDICINE

## 2024-08-15 NOTE — PROGRESS NOTES
Chief Complaint  Covid-19 Home Monitoring Video Visit (Tested at , positive 8/15/Doximity [id number])    Subjective          Katia Spears presents to [facility]  History of Present Illness    Telehealth visit with patient using video and audio.  Patient gives permission with understanding of billing.  Patient is at their home and I am at the office.    Began having symptoms two days ago  Yesterday fever around 99 degrees  Not much coughing  Feels fatigued    She has not been immunized and has not had a previous COVID infection as far she knows.    Current Outpatient Medications on File Prior to Visit   Medication Sig Dispense Refill    baclofen (LIORESAL) 10 MG tablet Take 1 tablet by mouth 3 (Three) Times a Day. 270 tablet 1    clonazePAM (KlonoPIN) 1 MG tablet Take 2 tablets by mouth Every Night. 180 tablet 1    DULoxetine (CYMBALTA) 60 MG capsule Take 1 capsule by mouth Every Night. 90 capsule 1    gabapentin (NEURONTIN) 300 MG capsule Take 1 capsule by mouth 3 (Three) Times a Day As Needed (fibromyalgia). 270 capsule 1    ibuprofen (ADVIL,MOTRIN) 800 MG tablet Take 1 tablet by mouth.      linaclotide (Linzess) 145 MCG capsule capsule TAKE 1 CAPSULE EVERY MORNING BEFORE BREAKFAST 90 capsule 1    lisinopril-hydrochlorothiazide (PRINZIDE,ZESTORETIC) 10-12.5 MG per tablet Take 1 tablet by mouth Every Morning. 90 tablet 1    pantoprazole (Protonix) 40 MG EC tablet Take 1 tablet by mouth Daily. 30 tablet 2    cetirizine (zyrTEC) 10 MG tablet Take 1 tablet by mouth Every Night. (Patient not taking: Reported on 8/15/2024)      montelukast (SINGULAIR) 10 MG tablet Take 1 tablet by mouth Every Night. (Patient not taking: Reported on 8/15/2024) 90 tablet 1     No current facility-administered medications on file prior to visit.       Review of Systems         Objective   Vital Signs:   Temp (!) 101.3 °F (38.5 °C) (Oral)     Physical Exam   Does not appear toxic  No labored breathing  No  coughing during the time of the visit  Color is good  Speech is strong  Patient awake alert good cognition    Result Review :                     Assessment and Plan    Diagnoses and all orders for this visit:    1. COVID-19 virus infection (Primary)  Assessment & Plan:  Going to treat her with Paxlovid.  Have her to lower the clonazepam to 1/2 tablet nightly while she is on the Paxlovid and for 3 additional days.    Reviewed CDC isolation/quarantine guidelines with her.  Reviewed EUA status of Paxlovid and she desires to proceed with treatment.  Seek urgent medical evaluation if difficulty breathing, blue around the lips, or chest pain.  We are available 24/7 to address any questions or concerns     Orders:  -     Nirmatrelvir & Ritonavir, 300mg/100mg, (PAXLOVID); Take 3 tablets by mouth 2 (Two) Times a Day for 5 days.  Dispense: 30 tablet; Refill: 0        Follow Up   No follow-ups on file.  Patient was given instructions and counseling regarding her condition or for health maintenance advice. Please see specific information pulled into the AVS if appropriate.

## 2024-09-03 ENCOUNTER — TELEPHONE (OUTPATIENT)
Dept: GASTROENTEROLOGY | Facility: CLINIC | Age: 64
End: 2024-09-03
Payer: OTHER GOVERNMENT

## 2024-09-03 NOTE — TELEPHONE ENCOUNTER
Attempted to contact patient about new patient appointment scheduled for 12/30/2024. Due to a change in the days that Samara will be in [place] we are needing to get this rescheduled at this time we do have a few sooner appointment. Please contact the office to get this rescheduled.

## 2024-09-11 DIAGNOSIS — K21.9 GASTROESOPHAGEAL REFLUX DISEASE WITHOUT ESOPHAGITIS: ICD-10-CM

## 2024-09-11 DIAGNOSIS — R10.11 RIGHT UPPER QUADRANT PAIN: ICD-10-CM

## 2024-09-12 RX ORDER — PANTOPRAZOLE SODIUM 40 MG/1
40 TABLET, DELAYED RELEASE ORAL DAILY
Qty: 30 TABLET | Refills: 2 | Status: SHIPPED | OUTPATIENT
Start: 2024-09-12

## 2024-10-06 DIAGNOSIS — K59.04 CHRONIC IDIOPATHIC CONSTIPATION: ICD-10-CM

## 2024-10-07 RX ORDER — LINACLOTIDE 145 UG/1
145 CAPSULE, GELATIN COATED ORAL
Qty: 90 CAPSULE | Refills: 1 | Status: SHIPPED | OUTPATIENT
Start: 2024-10-07

## 2024-10-11 ENCOUNTER — PRIOR AUTHORIZATION (OUTPATIENT)
Dept: FAMILY MEDICINE CLINIC | Age: 64
End: 2024-10-11
Payer: OTHER GOVERNMENT

## 2024-10-28 NOTE — ASSESSMENT & PLAN NOTE
Going to treat her with Paxlovid.  Have her to lower the clonazepam to 1/2 tablet nightly while she is on the Paxlovid and for 3 additional days.    Reviewed CDC isolation/quarantine guidelines with her.  Reviewed EUA status of Paxlovid and she desires to proceed with treatment.  Seek urgent medical evaluation if difficulty breathing, blue around the lips, or chest pain.  We are available 24/7 to address any questions or concerns    Discharged

## 2024-10-29 ENCOUNTER — LAB (OUTPATIENT)
Dept: LAB | Facility: HOSPITAL | Age: 64
End: 2024-10-29
Payer: MEDICARE

## 2024-10-29 DIAGNOSIS — E55.9 VITAMIN D DEFICIENCY: ICD-10-CM

## 2024-10-29 DIAGNOSIS — I10 PRIMARY HYPERTENSION: ICD-10-CM

## 2024-10-29 DIAGNOSIS — D50.9 IRON DEFICIENCY ANEMIA, UNSPECIFIED IRON DEFICIENCY ANEMIA TYPE: ICD-10-CM

## 2024-10-29 DIAGNOSIS — Z11.59 SCREENING FOR VIRAL DISEASE: ICD-10-CM

## 2024-10-29 DIAGNOSIS — E78.2 MIXED HYPERLIPIDEMIA: ICD-10-CM

## 2024-10-29 LAB
25(OH)D3 SERPL-MCNC: 32.7 NG/ML (ref 30–100)
ALBUMIN SERPL-MCNC: 4.2 G/DL (ref 3.5–5.2)
ALBUMIN/GLOB SERPL: 1.7 G/DL
ALP SERPL-CCNC: 126 U/L (ref 39–117)
ALT SERPL W P-5'-P-CCNC: 15 U/L (ref 1–33)
ANION GAP SERPL CALCULATED.3IONS-SCNC: 5.8 MMOL/L (ref 5–15)
AST SERPL-CCNC: 20 U/L (ref 1–32)
BASOPHILS # BLD AUTO: 0.06 10*3/MM3 (ref 0–0.2)
BASOPHILS NFR BLD AUTO: 1.2 % (ref 0–1.5)
BILIRUB SERPL-MCNC: 0.3 MG/DL (ref 0–1.2)
BUN SERPL-MCNC: 10 MG/DL (ref 8–23)
BUN/CREAT SERPL: 11 (ref 7–25)
CALCIUM SPEC-SCNC: 9.8 MG/DL (ref 8.6–10.5)
CHLORIDE SERPL-SCNC: 101 MMOL/L (ref 98–107)
CHOLEST SERPL-MCNC: 281 MG/DL (ref 0–200)
CO2 SERPL-SCNC: 31.2 MMOL/L (ref 22–29)
CREAT SERPL-MCNC: 0.91 MG/DL (ref 0.57–1)
DEPRECATED RDW RBC AUTO: 41.5 FL (ref 37–54)
EGFRCR SERPLBLD CKD-EPI 2021: 69.7 ML/MIN/1.73
EOSINOPHIL # BLD AUTO: 0.23 10*3/MM3 (ref 0–0.4)
EOSINOPHIL NFR BLD AUTO: 4.6 % (ref 0.3–6.2)
ERYTHROCYTE [DISTWIDTH] IN BLOOD BY AUTOMATED COUNT: 12.7 % (ref 12.3–15.4)
GLOBULIN UR ELPH-MCNC: 2.5 GM/DL
GLUCOSE SERPL-MCNC: 89 MG/DL (ref 65–99)
HCT VFR BLD AUTO: 40.5 % (ref 34–46.6)
HCV AB SER QL: NORMAL
HDLC SERPL-MCNC: 51 MG/DL (ref 40–60)
HGB BLD-MCNC: 13.2 G/DL (ref 12–15.9)
IMM GRANULOCYTES # BLD AUTO: 0 10*3/MM3 (ref 0–0.05)
IMM GRANULOCYTES NFR BLD AUTO: 0 % (ref 0–0.5)
IRON 24H UR-MRATE: 115 MCG/DL (ref 37–145)
IRON SATN MFR SERPL: 23 % (ref 20–50)
LDLC SERPL CALC-MCNC: 195 MG/DL (ref 0–100)
LDLC/HDLC SERPL: 3.79 {RATIO}
LYMPHOCYTES # BLD AUTO: 1.68 10*3/MM3 (ref 0.7–3.1)
LYMPHOCYTES NFR BLD AUTO: 33.9 % (ref 19.6–45.3)
MCH RBC QN AUTO: 28.8 PG (ref 26.6–33)
MCHC RBC AUTO-ENTMCNC: 32.6 G/DL (ref 31.5–35.7)
MCV RBC AUTO: 88.2 FL (ref 79–97)
MONOCYTES # BLD AUTO: 0.38 10*3/MM3 (ref 0.1–0.9)
MONOCYTES NFR BLD AUTO: 7.7 % (ref 5–12)
NEUTROPHILS NFR BLD AUTO: 2.6 10*3/MM3 (ref 1.7–7)
NEUTROPHILS NFR BLD AUTO: 52.6 % (ref 42.7–76)
PLATELET # BLD AUTO: 349 10*3/MM3 (ref 140–450)
PMV BLD AUTO: 9.6 FL (ref 6–12)
POTASSIUM SERPL-SCNC: 4.5 MMOL/L (ref 3.5–5.2)
PROT SERPL-MCNC: 6.7 G/DL (ref 6–8.5)
RBC # BLD AUTO: 4.59 10*6/MM3 (ref 3.77–5.28)
SODIUM SERPL-SCNC: 138 MMOL/L (ref 136–145)
TIBC SERPL-MCNC: 496 MCG/DL (ref 298–536)
TRANSFERRIN SERPL-MCNC: 333 MG/DL (ref 200–360)
TRIGL SERPL-MCNC: 183 MG/DL (ref 0–150)
VLDLC SERPL-MCNC: 35 MG/DL (ref 5–40)
WBC NRBC COR # BLD AUTO: 4.95 10*3/MM3 (ref 3.4–10.8)

## 2024-10-29 PROCEDURE — 85025 COMPLETE CBC W/AUTO DIFF WBC: CPT

## 2024-10-29 PROCEDURE — 82306 VITAMIN D 25 HYDROXY: CPT

## 2024-10-29 PROCEDURE — 80061 LIPID PANEL: CPT

## 2024-10-29 PROCEDURE — 84466 ASSAY OF TRANSFERRIN: CPT

## 2024-10-29 PROCEDURE — 83540 ASSAY OF IRON: CPT

## 2024-10-29 PROCEDURE — 80053 COMPREHEN METABOLIC PANEL: CPT

## 2024-10-29 PROCEDURE — 86803 HEPATITIS C AB TEST: CPT

## 2024-10-29 PROCEDURE — 36415 COLL VENOUS BLD VENIPUNCTURE: CPT

## 2024-11-04 NOTE — PROGRESS NOTES
Cholesterol is notably higher.  Please let me know if you would like to start a prescription treatment for lowering cholesterol levels.  Alkaline phosphatase is improved.  Blood sugar, kidney function, and other liver enzymes are normal.  Vitamin D is currently normal.  You have never had hepatitis C and iron levels are normal.

## 2024-11-05 ENCOUNTER — OFFICE VISIT (OUTPATIENT)
Dept: FAMILY MEDICINE CLINIC | Age: 64
End: 2024-11-05
Payer: MEDICARE

## 2024-11-05 VITALS
WEIGHT: 158.6 LBS | BODY MASS INDEX: 29.19 KG/M2 | TEMPERATURE: 98.4 F | HEART RATE: 78 BPM | DIASTOLIC BLOOD PRESSURE: 81 MMHG | HEIGHT: 62 IN | SYSTOLIC BLOOD PRESSURE: 134 MMHG

## 2024-11-05 DIAGNOSIS — M25.50 ARTHRALGIA, UNSPECIFIED JOINT: ICD-10-CM

## 2024-11-05 DIAGNOSIS — Z00.00 MEDICARE ANNUAL WELLNESS VISIT, INITIAL: Primary | ICD-10-CM

## 2024-11-05 DIAGNOSIS — E78.2 MIXED HYPERLIPIDEMIA: ICD-10-CM

## 2024-11-05 PROBLEM — U07.1 COVID-19 VIRUS INFECTION: Status: RESOLVED | Noted: 2024-08-15 | Resolved: 2024-11-05

## 2024-11-05 RX ORDER — PRAVASTATIN SODIUM 10 MG
10 TABLET ORAL NIGHTLY
Qty: 90 TABLET | Refills: 1 | Status: SHIPPED | OUTPATIENT
Start: 2024-11-05

## 2024-11-05 NOTE — PROGRESS NOTES
Subjective   The ABCs of the Annual Wellness Visit  Medicare Wellness Visit      Katia Spears is a 64 y.o. patient who presents for a Medicare Wellness Visit.    The following portions of the patient's history were reviewed and   updated as appropriate: allergies, current medications, past family history, past medical history, past social history, past surgical history, and problem list.    Patient states she perceives health is worse this year compared to last year mainly due to joint pain of bilateral ankles, knees, and hands.  Voltaren gel is somewhat beneficial.  She has a history of degenerative disc disease and no recent imaging of the lumbar sacral spine.  She has had no fall or injury.  She has had a rheumatoid workup previously and saw a rheumatologist.  Defers renewed rheumatoid workup at this time.  We discussed options including physical therapy.  Patient will consider.  She will only be available for appointments in January and July as she will be staying in [place].    Gynecology, [facility], monitors breast cancer screening and osteoporosis screening.    Had an eye exam earlier this year and she has a questionable cataract in her right eye.  She is due a dental exam and will schedule.    Patient had stopped simvastatin due to concerns of potential to exacerbate muscle pain.  She is never taken any other statin medication.  Her recent labs show that cholesterol is higher so he does feel as if she needs to take something for high cholesterol.  She has a strong family history of high cholesterol.    Colonoscopy in 2022 and next is to be done next year.  She had 6 polyps removed with last colonoscopy.    Patient could be candidate to receive flu, Prevnar 20, shingles, or COVID-vaccine.  Patient to let me know if she wishes to pursue vaccinations.  Compared to one year ago, the patient's physical   health is worse.  Compared to one year ago, the patient's mental   health is the same.    Recent  Hospitalizations:  She was not admitted to the hospital during the last year.     Current Medical Providers:  Patient Care Team:  Esha Galeana APRN as PCP - General (Nurse Practitioner)  Caren Nagel MD as Consulting Physician (Obstetrics and Gynecology)  Evangelina Carlin APRN as Nurse Practitioner (Nurse Practitioner)  Nani Bailey MD as Consulting Physician (Pulmonary Disease)  Kip Beltran MD as Consulting Physician (Sleep Medicine)  Saad Gorman MD as Surgeon (Orthopedic Surgery)    Outpatient Medications Prior to Visit   Medication Sig Dispense Refill    baclofen (LIORESAL) 10 MG tablet Take 1 tablet by mouth 3 (Three) Times a Day. 270 tablet 1    cetirizine (zyrTEC) 10 MG tablet Take 1 tablet by mouth Every Night.      clonazePAM (KlonoPIN) 1 MG tablet Take 2 tablets by mouth Every Night. 180 tablet 1    DULoxetine (CYMBALTA) 60 MG capsule Take 1 capsule by mouth Every Night. 90 capsule 1    gabapentin (NEURONTIN) 300 MG capsule Take 1 capsule by mouth 3 (Three) Times a Day As Needed (fibromyalgia). 270 capsule 1    ibuprofen (ADVIL,MOTRIN) 800 MG tablet Take 1 tablet by mouth.      linaclotide (Linzess) 145 MCG capsule capsule TAKE 1 CAPSULE EVERY MORNING BEFORE BREAKFAST 90 capsule 1    lisinopril-hydrochlorothiazide (PRINZIDE,ZESTORETIC) 10-12.5 MG per tablet Take 1 tablet by mouth Every Morning. 90 tablet 1    pantoprazole (PROTONIX) 40 MG EC tablet TAKE 1 TABLET BY MOUTH DAILY 30 tablet 2    montelukast (SINGULAIR) 10 MG tablet Take 1 tablet by mouth Every Night. (Patient not taking: Reported on 8/15/2024) 90 tablet 1     No facility-administered medications prior to visit.     No opioid medication identified on active medication list. I have reviewed chart for other potential  high risk medication/s and harmful drug interactions in the elderly.      Aspirin is not on active medication list.  Aspirin use is not indicated based on review of current medical condition/s. Risk of  "harm outweighs potential benefits.  .    Patient Active Problem List   Diagnosis    Anemia    Anxiety    COPD (chronic obstructive pulmonary disease)    Deviated nasal septum    Encounter for long-term (current) use of other medications    Fibromyalgia    HTN (hypertension)    JE on CPAP    Personal history of tobacco use, presenting hazards to health    Generalized abdominal pain    Neck pain    Tachycardia    Constipation    Vitamin D deficiency    Mixed hyperlipidemia    Asthma    GERD (gastroesophageal reflux disease)    Nontraumatic complete tear of right rotator cuff    History of anemia    Other fatigue    Environmental allergies    Class 1 obesity    Osteoarthritis of glenohumeral joint, right    PLMD (periodic limb movement disorder)    Rotator cuff tear arthropathy of right shoulder    S/P reverse total shoulder arthroplasty, right    Right upper quadrant pain    Medicare annual wellness visit, initial     Advance Care Planning Advance Directive is not on file.  ACP discussion was held with the patient during this visit. Patient does not have an advance directive, declines further assistance.            Objective   Vitals:    11/05/24 0817   BP: 134/81   BP Location: Left arm   Patient Position: Sitting   Pulse: 78   Temp: 98.4 °F (36.9 °C)   TempSrc: Oral   Weight: 71.9 kg (158 lb 9.6 oz)   Height: 157.5 cm (62\")   PainSc:   6       Estimated body mass index is 29.01 kg/m² as calculated from the following:    Height as of this encounter: 157.5 cm (62\").    Weight as of this encounter: 71.9 kg (158 lb 9.6 oz).            Does the patient have evidence of cognitive impairment? No  Lab Results   Component Value Date    TRIG 183 (H) 10/29/2024    HDL 51 10/29/2024     (H) 10/29/2024    VLDL 35 10/29/2024                                                                                                Health  Risk Assessment    Smoking Status:  Social History     Tobacco Use   Smoking Status Former    " Current packs/day: 0.00    Average packs/day: 1 pack/day for 18.0 years (18.0 ttl pk-yrs)    Types: Cigarettes    Start date: 1978    Quit date: 1996    Years since quittin.8   Smokeless Tobacco Never     Alcohol Consumption:  Social History     Substance and Sexual Activity   Alcohol Use Yes    Comment: rare       Fall Risk Screen  STEADI Fall Risk Assessment was completed, and patient is at MODERATE risk for falls. Assessment completed on:2024    Depression Screenin/5/2024     8:21 AM   PHQ-2/PHQ-9 Depression Screening   Little interest or pleasure in doing things Not at all   Feeling down, depressed, or hopeless Not at all   How difficult have these problems made it for you to do your work, take care of things at home, or get along with other people? Not difficult at all     Health Habits and Functional and Cognitive Screenin/5/2024     8:21 AM   Functional & Cognitive Status   Do you have difficulty preparing food and eating? No   Do you have difficulty bathing yourself, getting dressed or grooming yourself? No   Do you have difficulty using the toilet? No   Do you have difficulty moving around from place to place? No   Do you have trouble with steps or getting out of a bed or a chair? No   Current Diet Well Balanced Diet   Dental Exam Up to date   Eye Exam Up to date   Exercise (times per week) 0 times per week   Current Exercises Include No Regular Exercise   Do you need help using the phone?  No   Are you deaf or do you have serious difficulty hearing?  No   Do you need help to go to places out of walking distance? No   Do you need help shopping? No   Do you need help preparing meals?  No   Do you need help with housework?  No   Do you need help with laundry? No   Do you need help taking your medications? No   Do you need help managing money? No   Do you ever drive or ride in a car without wearing a seat belt? No   Have you felt unusual stress, anger or loneliness in the  last month? No   Who do you live with? Spouse   If you need help, do you have trouble finding someone available to you? No   Have you been bothered in the last four weeks by sexual problems? No   Do you have difficulty concentrating, remembering or making decisions? No           Age-appropriate Screening Schedule:  Refer to the list below for future screening recommendations based on patient's age, sex and/or medical conditions. Orders for these recommended tests are listed in the plan section. The patient has been provided with a written plan.    Health Maintenance List  Health Maintenance   Topic Date Due    Pneumococcal Vaccine 65+ (1 of 2 - PCV) Never done    ZOSTER VACCINE (1 of 2) Never done    INFLUENZA VACCINE  08/01/2024    COVID-19 Vaccine (1 - 2024-25 season) Never done    BMI FOLLOWUP  01/05/2025    COLORECTAL CANCER SCREENING  08/11/2025    MAMMOGRAM  08/30/2025    DXA SCAN  08/30/2025    LIPID PANEL  10/29/2025    ANNUAL WELLNESS VISIT  11/05/2025    TDAP/TD VACCINES (2 - Td or Tdap) 12/15/2030    HEPATITIS C SCREENING  Completed                                                                                                                                                CMS Preventative Services Quick Reference  Risk Factors Identified During Encounter  Immunizations Discussed/Encouraged: Influenza, Prevnar 20 (Pneumococcal 20-valent conjugate), Shingrix, and COVID19  Dental Screening Recommended  Vision Screening Recommended    The above risks/problems have been discussed with the patient.  Pertinent information has been shared with the patient in the After Visit Summary.  An After Visit Summary and PPPS were made available to the patient.    Follow Up:   Next Medicare Wellness visit to be scheduled in 1 year.         Additional E&M Note during same encounter follows:  Patient has additional, significant, and separately identifiable condition(s)/problem(s) that require work above and beyond the  "Medicare Wellness Visit     Chief Complaint  Medicare Wellness-Initial Visit and Hypertension    Subjective   HPI  Katia is also being seen today for an annual adult preventative physical exam.                 Objective   Vital Signs:  /81 (BP Location: Left arm, Patient Position: Sitting)   Pulse 78   Temp 98.4 °F (36.9 °C) (Oral)   Ht 157.5 cm (62\")   Wt 71.9 kg (158 lb 9.6 oz)   BMI 29.01 kg/m²   Physical Exam  Vitals reviewed.   Constitutional:       General: She is not in acute distress.     Appearance: Normal appearance. She is well-developed.   Neck:      Thyroid: No thyromegaly.      Vascular: No carotid bruit.   Cardiovascular:      Rate and Rhythm: Normal rate and regular rhythm.      Pulses:           Posterior tibial pulses are 2+ on the right side and 2+ on the left side.      Heart sounds: Normal heart sounds.   Pulmonary:      Effort: Pulmonary effort is normal.      Breath sounds: Normal breath sounds.   Abdominal:      General: Bowel sounds are normal.      Palpations: There is no mass.      Tenderness: There is no abdominal tenderness.   Musculoskeletal:      Right lower leg: No edema.      Left lower leg: No edema.   Skin:     General: Skin is warm and dry.   Neurological:      General: No focal deficit present.      Mental Status: She is alert.   Psychiatric:         Attention and Perception: Attention normal.         Mood and Affect: Mood and affect normal.         Behavior: Behavior normal.         The following data was reviewed by: MIKE White on 11/05/2024:        Assessment and Plan Additional age appropriate preventative wellness advice topics were discussed during today's preventative wellness exam(some topics already addressed during AWV portion of the note above):   Nutrition: Discussed nutrition plan with patient. Information shared in after visit summary. Goal is for a well balanced diet to enhance overall health.              Medicare annual wellness visit, " initial  Preventive care measures are discussed.  Mixed hyperlipidemia     Arthralgia, unspecified joint      Orders Placed This Encounter   Procedures    SHIRIN Direct Reflex to [employer]     Standing Status:   Future     Standing Expiration Date:   11/5/2025     Order Specific Question:   Release to patient     Answer:   Routine Release [[id number]]    Cyclic citrul peptide antibody, IgG/IgA     Standing Status:   Future     Standing Expiration Date:   11/5/2025     Order Specific Question:   Release to patient     Answer:   Routine Release [[id number]]    CK     Standing Status:   Future     Standing Expiration Date:   11/5/2025     Order Specific Question:   Release to patient     Answer:   Routine Release [[id number]]    C-reactive protein     Standing Status:   Future     Standing Expiration Date:   11/5/2025     Order Specific Question:   Release to patient     Answer:   Routine Release [[id number]]    Rheumatoid Factor     Standing Status:   Future     Standing Expiration Date:   11/5/2025     Order Specific Question:   Release to patient     Answer:   Routine Release [[id number]]    Sedimentation rate     Standing Status:   Future     Standing Expiration Date:   11/5/2025     Order Specific Question:   Release to patient     Answer:   Routine Release [[id number]]    Uric acid     Standing Status:   Future     Standing Expiration Date:   11/5/2025     Order Specific Question:   Release to patient     Answer:   Routine Release [[id number]]    Lipid panel     Standing Status:   Future     Standing Expiration Date:   11/5/2025     Order Specific Question:   Release to patient     Answer:   Routine Release [[id number]]    Comprehensive metabolic panel     Standing Status:   Future     Standing Expiration Date:   11/5/2025     Order Specific Question:   Release to patient     Answer:   Routine Release [[id number]]     New Medications Ordered This Visit   Medications    pravastatin (PRAVACHOL) 10 MG tablet      Sig: Take 1 tablet by mouth Every Night.     Dispense:  90 tablet     Refill:  1        I spent 60 minutes caring for Katia on this date of service. This time includes time spent by me in the following activities:preparing for the visit, reviewing tests, obtaining and/or reviewing a separately obtained history, performing a medically appropriate examination and/or evaluation , counseling and educating the patient/family/caregiver, ordering medications, tests, or procedures, and documenting information in the medical record  Follow Up   No follow-ups on file.  Patient was given instructions and counseling regarding her condition or for health maintenance advice. Please see specific information pulled into the AVS if appropriate.

## 2025-01-04 DIAGNOSIS — M79.7 FIBROMYALGIA: ICD-10-CM

## 2025-01-04 DIAGNOSIS — K21.9 GASTROESOPHAGEAL REFLUX DISEASE WITHOUT ESOPHAGITIS: ICD-10-CM

## 2025-01-04 DIAGNOSIS — R10.11 RIGHT UPPER QUADRANT PAIN: ICD-10-CM

## 2025-01-04 DIAGNOSIS — F41.9 ANXIETY: ICD-10-CM

## 2025-01-07 ENCOUNTER — TELEPHONE (OUTPATIENT)
Dept: GASTROENTEROLOGY | Facility: CLINIC | Age: 65
End: 2025-01-07
Payer: OTHER GOVERNMENT

## 2025-01-07 NOTE — TELEPHONE ENCOUNTER
Contacted patient about appointment that was scheduled 01/06/2025 that was cancelled due to the weather. Was able to speak with the patient and rescheduled for 01/09/2025 at 10:45 am. Will make MA aware of this reschedule.

## 2025-01-08 ENCOUNTER — OFFICE VISIT (OUTPATIENT)
Dept: SLEEP MEDICINE | Facility: HOSPITAL | Age: 65
End: 2025-01-08
Payer: MEDICARE

## 2025-01-08 VITALS
SYSTOLIC BLOOD PRESSURE: 135 MMHG | BODY MASS INDEX: 28.52 KG/M2 | HEART RATE: 71 BPM | OXYGEN SATURATION: 100 % | HEIGHT: 62 IN | DIASTOLIC BLOOD PRESSURE: 77 MMHG | WEIGHT: 155 LBS

## 2025-01-08 DIAGNOSIS — E66.811 CLASS 1 OBESITY: ICD-10-CM

## 2025-01-08 DIAGNOSIS — G47.61 PLMD (PERIODIC LIMB MOVEMENT DISORDER): ICD-10-CM

## 2025-01-08 DIAGNOSIS — G47.33 OSA ON CPAP: Primary | ICD-10-CM

## 2025-01-08 PROCEDURE — 3078F DIAST BP <80 MM HG: CPT | Performed by: INTERNAL MEDICINE

## 2025-01-08 PROCEDURE — 99214 OFFICE O/P EST MOD 30 MIN: CPT | Performed by: INTERNAL MEDICINE

## 2025-01-08 PROCEDURE — 1159F MED LIST DOCD IN RCRD: CPT | Performed by: INTERNAL MEDICINE

## 2025-01-08 PROCEDURE — 1160F RVW MEDS BY RX/DR IN RCRD: CPT | Performed by: INTERNAL MEDICINE

## 2025-01-08 PROCEDURE — G0463 HOSPITAL OUTPT CLINIC VISIT: HCPCS

## 2025-01-08 PROCEDURE — 3075F SYST BP GE 130 - 139MM HG: CPT | Performed by: INTERNAL MEDICINE

## 2025-01-08 RX ORDER — DULOXETIN HYDROCHLORIDE 60 MG/1
60 CAPSULE, DELAYED RELEASE ORAL NIGHTLY
Qty: 90 CAPSULE | Refills: 1 | Status: SHIPPED | OUTPATIENT
Start: 2025-01-08

## 2025-01-08 RX ORDER — CLONAZEPAM 1 MG/1
2 TABLET ORAL NIGHTLY
Qty: 180 TABLET | Refills: 1 | Status: SHIPPED | OUTPATIENT
Start: 2025-01-08

## 2025-01-08 RX ORDER — PANTOPRAZOLE SODIUM 40 MG/1
40 TABLET, DELAYED RELEASE ORAL DAILY
Qty: 30 TABLET | Refills: 5 | Status: SHIPPED | OUTPATIENT
Start: 2025-01-08

## 2025-01-08 NOTE — PROGRESS NOTES
"  [facility]  Sleep medicine  [address] [place] [address]  [place]   [place] [address]  Phone: [phone]  Fax: [phone]      SLEEP CLINIC FOLLOW UP PROGRESS NOTE.    Katia Spears  [id number]   1960  64 y.o.  female      PCP: Esha Galeana APRN      Date of visit: 1/8/2025    Chief Complaint   Patient presents with    Sleep Apnea    Leg/body Jerks During Sleep       HPI:  This is a 64 y.o. years old patient is here for the management of obstructive sleep apnea.  Sleep apnea is mild in severity with a AHI of 9/hr.Patient is using positive airway pressure therapy and the symptoms of sleep apnea have improved significantly on auto CPAP. Normally patient goes to bed at 1130 PM and wakes up at 8 AM .  The patient wakes up 3 time(s) during the night and has no problem going back to sleep.       Patient also has severe PLMD.  She had a polysomnography which showed PLMS index of 228/h with arousals of 142/h related to PLMS.  She also goes to [place] for few months.  It is becoming difficult for her to get them medications and prescriptions transferred because of its benzo.    Medications and allergies are reviewed by me and documented in the encounter.     SOCIAL ( habits pertaining to sleep medicine)  History of tobacco use:No   History of alcohol use: 1 per week  Caffeine use: 2    REVIEW OF SYSTEMS:   Pertaining positive symptoms:  [place] Sleepiness Scale :Total score: 12   Dry mouth      PHYSICAL EXAMINATION:  CONSTITUTIONAL:  Vitals:    01/08/25 0900   BP: 135/77   BP Location: Left arm   Patient Position: Sitting   Pulse: 71   SpO2: 100%   Weight: 70.3 kg (155 lb)   Height: 157.5 cm (62.01\")    Body mass index is 28.34 kg/m².   NOSE: nasal passages are clear, No deformities noted   RESP SYSTEM: Not in any respiratory distress, no chest deformities noted,   CARDIOVASULAR: No edema noted  NEURO: Oriented x 3, gait normal,  Mood and affect appeared appropriate      Data reviewed:  The Smart " card downloaded on 1/8/2025 has been reviewed independently by me for compliance and discussed the data with the patient.   Compliance;70%  More than 4 hr use, 70%  Average use of the device 5 hours  night  Residual AHI: 2.5 /hr (goal < 5.0 /hr)  Mask type: Fullface mask  Device: ResMed  DME: [employer]  Needs a new mask      Ian checked, 1/8/2025      ASSESSMENT AND PLAN:  Obstructive sleep apnea ( G 47.33).  The symptoms of sleep apnea have improved with the device and the treatment.  Patient's compliance with the device is excellent for treatment of sleep apnea.  I have independently reviewed the smart card down load and discussed with the patient the download data and encouarged the patient to continue to use the device.The residual AHI is acceptable. The device is benefiting the patient and the device is medically necessary.  Without proper control of sleep apnea and good compliance there is a increased risk for hypertension, diabetes mellitus and nonrestorative sleep with hypersomnia which can increase risk for motor vehicle accidents.  Untreated sleep apnea is also a risk factor for development of atrial fibrillation, pulmonary hypertension, insulin resistance and stroke. The patient is also instructed to get the supplies from the DME company and and change them on a regular basis.  A prescription for supplies has been sent to the DME company.  I have also discussed the good sleep hygiene habits and adequate amount of sleep needed for good health.  Severe PLMD, she is on clonazepam 2 mg, she says it has somewhat helped her but still feels restless.  I am going to increase her clonazepam to 2 mg at bedtime sent a prescription for 60 tablets with 5 refills.  I will see her in about 6 months for follow-up.  During her visit in the fall I will try to give a prescription for 3-month supplies so that she can fill these prescriptions before she leaves to [facility].  She has lost some weight since her  last visit which is very encouraging  Return in about 6 months (around 7/8/2025) for with smart card down load. . Patient's questions were answered.      Kip Beltran MD  Sleep Medicine  Medical Director,   [facility]  1/8/2025

## 2025-01-09 ENCOUNTER — OFFICE VISIT (OUTPATIENT)
Dept: GASTROENTEROLOGY | Facility: CLINIC | Age: 65
End: 2025-01-09
Payer: MEDICARE

## 2025-01-09 VITALS
DIASTOLIC BLOOD PRESSURE: 85 MMHG | WEIGHT: 159.6 LBS | HEART RATE: 79 BPM | SYSTOLIC BLOOD PRESSURE: 119 MMHG | HEIGHT: 62 IN | BODY MASS INDEX: 29.37 KG/M2

## 2025-01-09 DIAGNOSIS — R74.8 ELEVATED LIVER ENZYMES: ICD-10-CM

## 2025-01-09 DIAGNOSIS — K76.0 FATTY LIVER: Primary | ICD-10-CM

## 2025-01-09 DIAGNOSIS — K59.04 CHRONIC IDIOPATHIC CONSTIPATION: ICD-10-CM

## 2025-01-09 DIAGNOSIS — R94.5 ABNORMAL RESULTS OF LIVER FUNCTION STUDIES: ICD-10-CM

## 2025-01-09 PROCEDURE — 1159F MED LIST DOCD IN RCRD: CPT | Performed by: NURSE PRACTITIONER

## 2025-01-09 PROCEDURE — 99214 OFFICE O/P EST MOD 30 MIN: CPT | Performed by: NURSE PRACTITIONER

## 2025-01-09 PROCEDURE — 1160F RVW MEDS BY RX/DR IN RCRD: CPT | Performed by: NURSE PRACTITIONER

## 2025-01-09 PROCEDURE — 3074F SYST BP LT 130 MM HG: CPT | Performed by: NURSE PRACTITIONER

## 2025-01-09 PROCEDURE — 3079F DIAST BP 80-89 MM HG: CPT | Performed by: NURSE PRACTITIONER

## 2025-01-09 NOTE — PROGRESS NOTES
Chief Complaint   Abdominal Pain, Vomiting, and Bloated    History of Present Illness       Katia Spears is a 64 y.o. female who presents to [facility] for follow-up abdominal pain.  She was last seen in the office by nurse practitioner Bebeto Carlin on 4/22.  She is new to me today.    She admits she has been having worsening abd pain for the past year. She had workup with her PCP that included CT scan, abdominal ultrasound and x-ray.  Nothing acute found on any of those.  She believes it could be her constipation.  She has been taking Linzess 145 mcg daily and does not feel like it is working very well.    She underwent EGD and colonoscopy Dr. Garcia on 8/11/2022.  EGD showed a small hiatal hernia.  Normal esophagus and stomach.  Colonoscopy showed to 2 to 3 mm polyps in the ascending colon which were removed.  Four 5 to 10 mm polyps removed in the ascending colon.  Mild diverticulosis.  Nonbleeding internal hemorrhoids.  Repeat colonoscopy in 3 years.    She also has hx fatty liver-seen on abd US in 2023. Most recent CMP showed elevated alk phos at 126. She drinks ETOH on occasion.  Never had liver serology done.    GERD/HH---Protonix 40 mg daily.  Admits it works well.  If she misses a dose she has terrible symptoms.  Denies any dysphagia.  Good appetite.    GI FH----denies any   Results       Result Review :       CMP          6/11/2024    11:31 10/29/2024    10:04   CMP   Glucose 80  89    BUN 12  10    Creatinine 0.83  0.91    EGFR 78.3  69.7    Sodium 136  138    Potassium 3.9  4.5    Chloride 97  101    Calcium 9.7  9.8    Total Protein 6.9  6.7    Albumin 4.1  4.2    Globulin 2.8  2.5    Total Bilirubin 0.2  0.3    Alkaline Phosphatase 140  126    AST (SGOT) 14  20    ALT (SGPT) 15  15    Albumin/Globulin Ratio 1.5  1.7    BUN/Creatinine Ratio 14.5  11.0    Anion Gap 9.4  5.8      CBC          6/11/2024    11:31 10/29/2024    10:04   CBC   WBC 4.44  4.95    RBC 4.31   "4.59    Hemoglobin 12.4  13.2    Hematocrit 38.8  40.5    MCV 90.0  88.2    MCH 28.8  28.8    MCHC 32.0  32.6    RDW 14.1  12.7    Platelets 429  349      CBC w/diff          6/11/2024    11:31 10/29/2024    10:04   CBC w/Diff   WBC 4.44  4.95    RBC 4.31  4.59    Hemoglobin 12.4  13.2    Hematocrit 38.8  40.5    MCV 90.0  88.2    MCH 28.8  28.8    MCHC 32.0  32.6    RDW 14.1  12.7    Platelets 429  349    Neutrophil Rel % 53.6  52.6    Immature Granulocyte Rel % 0.2  0.0    Lymphocyte Rel % 33.6  33.9    Monocyte Rel % 9.0  7.7    Eosinophil Rel % 2.9  4.6    Basophil Rel % 0.7  1.2      Lipid Panel          10/29/2024    10:04   Lipid Panel   Total Cholesterol 281    Triglycerides 183    HDL Cholesterol 51    VLDL Cholesterol 35    LDL Cholesterol  195    LDL/HDL Ratio 3.79          Lipase   Lipase   Date Value Ref Range Status   06/11/2024 44 13 - 60 U/L Final     Amylase   Amylase   Date Value Ref Range Status   06/11/2024 63 28 - 100 U/L Final     Iron Profile   Iron   Date Value Ref Range Status   10/29/2024 115 37 - 145 mcg/dL Final     TIBC   Date Value Ref Range Status   10/29/2024 496 298 - 536 mcg/dL Final     Iron Saturation (TSAT)   Date Value Ref Range Status   10/29/2024 23 20 - 50 % Final     Transferrin   Date Value Ref Range Status   10/29/2024 333 200 - 360 mg/dL Final     Ferritin   Ferritin   Date Value Ref Range Status   03/20/2019 63 10 - 200 ng/mL Final     Comment:     <10 ng/ml usually associated with Iron Deficiency Anemia.  Above normal range levels may be due to Hepatic and/or  Chronic Inflammatory Disease.       ESR (Sed Rate)   Sed Rate   Date Value Ref Range Status   06/12/2023 3 0 - 30 mm/hr Final     CRP (C-Reactive) No results found for: \"CRP\"  Liver Workup   Ferritin   Date Value Ref Range Status   03/20/2019 63 10 - 200 ng/mL Final     Comment:     <10 ng/ml usually associated with Iron Deficiency Anemia.  Above normal range levels may be due to Hepatic and/or  Chronic " Inflammatory Disease.       Iron   Date Value Ref Range Status   10/29/2024 115 37 - 145 mcg/dL Final     TIBC   Date Value Ref Range Status   10/29/2024 496 298 - 536 mcg/dL Final     Iron Saturation (TSAT)   Date Value Ref Range Status   10/29/2024 23 20 - 50 % Final     Transferrin   Date Value Ref Range Status   10/29/2024 333 200 - 360 mg/dL Final     Protime   Date Value Ref Range Status   12/19/2022 13.6 (H) 9.4 - 12.2 Second Final     Comment:     Patients taking the antibiotic Cubicin (daptomycin) may have falsely prolonged PT/INR results.     INR   Date Value Ref Range Status   12/19/2022 1.21 INR Final     Comment:     Recommended INR range for Oral Anticoagulant Therapy :     STANDARD: 2.0 - 3.0   HIGH: 3.0 - 4.5    NOTE: Recommended range will be different for patients with mechanical         implants.               Past Medical History       Past Medical History:   Diagnosis Date    Anemia, unspecified     Anxiety     Arthritis     Arthritis of back 2022    Asthma     Cholelithiasis     Chronic obstructive pulmonary disease, unspecified     Chronic sinusitis, unspecified     Colon polyp 2022    Constipation     Deviated nasal septum     Dysthymic disorder     Essential (primary) hypertension     Fatty liver     Fibromyalgia     Fibromyalgia, primary     Fracture of wrist 2018    Frozen shoulder     Generalized anxiety disorder     GERD (gastroesophageal reflux disease)     Hypo-osmolality and hyponatremia     Knee swelling     Mixed hyperlipidemia     Obstructive sleep apnea (adult) (pediatric)     cpap    Osteopenia     Osteoporosis     Other difficulties with micturition     Other long term (current) drug therapy     Periarthritis of shoulder 2020    PLMD (periodic limb movement disorder)     Pneumonia 12/2022    PONV (postoperative nausea and vomiting)     RLS (restless legs syndrome)     Rotator cuff syndrome 2020    Scoliosis 2000    Tear of meniscus of knee 2010    Urinary incontinence in female      Vitamin D deficiency, unspecified        Past Surgical History:   Procedure Laterality Date     SECTION      X2    CHOLECYSTECTOMY      COLONOSCOPY      COLONOSCOPY N/A 2022    Procedure: COLONOSCOPY WITH BIOPSIES, POLYPECTOMIES;  Surgeon: Esha Garcia MD;  [facility];  Service: Gastroenterology;  Laterality: N/A;  COLON POLYPS    DILATATION AND CURETTAGE      MULTIPLE    ENDOMETRIAL ABLATION      ENDOSCOPY N/A 2022    Procedure: ESOPHAGOGASTRODUODENOSCOPY WITH BIOPSIES;  Surgeon: Esha Garcia MD;  Location: [facility];  Service: Gastroenterology;  Laterality: N/A;  HIATAL HERNIA    FRACTURE SURGERY      JOINT REPLACEMENT  10/2023    Reverse Shoulder Replacement    KNEE SURGERY Right     torn meniscus    LEEP      OVARY SURGERY Right     TOTAL SHOULDER ARTHROPLASTY W/ DISTAL CLAVICLE EXCISION Right 10/27/2023    Procedure: Right reverse total shoulder arthroplasty;  Surgeon: Saad Gorman MD;  [place]  [facility] [place] [facility] [place]  Service: [place]opedics;  Laterality: Right;    TRIGGER POINT INJECTION      UPPER GASTROINTESTINAL ENDOSCOPY  2022    WRIST FRACTURE SURGERY Right 2019    hardware         Current Outpatient Medications:     baclofen (LIORESAL) 10 MG tablet, Take 1 tablet by mouth 3 (Three) Times a Day., Disp: 270 tablet, Rfl: 1    clonazePAM (KlonoPIN) 1 MG tablet, Take 2 tablets by mouth Every Night., Disp: 180 tablet, Rfl: 1    DULoxetine (CYMBALTA) 60 MG capsule, TAKE 1 CAPSULE BY MOUTH EVERY NIGHT, Disp: 90 capsule, Rfl: 1    gabapentin (NEURONTIN) 300 MG capsule, Take 1 capsule by mouth 3 (Three) Times a Day As Needed (fibromyalgia)., Disp: 270 capsule, Rfl: 1    ibuprofen (ADVIL,MOTRIN) 800 MG tablet, Take 1 tablet by mouth., Disp: , Rfl:     linaclotide (Linzess) 145 MCG capsule capsule, TAKE 1 CAPSULE EVERY MORNING BEFORE BREAKFAST, Disp: 90 capsule, Rfl: 1    lisinopril-hydrochlorothiazide (PRINZIDE,ZESTORETIC) 10-12.5 MG per  tablet, Take 1 tablet by mouth Every Morning., Disp: 90 tablet, Rfl: 1    pantoprazole (PROTONIX) 40 MG EC tablet, TAKE 1 TABLET BY MOUTH DAILY, Disp: 30 tablet, Rfl: 5    pravastatin (PRAVACHOL) 10 MG tablet, Take 1 tablet by mouth Every Night., Disp: 90 tablet, Rfl: 1    linaclotide (LINZESS) 290 MCG capsule capsule, Take 1 capsule by mouth Every Morning Before Breakfast., Disp: 12 capsule, Rfl: 0     Allergies   Allergen Reactions    Wound Dressing Adhesive Other (See Comments)     Blisters        Family History   Problem Relation Age of Onset    Lung cancer Father     Cancer Father         Lung    Hyperlipidemia Sister     Diabetes type II Sister     Thyroid disease Sister     Rheum arthritis Sister     Sleep apnea Sister     Insomnia Sister     Diabetes Sister         Type 2    Cancer Sister         Breast    Rheumatologic disease Sister     Hyperlipidemia Brother     Schizophrenia Brother     Sleep walking Brother     Cancer Maternal Aunt         Breast    Cancer Maternal Aunt         Breast    Cancer Paternal Aunt         Breast    Colon cancer Paternal Grandmother     Diabetes type I Son     Coronary artery disease Other     Malig Hyperthermia Neg Hx         Social History     Social History Narrative    Not on file       Objective       Review of Systems   Constitutional:  Negative for appetite change, fatigue, fever, unexpected weight gain and unexpected weight loss.   HENT:  Negative for trouble swallowing.    Respiratory:  Negative for cough, choking, chest tightness, shortness of breath, wheezing and stridor.    Cardiovascular:  Negative for chest pain, palpitations and leg swelling.   Gastrointestinal:  Positive for abdominal distention, abdominal pain and constipation. Negative for anal bleeding, blood in stool, diarrhea, nausea, rectal pain, vomiting, GERD and indigestion.        Vital Signs:   /85 (BP Location: Left arm, Patient Position: Sitting, Cuff Size: Adult)   Pulse 79   Ht 157.5 cm  "(62.01\")   Wt 72.4 kg (159 lb 9.6 oz)   BMI 29.18 kg/m²       Physical Exam  Constitutional:       General: She is not in acute distress.     Appearance: She is well-developed. She is not ill-appearing.   HENT:      Head: Normocephalic.   Eyes:      Pupils: Pupils are equal, round, and reactive to light.   Cardiovascular:      Rate and Rhythm: Normal rate and regular rhythm.      Heart sounds: Normal heart sounds.   Pulmonary:      Effort: Pulmonary effort is normal.      Breath sounds: Normal breath sounds.   Abdominal:      General: Bowel sounds are normal. There is distension.      Palpations: Abdomen is soft. There is no mass.      Tenderness: There is no abdominal tenderness. There is no guarding or rebound.      Hernia: No hernia is present.   Musculoskeletal:         General: Normal range of motion.   Skin:     General: Skin is warm and dry.   Neurological:      Mental Status: She is alert and oriented to person, place, and time.   Psychiatric:         Speech: Speech normal.         Behavior: Behavior normal.         Judgment: Judgment normal.           Assessment & Plan          Assessment and Plan    Diagnoses and all orders for this visit:    1. Fatty liver (Primary)  -     Protime-INR; Future  -     Hepatitis Panel, Acute  -     SHIRIN  -     Alpha - 1 - Antitrypsin  -     Anti-Smooth Muscle Antibody Titer  -     Ceruloplasmin  -     Immunofixation, Serum; Future  -     Iron Profile; Future  -     Mitochondrial Antibodies, M2  -     US Liver; Future    2. Elevated liver enzymes  -     Protime-INR; Future  -     Hepatitis Panel, Acute  -     SHIRIN  -     Alpha - 1 - Antitrypsin  -     Anti-Smooth Muscle Antibody Titer  -     Ceruloplasmin  -     Immunofixation, Serum; Future  -     Iron Profile; Future  -     Mitochondrial Antibodies, M2  -     US Liver; Future    3. Abnormal results of liver function studies  -     Protime-INR; Future  -     Hepatitis Panel, Acute  -     SHIRIN  -     Alpha - 1 - Antitrypsin  - "     Anti-Smooth Muscle Antibody Titer  -     Ceruloplasmin  -     Immunofixation, Serum; Future  -     Iron Profile; Future  -     Mitochondrial Antibodies, M2  -     US Liver; Future    4. Chronic idiopathic constipation  -     linaclotide (LINZESS) 290 MCG capsule capsule; Take 1 capsule by mouth Every Morning Before Breakfast.  Dispense: 12 capsule; Refill: 0      Reviewed medical history with her today.  Constipation is clearly not well-controlled.  I think this could definitely be contributing to her abdominal symptoms.  Will increase Linzess to 290 daily.  Samples given today.  Given her newly diagnosed fatty liver disease on imaging we will check liver serology.  Will check liver ultrasound.  GERD is well-controlled most of the time on pantoprazole 40 mg.  Okay to add Pepcid AC at night as needed.  Continue GERD precautions.  Patient will be due for screening colonoscopy later this year.  We will plan to do an EGD with it at that time.  The patient will be back in July from her [place] home.  We will plan on scheduling scopes when we see her back in the office at that time.  Patient to give me an update in 2 weeks.  Patient to follow-up with me in 6 months.  Patient is agreeable to the plan.          Follow Up       Follow Up   Return in about 6 months (around 7/9/2025) for GERD, CONSTIPATION.  Patient was given instructions and counseling regarding her condition or for health maintenance advice. Please see specific information pulled into the AVS if appropriate.

## 2025-01-09 NOTE — PATIENT INSTRUCTIONS
Increase Linzess 145 to 290 (double dose)   Will give you samples of the 290     Give me update in 1-2 weeks (MYCHART me or call office)     Will plan on scopes this Fall or Winter when you are back in town     OK to add pepcid AC complete before dinner or bedtime to help with breakthrough reflux symptoms

## 2025-01-13 DIAGNOSIS — K59.04 CHRONIC IDIOPATHIC CONSTIPATION: ICD-10-CM

## 2025-01-15 ENCOUNTER — PATIENT ROUNDING (BHMG ONLY) (OUTPATIENT)
Dept: GASTROENTEROLOGY | Facility: CLINIC | Age: 65
End: 2025-01-15
Payer: OTHER GOVERNMENT

## 2025-01-15 NOTE — PROGRESS NOTES
1/15/2025      Hello, may I speak with Katia BOTELLO Tory     My name is Jackie. I am calling from [facility]. I show that you had a recent visit with MIKE Petersen.    Before we get started may I verify your date of birth? 1960    I am calling to officially welcome you to our practice and ask about your recent visit. Is this a good time to talk?  LVM ok per KILEY    Tell me about your visit with us. What things went well?    We strive to ensure that we protect your safety and privacy. Is there anything we could have done to improve this during your visit?        We're always looking for ways to make our patients' experiences even better. Do you have recommendations on ways we may improve?    Overall were you satisfied with your first visit to our practice?    I appreciate you taking the time to speak with me today. Is there anything else I can do for you?    I am glad to hear that you had a very good visit and I appreciate you taking the time to provide feedback on this call. We would greatly appreciate you filling out a survey if you receive one in the mail, email or text. This is a great opportunity to provide any additional feedback that you may think of after this call as well.       Thank you, and have a great day.

## 2025-01-16 ENCOUNTER — LAB (OUTPATIENT)
Dept: LAB | Facility: HOSPITAL | Age: 65
End: 2025-01-16
Payer: MEDICARE

## 2025-01-16 ENCOUNTER — HOSPITAL ENCOUNTER (OUTPATIENT)
Dept: ULTRASOUND IMAGING | Facility: HOSPITAL | Age: 65
Discharge: HOME | End: 2025-01-16
Payer: MEDICARE

## 2025-01-16 DIAGNOSIS — R94.5 ABNORMAL RESULTS OF LIVER FUNCTION STUDIES: ICD-10-CM

## 2025-01-16 DIAGNOSIS — K76.0 FATTY LIVER: ICD-10-CM

## 2025-01-16 DIAGNOSIS — R74.8 ELEVATED LIVER ENZYMES: ICD-10-CM

## 2025-01-16 LAB
ALPHA1 GLOB MFR UR ELPH: 153 MG/DL (ref 90–200)
CERULOPLASMIN SERPL-MCNC: 22 MG/DL (ref 19–39)
HAV IGM SERPL QL IA: NORMAL
HBV CORE IGM SERPL QL IA: NORMAL
HBV SURFACE AG SERPL QL IA: NORMAL
HCV AB SER QL: NORMAL
INR PPP: 0.97 (ref 0.86–1.15)
IRON 24H UR-MRATE: 135 MCG/DL (ref 37–145)
IRON SATN MFR SERPL: 28 % (ref 20–50)
PROTHROMBIN TIME: 13.1 SECONDS (ref 11.8–14.9)
TIBC SERPL-MCNC: 481 MCG/DL (ref 298–536)
TRANSFERRIN SERPL-MCNC: 323 MG/DL (ref 200–360)

## 2025-01-16 PROCEDURE — 76705 ECHO EXAM OF ABDOMEN: CPT

## 2025-01-16 PROCEDURE — 80074 ACUTE HEPATITIS PANEL: CPT | Performed by: NURSE PRACTITIONER

## 2025-01-16 PROCEDURE — 86334 IMMUNOFIX E-PHORESIS SERUM: CPT

## 2025-01-16 PROCEDURE — 84466 ASSAY OF TRANSFERRIN: CPT

## 2025-01-16 PROCEDURE — 86381 MITOCHONDRIAL ANTIBODY EACH: CPT | Performed by: NURSE PRACTITIONER

## 2025-01-16 PROCEDURE — 82390 ASSAY OF CERULOPLASMIN: CPT | Performed by: NURSE PRACTITIONER

## 2025-01-16 PROCEDURE — 86015 ACTIN ANTIBODY EACH: CPT | Performed by: NURSE PRACTITIONER

## 2025-01-16 PROCEDURE — 83540 ASSAY OF IRON: CPT

## 2025-01-16 PROCEDURE — 82103 ALPHA-1-ANTITRYPSIN TOTAL: CPT | Performed by: NURSE PRACTITIONER

## 2025-01-16 PROCEDURE — 85610 PROTHROMBIN TIME: CPT

## 2025-01-16 PROCEDURE — 86038 ANTINUCLEAR ANTIBODIES: CPT | Performed by: NURSE PRACTITIONER

## 2025-01-16 PROCEDURE — 36415 COLL VENOUS BLD VENIPUNCTURE: CPT

## 2025-01-16 PROCEDURE — 82784 ASSAY IGA/IGD/IGG/IGM EACH: CPT

## 2025-01-17 LAB
ANA SER QL: NEGATIVE
MITOCHONDRIA M2 IGG SER-ACNC: <20 UNITS (ref 0–20)
SMA IGG SER-ACNC: 6 UNITS (ref 0–19)

## 2025-01-20 LAB
IGA SERPL-MCNC: 109 MG/DL (ref 87–352)
IGG SERPL-MCNC: 816 MG/DL (ref 586–1602)
IGM SERPL-MCNC: 83 MG/DL (ref 26–217)
PROT PATTERN SERPL IFE-IMP: NORMAL

## 2025-01-21 ENCOUNTER — TELEPHONE (OUTPATIENT)
Dept: GASTROENTEROLOGY | Facility: CLINIC | Age: 65
End: 2025-01-21
Payer: OTHER GOVERNMENT

## 2025-01-21 NOTE — TELEPHONE ENCOUNTER
----- Message from Samara Mtz sent at 1/20/2025 12:43 PM EST -----  Liver ultrasound did show minimal hepatic steatosis.  Very little fat in the liver.  Otherwise normal.

## 2025-01-22 ENCOUNTER — TELEPHONE (OUTPATIENT)
Dept: GASTROENTEROLOGY | Facility: CLINIC | Age: 65
End: 2025-01-22
Payer: OTHER GOVERNMENT

## 2025-01-22 NOTE — TELEPHONE ENCOUNTER
Caller: Katia Spears    Relationship: Self    Best call back number: [phone]    What is the best time to reach you: ANYTIME    Who are you requesting to speak with (clinical staff, provider,  specific staff member): SPECIFIC STAFF MEMBER    Do you know the name of the person who called: HUGO    What was the call regarding: RESULTS    Is it okay if the provider responds through MyChart: YES

## 2025-01-23 ENCOUNTER — TELEPHONE (OUTPATIENT)
Dept: GASTROENTEROLOGY | Facility: CLINIC | Age: 65
End: 2025-01-23
Payer: OTHER GOVERNMENT

## 2025-01-23 NOTE — TELEPHONE ENCOUNTER
Hub staff attempted to follow warm transfer process and was unsuccessful     Caller: Katia Spears    Relationship to patient: Self    Best call back number: [phone]    Patient is needing: PATIENT TRIED TO RETURN OFFICE CALL AGAIN TODAY. SHE STATES IT IS OK TO LEAVE A DETAILED MESSAGE ON HER VOICEMAIL OR EVEN MESSAGE HER ON [employer]

## 2025-01-23 NOTE — TELEPHONE ENCOUNTER
Attempted to call patient for her results. Patient did not answer, left a message and sent a letter to patient. Closing this encounter out.

## 2025-03-17 DIAGNOSIS — I10 PRIMARY HYPERTENSION: ICD-10-CM

## 2025-03-17 RX ORDER — LISINOPRIL AND HYDROCHLOROTHIAZIDE 10; 12.5 MG/1; MG/1
1 TABLET ORAL EVERY MORNING
Qty: 90 TABLET | Refills: 1 | Status: SHIPPED | OUTPATIENT
Start: 2025-03-17

## 2025-05-06 DIAGNOSIS — E78.2 MIXED HYPERLIPIDEMIA: ICD-10-CM

## 2025-05-06 RX ORDER — PRAVASTATIN SODIUM 10 MG
10 TABLET ORAL NIGHTLY
Qty: 90 TABLET | Refills: 1 | Status: SHIPPED | OUTPATIENT
Start: 2025-05-06

## 2025-05-20 ENCOUNTER — TELEPHONE (OUTPATIENT)
Dept: GASTROENTEROLOGY | Facility: CLINIC | Age: 65
End: 2025-05-20
Payer: OTHER GOVERNMENT

## 2025-05-20 NOTE — TELEPHONE ENCOUNTER
Spoke with patient and advised that the appointment on 07/10/25 is being rescheduled to 07/30/25 @ 11 am due to provider being out of office. Patient is agreeable and verbalized understanding.

## 2025-07-03 ENCOUNTER — LAB (OUTPATIENT)
Dept: LAB | Facility: HOSPITAL | Age: 65
End: 2025-07-03
Payer: MEDICARE

## 2025-07-03 DIAGNOSIS — E78.2 MIXED HYPERLIPIDEMIA: ICD-10-CM

## 2025-07-03 DIAGNOSIS — M25.50 ARTHRALGIA, UNSPECIFIED JOINT: ICD-10-CM

## 2025-07-03 LAB
ALBUMIN SERPL-MCNC: 4.1 G/DL (ref 3.5–5.2)
ALBUMIN/GLOB SERPL: 1.7 G/DL
ALP SERPL-CCNC: 98 U/L (ref 39–117)
ALT SERPL W P-5'-P-CCNC: 16 U/L (ref 1–33)
ANION GAP SERPL CALCULATED.3IONS-SCNC: 9.3 MMOL/L (ref 5–15)
AST SERPL-CCNC: 18 U/L (ref 1–32)
BILIRUB SERPL-MCNC: <0.2 MG/DL (ref 0–1.2)
BUN SERPL-MCNC: 13 MG/DL (ref 8–23)
BUN/CREAT SERPL: 13.4 (ref 7–25)
CALCIUM SPEC-SCNC: 9.5 MG/DL (ref 8.6–10.5)
CHLORIDE SERPL-SCNC: 99 MMOL/L (ref 98–107)
CHOLEST SERPL-MCNC: 208 MG/DL (ref 0–200)
CHROMATIN AB SERPL-ACNC: <10 IU/ML (ref 0–14)
CK SERPL-CCNC: 53 U/L (ref 20–180)
CO2 SERPL-SCNC: 28.7 MMOL/L (ref 22–29)
CREAT SERPL-MCNC: 0.97 MG/DL (ref 0.57–1)
CRP SERPL-MCNC: <0.3 MG/DL (ref 0–0.5)
EGFRCR SERPLBLD CKD-EPI 2021: 64.6 ML/MIN/1.73
ERYTHROCYTE [SEDIMENTATION RATE] IN BLOOD: 8 MM/HR (ref 0–30)
GLOBULIN UR ELPH-MCNC: 2.4 GM/DL
GLUCOSE SERPL-MCNC: 81 MG/DL (ref 65–99)
HDLC SERPL-MCNC: 54 MG/DL (ref 40–60)
LDLC SERPL CALC-MCNC: 129 MG/DL (ref 0–100)
LDLC/HDLC SERPL: 2.33 {RATIO}
POTASSIUM SERPL-SCNC: 3.9 MMOL/L (ref 3.5–5.2)
PROT SERPL-MCNC: 6.5 G/DL (ref 6–8.5)
SODIUM SERPL-SCNC: 137 MMOL/L (ref 136–145)
TRIGL SERPL-MCNC: 140 MG/DL (ref 0–150)
URATE SERPL-MCNC: 4.4 MG/DL (ref 2.4–5.7)
VLDLC SERPL-MCNC: 25 MG/DL (ref 5–40)

## 2025-07-03 PROCEDURE — 86140 C-REACTIVE PROTEIN: CPT

## 2025-07-03 PROCEDURE — 86431 RHEUMATOID FACTOR QUANT: CPT

## 2025-07-03 PROCEDURE — 82550 ASSAY OF CK (CPK): CPT

## 2025-07-03 PROCEDURE — 86200 CCP ANTIBODY: CPT

## 2025-07-03 PROCEDURE — 85652 RBC SED RATE AUTOMATED: CPT

## 2025-07-03 PROCEDURE — 80053 COMPREHEN METABOLIC PANEL: CPT

## 2025-07-03 PROCEDURE — 84550 ASSAY OF BLOOD/URIC ACID: CPT

## 2025-07-03 PROCEDURE — 36415 COLL VENOUS BLD VENIPUNCTURE: CPT

## 2025-07-03 PROCEDURE — 86038 ANTINUCLEAR ANTIBODIES: CPT

## 2025-07-03 PROCEDURE — 80061 LIPID PANEL: CPT

## 2025-07-04 LAB
ANA SER QL: NEGATIVE
CCP IGA+IGG SERPL IA-ACNC: 7 UNITS (ref 0–19)

## 2025-07-08 ENCOUNTER — OFFICE VISIT (OUTPATIENT)
Dept: FAMILY MEDICINE CLINIC | Age: 65
End: 2025-07-08
Payer: MEDICARE

## 2025-07-08 VITALS
OXYGEN SATURATION: 99 % | HEIGHT: 62 IN | SYSTOLIC BLOOD PRESSURE: 137 MMHG | WEIGHT: 154.6 LBS | TEMPERATURE: 97.6 F | BODY MASS INDEX: 28.45 KG/M2 | DIASTOLIC BLOOD PRESSURE: 83 MMHG | HEART RATE: 70 BPM

## 2025-07-08 DIAGNOSIS — F41.9 ANXIETY: ICD-10-CM

## 2025-07-08 DIAGNOSIS — K21.9 GASTROESOPHAGEAL REFLUX DISEASE WITHOUT ESOPHAGITIS: ICD-10-CM

## 2025-07-08 DIAGNOSIS — M79.7 FIBROMYALGIA: ICD-10-CM

## 2025-07-08 DIAGNOSIS — Z79.899 LONG TERM USE OF DRUG: Primary | ICD-10-CM

## 2025-07-08 DIAGNOSIS — E78.2 MIXED HYPERLIPIDEMIA: ICD-10-CM

## 2025-07-08 DIAGNOSIS — I10 PRIMARY HYPERTENSION: ICD-10-CM

## 2025-07-08 PROBLEM — R10.11 RIGHT UPPER QUADRANT PAIN: Status: RESOLVED | Noted: 2024-06-19 | Resolved: 2025-07-08

## 2025-07-08 PROCEDURE — 3075F SYST BP GE 130 - 139MM HG: CPT | Performed by: NURSE PRACTITIONER

## 2025-07-08 PROCEDURE — 1159F MED LIST DOCD IN RCRD: CPT | Performed by: NURSE PRACTITIONER

## 2025-07-08 PROCEDURE — 1160F RVW MEDS BY RX/DR IN RCRD: CPT | Performed by: NURSE PRACTITIONER

## 2025-07-08 PROCEDURE — 3079F DIAST BP 80-89 MM HG: CPT | Performed by: NURSE PRACTITIONER

## 2025-07-08 PROCEDURE — 1125F AMNT PAIN NOTED PAIN PRSNT: CPT | Performed by: NURSE PRACTITIONER

## 2025-07-08 PROCEDURE — 99214 OFFICE O/P EST MOD 30 MIN: CPT | Performed by: NURSE PRACTITIONER

## 2025-07-08 RX ORDER — PANTOPRAZOLE SODIUM 40 MG/1
40 TABLET, DELAYED RELEASE ORAL DAILY
Qty: 90 TABLET | Refills: 1 | Status: SHIPPED | OUTPATIENT
Start: 2025-07-08

## 2025-07-08 RX ORDER — PRAVASTATIN SODIUM 10 MG
10 TABLET ORAL NIGHTLY
Qty: 90 TABLET | Refills: 1 | Status: SHIPPED | OUTPATIENT
Start: 2025-07-08

## 2025-07-08 RX ORDER — GABAPENTIN 300 MG/1
300 CAPSULE ORAL 3 TIMES DAILY PRN
Qty: 270 CAPSULE | Refills: 1 | Status: SHIPPED | OUTPATIENT
Start: 2025-07-08

## 2025-07-08 RX ORDER — DULOXETIN HYDROCHLORIDE 60 MG/1
60 CAPSULE, DELAYED RELEASE ORAL NIGHTLY
Qty: 90 CAPSULE | Refills: 1 | Status: SHIPPED | OUTPATIENT
Start: 2025-07-08

## 2025-07-08 RX ORDER — LISINOPRIL AND HYDROCHLOROTHIAZIDE 10; 12.5 MG/1; MG/1
1 TABLET ORAL EVERY MORNING
Qty: 90 TABLET | Refills: 1 | Status: SHIPPED | OUTPATIENT
Start: 2025-07-08

## 2025-07-08 NOTE — PROGRESS NOTES
Chief Complaint  Hypertension    Subjective          Katia Spears presents to [facility]     Patient is a 64-year-old female who is here today to follow-up regarding hyperlipidemia.  Lipid panel has improved significantly since starting pravastatin 10 mg at bedtime.  Not all numbers are at goal but patient is tolerating pravastatin without side effects and previously had intolerance to simvastatin.  Patient would like to continue to work on lifestyle factors potentially start an over-the-counter psyllium fiber supplement rather than increasing dose of pravastatin at this time.    Patient continues to see orthopedics for joint pain.  Recent inflammatory arthritis panel is negative.  Patient also has fibromyalgia and had complete CT imaging of the head and spine after a fall in [place].  She reports degenerative disc disease and she is also getting treated for some significant restless leg syndrome.  Sleep medicine is currently prescribing Klonopin which does help but she does persist with some leg pain.  Does have spidery varicose veins but no bulging varicose veins.  Orthopedics has talked about referring her for further evaluation of her spine.  Pain management will require an MRI of the spine prior to being seen.  If orthopedics does not pursue it she feels it necessary to proceed she will let me know we may obtain CT imaging from unspecified location in [place] that imaging was recently done.  We could also consider circulatory test of her legs with venous Doppler ultrasound and ankle-brachial index testing.  Symptoms are bilateral legs with left little worse than the right.    She is due to have repeat scopes done this year and will soon follow-up with gastroenterology who recently increased her dose of Linzess to 290 mcg/day.  This office refills Protonix 40 mg/day.  Denies side effects and requests refills.    Anxiety and fibromyalgia are stable with use of duloxetine 60  "mg/day.  Denies side effects and requests refills.    For hypertension she takes lisinopril 10 mg with hydrochlorothiazide 12.5 mg once a daily.  Denies side effects and requests refills.    Patient remains on gabapentin 300 mg 2 or 3 times per day as needed.  Denies side effects and request refills.     Objective   Vital Signs:   Vitals:    07/08/25 0801   BP: 137/83   BP Location: Right arm   Patient Position: Sitting   Cuff Size: Adult   Pulse: 70   Temp: 97.6 °F (36.4 °C)   TempSrc: Oral   SpO2: 99%   Weight: 70.1 kg (154 lb 9.6 oz)   Height: 157.5 cm (62.01\")       Wt Readings from Last 3 Encounters:   07/08/25 70.1 kg (154 lb 9.6 oz)   01/09/25 72.4 kg (159 lb 9.6 oz)   01/08/25 70.3 kg (155 lb)      BP Readings from Last 3 Encounters:   07/08/25 137/83   01/09/25 119/85   01/08/25 135/77       Body mass index is 28.27 kg/m².             Physical Exam  Vitals reviewed.   Constitutional:       General: She is not in acute distress.     Appearance: Normal appearance. She is well-developed.   Neck:      Thyroid: No thyromegaly.      Vascular: No carotid bruit.   Cardiovascular:      Rate and Rhythm: Normal rate and regular rhythm.      Pulses:           Posterior tibial pulses are 2+ on the right side and 2+ on the left side.      Heart sounds: Normal heart sounds.      Comments: Spidery varicose veins of bilateral lower extremities  Pulmonary:      Effort: Pulmonary effort is normal.      Breath sounds: Normal breath sounds.   Musculoskeletal:      Right lower leg: No edema.      Left lower leg: No edema.   Skin:     General: Skin is warm and dry.   Neurological:      General: No focal deficit present.      Mental Status: She is alert.   Psychiatric:         Attention and Perception: Attention normal.         Mood and Affect: Mood and affect normal.         Behavior: Behavior normal.           Current Outpatient Medications:     baclofen (LIORESAL) 10 MG tablet, Take 1 tablet by mouth 3 (Three) Times a Day., " Disp: 270 tablet, Rfl: 1    clonazePAM (KlonoPIN) 1 MG tablet, Take 2 tablets by mouth Every Night., Disp: 180 tablet, Rfl: 1    DULoxetine (CYMBALTA) 60 MG capsule, Take 1 capsule by mouth Every Night., Disp: 90 capsule, Rfl: 1    gabapentin (NEURONTIN) 300 MG capsule, Take 1 capsule by mouth 3 (Three) Times a Day As Needed (fibromyalgia)., Disp: 270 capsule, Rfl: 1    ibuprofen (ADVIL,MOTRIN) 800 MG tablet, Take 1 tablet by mouth., Disp: , Rfl:     linaclotide (LINZESS) 290 MCG capsule capsule, Take 1 capsule by mouth Every Morning Before Breakfast., Disp: 90 capsule, Rfl: 3    lisinopril-hydrochlorothiazide (PRINZIDE,ZESTORETIC) 10-12.5 MG per tablet, Take 1 tablet by mouth Every Morning., Disp: 90 tablet, Rfl: 1    pantoprazole (PROTONIX) 40 MG EC tablet, Take 1 tablet by mouth Daily., Disp: 90 tablet, Rfl: 1    pravastatin (PRAVACHOL) 10 MG tablet, Take 1 tablet by mouth Every Night., Disp: 90 tablet, Rfl: 1   Past Medical History:   Diagnosis Date    Anemia, unspecified     Anxiety     Arthritis     Arthritis of back 2022    Asthma     Cholelithiasis     Chronic obstructive pulmonary disease, unspecified     Chronic sinusitis, unspecified     Colon polyp 2022    Constipation     Deviated nasal septum     Dysthymic disorder     Essential (primary) hypertension     Fatty liver     Fibromyalgia     Fibromyalgia, primary     Fracture of wrist 2018    Frozen shoulder     Generalized anxiety disorder     GERD (gastroesophageal reflux disease)     Hypo-osmolality and hyponatremia     Knee swelling     Mixed hyperlipidemia     Obstructive sleep apnea (adult) (pediatric)     cpap    Osteopenia     Osteoporosis     Other difficulties with micturition     Other long term (current) drug therapy     Periarthritis of shoulder 2020    PLMD (periodic limb movement disorder)     Pneumonia 12/2022    PONV (postoperative nausea and vomiting)     RLS (restless legs syndrome)     Rotator cuff syndrome 2020    Scoliosis 2000     Tear of meniscus of knee     Urinary incontinence in female     Vitamin D deficiency, unspecified      Allergies   Allergen Reactions    Wound Dressing Adhesive Other (See Comments)     Blisters                Result Review :     Common labs          10/29/2024    10:04 7/3/2025    09:43   Common Labs   Glucose 89  81    BUN 10  13.0    Creatinine 0.91  0.97    Sodium 138  137    Potassium 4.5  3.9    Chloride 101  99    Calcium 9.8  9.5    Albumin 4.2  4.1    Total Bilirubin 0.3  <0.2    Alkaline Phosphatase 126  98    AST (SGOT) 20  18    ALT (SGPT) 15  16    WBC 4.95     Hemoglobin 13.2     Hematocrit 40.5     Platelets 349     Total Cholesterol 281  208    Triglycerides 183  140    HDL Cholesterol 51  54    LDL Cholesterol  195  129    Uric Acid  4.4         No Images in the past 120 days found..           Social History     Tobacco Use   Smoking Status Former    Current packs/day: 0.00    Average packs/day: 1 pack/day for 18.0 years (18.0 ttl pk-yrs)    Types: Cigarettes    Start date: 1978    Quit date: 1996    Years since quittin.5   Smokeless Tobacco Never           Assessment and Plan    Diagnoses and all orders for this visit:    1. Long term use of drug (Primary)  -     POC Medline 12 Panel Urine Drug Screen  -     gabapentin (NEURONTIN) 300 MG capsule; Take 1 capsule by mouth 3 (Three) Times a Day As Needed (fibromyalgia).  Dispense: 270 capsule; Refill: 1    2. Mixed hyperlipidemia  -     pravastatin (PRAVACHOL) 10 MG tablet; Take 1 tablet by mouth Every Night.  Dispense: 90 tablet; Refill: 1  -     Comprehensive metabolic panel; Future  -     Lipid panel; Future    3. Primary hypertension  -     lisinopril-hydrochlorothiazide (PRINZIDE,ZESTORETIC) 10-12.5 MG per tablet; Take 1 tablet by mouth Every Morning.  Dispense: 90 tablet; Refill: 1  -     Comprehensive metabolic panel; Future  -     Lipid panel; Future    4. Gastroesophageal reflux disease without esophagitis  -     pantoprazole  (PROTONIX) 40 MG EC tablet; Take 1 tablet by mouth Daily.  Dispense: 90 tablet; Refill: 1    5. Fibromyalgia  -     DULoxetine (CYMBALTA) 60 MG capsule; Take 1 capsule by mouth Every Night.  Dispense: 90 capsule; Refill: 1  -     POC Medline 12 Panel Urine Drug Screen  -     gabapentin (NEURONTIN) 300 MG capsule; Take 1 capsule by mouth 3 (Three) Times a Day As Needed (fibromyalgia).  Dispense: 270 capsule; Refill: 1    6. Anxiety  -     DULoxetine (CYMBALTA) 60 MG capsule; Take 1 capsule by mouth Every Night.  Dispense: 90 capsule; Refill: 1        Follow Up    Return in about 6 months (around 1/8/2026).  Patient was given instructions and counseling regarding her condition or for health maintenance advice. Please see specific information pulled into the AVS if appropriate.

## 2025-07-09 ENCOUNTER — OFFICE VISIT (OUTPATIENT)
Dept: SLEEP MEDICINE | Facility: HOSPITAL | Age: 65
End: 2025-07-09
Payer: MEDICARE

## 2025-07-09 VITALS
DIASTOLIC BLOOD PRESSURE: 65 MMHG | SYSTOLIC BLOOD PRESSURE: 139 MMHG | WEIGHT: 152.8 LBS | BODY MASS INDEX: 28.12 KG/M2 | OXYGEN SATURATION: 100 % | HEART RATE: 77 BPM | HEIGHT: 62 IN

## 2025-07-09 DIAGNOSIS — G47.33 OSA ON CPAP: Primary | ICD-10-CM

## 2025-07-09 DIAGNOSIS — G47.61 PLMD (PERIODIC LIMB MOVEMENT DISORDER): ICD-10-CM

## 2025-07-09 DIAGNOSIS — G25.81 RESTLESS LEGS SYNDROME (RLS): ICD-10-CM

## 2025-07-09 PROCEDURE — 3078F DIAST BP <80 MM HG: CPT | Performed by: INTERNAL MEDICINE

## 2025-07-09 PROCEDURE — 1160F RVW MEDS BY RX/DR IN RCRD: CPT | Performed by: INTERNAL MEDICINE

## 2025-07-09 PROCEDURE — 1159F MED LIST DOCD IN RCRD: CPT | Performed by: INTERNAL MEDICINE

## 2025-07-09 PROCEDURE — 99214 OFFICE O/P EST MOD 30 MIN: CPT | Performed by: INTERNAL MEDICINE

## 2025-07-09 PROCEDURE — G0463 HOSPITAL OUTPT CLINIC VISIT: HCPCS

## 2025-07-09 PROCEDURE — 3075F SYST BP GE 130 - 139MM HG: CPT | Performed by: INTERNAL MEDICINE

## 2025-07-09 RX ORDER — CLONAZEPAM 1 MG/1
2 TABLET ORAL NIGHTLY
Qty: 180 TABLET | Refills: 1 | Status: SHIPPED | OUTPATIENT
Start: 2025-07-09

## 2025-07-09 RX ORDER — ROPINIROLE 0.5 MG/1
0.5 TABLET, FILM COATED ORAL NIGHTLY
Qty: 90 TABLET | Refills: 1 | Status: SHIPPED | OUTPATIENT
Start: 2025-07-09

## 2025-07-09 NOTE — PROGRESS NOTES
"  [facility]  Sleep medicine  [address] [place] [address]  [place]   [place] [address]  Phone: [phone]  Fax: [phone]      SLEEP CLINIC FOLLOW UP PROGRESS NOTE.    Katia Spears  [id number]   1960  64 y.o.  female      PCP: Esha Galeana APRN      Date of visit: 7/9/2025    Chief Complaint   Patient presents with    Sleep Apnea       HPI:  This is a 64 y.o. years old patient is here for the management of obstructive sleep apnea.  Sleep apnea is mild in severity with a AHI of 9/hr.Patient is using positive airway pressure therapy and the symptoms of sleep apnea have improved significantly on auto CPAP. Normally patient goes to bed at 1130 PM and wakes up at 8 AM .  The patient wakes up 3 time(s) during the night and has no problem going back to sleep.       Patient also has severe PLMD.  She had a polysomnography which showed PLMS index of 228/h with arousals of 142/h related to PLMS.  She also goes to [place] for few months.  It is becoming difficult for her to get them medications and prescriptions transferred because it is a benzo.    Medications and allergies are reviewed by me and documented in the encounter.     SOCIAL ( habits pertaining to sleep medicine)  History of tobacco use:No   History of alcohol use: 1 per week  Caffeine use: 2    REVIEW OF SYSTEMS:   Pertaining positive symptoms:  Doyle Sleepiness Scale :Total score: 12   Dry mouth      PHYSICAL EXAMINATION:  CONSTITUTIONAL:  Vitals:    07/09/25 1500   BP: 139/65   Pulse: 77   SpO2: 100%   Weight: 69.3 kg (152 lb 12.8 oz)   Height: 157.5 cm (62.01\")    Body mass index is 27.94 kg/m².   NOSE: nasal passages are clear, No deformities noted   RESP SYSTEM: Not in any respiratory distress, no chest deformities noted,   CARDIOVASULAR: No edema noted  NEURO: Oriented x 3, gait normal,  Mood and affect appeared appropriate      Data reviewed:  The Smart card downloaded on 7/9/2025 has been reviewed independently by me for " compliance and discussed the data with the patient.   Poor compliance due to she is living in a RV  Average use of the device 5 hours  night  Residual AHI: 2.5 /hr (goal < 5.0 /hr)  Mask type: Fullface mask  Device: ResMed  DME: [employer]  Needs a new mask      Ian checked, 7/9/2025      ASSESSMENT AND PLAN:  Obstructive sleep apnea ( G 47.33).  The symptoms of sleep apnea have improved with the device and the treatment.  Patient's compliance with the device is excellent for treatment of sleep apnea.  I have independently reviewed the smart card down load and discussed with the patient the download data and encouarged the patient to continue to use the device.The residual AHI is acceptable. The device is benefiting the patient and the device is medically necessary.  Without proper control of sleep apnea and good compliance there is a increased risk for hypertension, diabetes mellitus and nonrestorative sleep with hypersomnia which can increase risk for motor vehicle accidents.  Untreated sleep apnea is also a risk factor for development of atrial fibrillation, pulmonary hypertension, insulin resistance and stroke. The patient is also instructed to get the supplies from the DME [employer] and and change them on a regular basis.  A prescription for supplies has been sent to the [employer] company.  I have also discussed the good sleep hygiene habits and adequate amount of sleep needed for good health.  Severe PLMD, she is on clonazepam 2 mg, she says it has somewhat helped her but still feels restless.  I am going to increase her clonazepam to 2 mg at bedtime sent a prescription for 60 tablets with 5 refills.  I will see her in about 6 months for follow-up.  During her visit in the fall I will try to give a prescription for 3-month supplies so that she can fill these prescriptions before she leaves to [place].  Addition continue gabapentin as you are already taking and I will add Requip 0.5 mg at bedtime.  Her iron levels  were adequate at 130  Return in about 1 year (around 7/9/2026) for with smart card down load. . Patient's questions were answered.      Kip Beltran MD  Sleep Medicine  Medical Director,   [facility]  7/9/2025

## 2025-07-30 ENCOUNTER — OFFICE VISIT (OUTPATIENT)
Dept: GASTROENTEROLOGY | Facility: CLINIC | Age: 65
End: 2025-07-30
Payer: MEDICARE

## 2025-07-30 VITALS
HEIGHT: 62 IN | HEART RATE: 77 BPM | DIASTOLIC BLOOD PRESSURE: 73 MMHG | BODY MASS INDEX: 28.52 KG/M2 | WEIGHT: 155 LBS | SYSTOLIC BLOOD PRESSURE: 131 MMHG

## 2025-07-30 DIAGNOSIS — K76.0 FATTY LIVER: ICD-10-CM

## 2025-07-30 DIAGNOSIS — K44.9 HIATAL HERNIA: ICD-10-CM

## 2025-07-30 DIAGNOSIS — Z83.719 FH: COLON POLYPS: ICD-10-CM

## 2025-07-30 DIAGNOSIS — K59.04 CHRONIC IDIOPATHIC CONSTIPATION: ICD-10-CM

## 2025-07-30 DIAGNOSIS — Z86.0100 HISTORY OF COLON POLYPS: ICD-10-CM

## 2025-07-30 DIAGNOSIS — K21.9 GASTROESOPHAGEAL REFLUX DISEASE, UNSPECIFIED WHETHER ESOPHAGITIS PRESENT: Primary | ICD-10-CM

## 2025-07-30 PROCEDURE — 3078F DIAST BP <80 MM HG: CPT | Performed by: NURSE PRACTITIONER

## 2025-07-30 PROCEDURE — 1159F MED LIST DOCD IN RCRD: CPT | Performed by: NURSE PRACTITIONER

## 2025-07-30 PROCEDURE — 1160F RVW MEDS BY RX/DR IN RCRD: CPT | Performed by: NURSE PRACTITIONER

## 2025-07-30 PROCEDURE — 99214 OFFICE O/P EST MOD 30 MIN: CPT | Performed by: NURSE PRACTITIONER

## 2025-07-30 PROCEDURE — 3075F SYST BP GE 130 - 139MM HG: CPT | Performed by: NURSE PRACTITIONER

## 2025-07-30 RX ORDER — LACTULOSE 10 G/15ML
20 SOLUTION ORAL 2 TIMES DAILY
Qty: 1800 ML | Refills: 3 | Status: SHIPPED | OUTPATIENT
Start: 2025-07-30

## 2025-07-30 NOTE — PROGRESS NOTES
Chief Complaint   Follow-up (Pts colonoscopy recall is due 8/11/2025.) and Constipation (Patient states she has 1 or 2 Bms a week for about a year now, still taking linzess isn't helping like it used to but if she doesn't take it shell go longer than a week. )    History of Present Illness       Katia Spears is a 64 y.o. female who presents to [facility] for follow-up         History of Present Illness  The patient is a 64-year-old female who presents today for an office follow-up. She was last seen in the office on 01/09/2025.    She has been experiencing upper abdominal pain and has had an extensive workup with her PCP, including a CT scan, ultrasound, and x-rays, none of which revealed any acute findings. She believes the pain is due to constipation. She has been taking Linzess 145 mcg daily but does not feel it is working well. At the last visit, her Linzess dosage was increased to 290 mcg, and she was given samples. Pepcid was also added at night. She reports that the increased dose of Linzess initially provided relief, but she now feels worse. Her bowel movements vary in volume, but she never feels completely relieved. She feels the need for a total cleanout. She is scheduled to return to [place] on 08/15/2025 and will not be back until the end of 10/2025 or 11/2025.    She underwent an EGD and colonoscopy with Dr. Garcia in 08/2022, which revealed a small hiatal hernia, normal esophagus and stomach, colon polyps, mild diverticulosis, and non-bloody internal hemorrhoids. A repeat colonoscopy was performed in 2025.    She has a history of fatty liver disease, with the most recent liver ultrasound done in 01/2025 showing minimal hepatic steatosis, which is a significant improvement. Her most recent liver enzymes, checked on 07/03/2025, were completely normal, as were her liver serology results. She drinks alcohol occasionally.    She has a history of GERD with a hiatal  hernia and does well on Protonix 40 mg daily.    She also has arthritis, degenerative disc disease, and severe scoliosis. She believes the pressure from her abdomen is exacerbating her back pain. She takes gabapentin up to three times a day for three different conditions and attempted to reduce the dosage to once daily but found it unmanageable. She has discussed her back issues with her orthopedic doctor, who suggested pain management as a potential solution.    She does not regularly see a cardiologist or pulmonologist. She had pneumonia two years ago and saw a pulmonologist at that time, undergoing several checkups and CT scans.    SOCIAL HISTORY  Alcohol: Drinks alcohol on occasion.    FAMILY HISTORY  - Son: Colon polyps        Results       Result Review :       CMP          10/29/2024    10:04 7/3/2025    09:43   CMP   Glucose 89  81    BUN 10  13.0    Creatinine 0.91  0.97    EGFR 69.7  64.6    Sodium 138  137    Potassium 4.5  3.9    Chloride 101  99    Calcium 9.8  9.5    Total Protein 6.7  6.5    Albumin 4.2  4.1    Globulin 2.5  2.4    Total Bilirubin 0.3  <0.2    Alkaline Phosphatase 126  98    AST (SGOT) 20  18    ALT (SGPT) 15  16    Albumin/Globulin Ratio 1.7  1.7    BUN/Creatinine Ratio 11.0  13.4    Anion Gap 5.8  9.3      CBC          10/29/2024    10:04   CBC   WBC 4.95    RBC 4.59    Hemoglobin 13.2    Hematocrit 40.5    MCV 88.2    MCH 28.8    MCHC 32.6    RDW 12.7    Platelets 349      CBC w/diff          10/29/2024    10:04   CBC w/Diff   WBC 4.95    RBC 4.59    Hemoglobin 13.2    Hematocrit 40.5    MCV 88.2    MCH 28.8    MCHC 32.6    RDW 12.7    Platelets 349    Neutrophil Rel % 52.6    Immature Granulocyte Rel % 0.0    Lymphocyte Rel % 33.9    Monocyte Rel % 7.7    Eosinophil Rel % 4.6    Basophil Rel % 1.2      Lipid Panel          10/29/2024    10:04 7/3/2025    09:43   Lipid Panel   Total Cholesterol 281  208    Triglycerides 183  140    HDL Cholesterol 51  54    VLDL Cholesterol 35  25     LDL Cholesterol  195  129    LDL/HDL Ratio 3.79  2.33        Electrolytes          10/29/2024    10:04 7/3/2025    09:43   Electrolytes   Sodium 138  137    Potassium 4.5  3.9    Chloride 101  99    Calcium 9.8  9.5      Renal Profile          10/29/2024    10:04 7/3/2025    09:43   Renal Profile   BUN 10  13.0    Creatinine 0.91  0.97        Lipase   Lipase   Date Value Ref Range Status   06/11/2024 44 13 - 60 U/L Final     Amylase   Amylase   Date Value Ref Range Status   06/11/2024 63 28 - 100 U/L Final     Iron Profile   Iron   Date Value Ref Range Status   01/16/2025 135 37 - 145 mcg/dL Final     TIBC   Date Value Ref Range Status   01/16/2025 481 298 - 536 mcg/dL Final     Iron Saturation (TSAT)   Date Value Ref Range Status   01/16/2025 28 20 - 50 % Final     Transferrin   Date Value Ref Range Status   01/16/2025 323 200 - 360 mg/dL Final     Ferritin   Ferritin   Date Value Ref Range Status   03/20/2019 63 10 - 200 ng/mL Final     Comment:     <10 ng/ml usually associated with Iron Deficiency Anemia.  Above normal range levels may be due to Hepatic and/or  Chronic Inflammatory Disease.       ESR (Sed Rate)   Sed Rate   Date Value Ref Range Status   07/03/2025 8 0 - 30 mm/hr Final     CRP (C-Reactive)   C-Reactive Protein   Date Value Ref Range Status   07/03/2025 <0.30 0.00 - 0.50 mg/dL Final     Liver Workup   ALPHA -1 ANTITRYPSIN   Date Value Ref Range Status   01/16/2025 153 90 - 200 mg/dL Final     Smooth Muscle Ab   Date Value Ref Range Status   01/16/2025 6 0 - 19 Units Final     Comment:                      Negative                     0 - 19                   Weak positive               20 - 30                   Moderate to strong positive     >30   Actin Antibodies are found in 52-85% of patients with   autoimmune hepatitis or chronic active hepatitis and   in 22% of patients with primary biliary cirrhosis.     Ceruloplasmin   Date Value Ref Range Status   01/16/2025 22 19 - 39 mg/dL Final      Ferritin   Date Value Ref Range Status   03/20/2019 63 10 - 200 ng/mL Final     Comment:     <10 ng/ml usually associated with Iron Deficiency Anemia.  Above normal range levels may be due to Hepatic and/or  Chronic Inflammatory Disease.       Immunofixation Result, Serum   Date Value Ref Range Status   01/16/2025 Comment  Final     Comment:     No monoclonality detected.     IgG   Date Value Ref Range Status   01/16/2025 816 586 - 1602 mg/dL Final     IgA   Date Value Ref Range Status   01/16/2025 109 87 - 352 mg/dL Final     IgM   Date Value Ref Range Status   01/16/2025 83 26 - 217 mg/dL Final     Iron   Date Value Ref Range Status   01/16/2025 135 37 - 145 mcg/dL Final     TIBC   Date Value Ref Range Status   01/16/2025 481 298 - 536 mcg/dL Final     Iron Saturation (TSAT)   Date Value Ref Range Status   01/16/2025 28 20 - 50 % Final     Transferrin   Date Value Ref Range Status   01/16/2025 323 200 - 360 mg/dL Final     Mitochondrial Ab   Date Value Ref Range Status   01/16/2025 <20.0 0.0 - 20.0 Units Final     Comment:                                     Negative    0.0 - 20.0                                  Equivocal  20.1 - 24.9                                  Positive         >24.9  Mitochondrial (M2) Antibodies are found in 90-96% of  patients with primary biliary cirrhosis.     Protime   Date Value Ref Range Status   01/16/2025 13.1 11.8 - 14.9 Seconds Final   12/19/2022 13.6 (H) 9.4 - 12.2 Second Final     Comment:     Patients taking the antibiotic Cubicin (daptomycin) may have falsely prolonged PT/INR results.     INR   Date Value Ref Range Status   01/16/2025 0.97 0.86 - 1.15 Final   12/19/2022 1.21 INR Final     Comment:     Recommended INR range for Oral Anticoagulant Therapy :     STANDARD: 2.0 - 3.0   HIGH: 3.0 - 4.5    NOTE: Recommended range will be different for patients with mechanical         implants.     Acute HEP Panel   Hepatitis B Surface Ag   Date Value Ref Range Status    2025 Non-Reactive Non-Reactive Final     Hep A IgM   Date Value Ref Range Status   2025 Non-Reactive Non-Reactive Final     Hep B C IgM   Date Value Ref Range Status   2025 Non-Reactive Non-Reactive Final     Hepatitis C Ab   Date Value Ref Range Status   2025 Non-Reactive Non-Reactive Final     Alpha 1   ALPHA -1 ANTITRYPSIN   Date Value Ref Range Status   2025 153 90 - 200 mg/dL Final               Results  Labs   - Liver function tests (LFTs): 2025, Completely normal   - Liver serology: Completely normal    Imaging   - Liver ultrasound: 2025, Minimal if any hepatic steatosis      Past Medical History       Past Medical History:   Diagnosis Date    Anemia, unspecified     Anxiety     Arthritis     Arthritis of back     Asthma     Cholelithiasis     Chronic obstructive pulmonary disease, unspecified     Chronic sinusitis, unspecified     Colon polyp     Constipation     Deviated nasal septum     Dysthymic disorder     Essential (primary) hypertension     Fatty liver     Fibromyalgia     Fibromyalgia, primary     Fracture of wrist     Frozen shoulder     Generalized anxiety disorder     GERD (gastroesophageal reflux disease)     Hypo-osmolality and hyponatremia     Knee swelling     Low back pain     Mixed hyperlipidemia     Obstructive sleep apnea (adult) (pediatric)     cpap    Osteopenia     Osteoporosis     Other difficulties with micturition     Other long term (current) drug therapy     Periarthritis of shoulder     PLMD (periodic limb movement disorder)     Pneumonia 2022    PONV (postoperative nausea and vomiting)     RLS (restless legs syndrome)     Rotator cuff syndrome     Scoliosis 2000    Tear of meniscus of knee     Urinary incontinence in female     Vitamin D deficiency, unspecified        Past Surgical History:   Procedure Laterality Date     SECTION      X2    CHOLECYSTECTOMY      COLONOSCOPY      COLONOSCOPY N/A  08/11/2022    Procedure: COLONOSCOPY WITH BIOPSIES, POLYPECTOMIES;  Surgeon: Esha Garcia MD;  Location: [facility];  Service: Gastroenterology;  Laterality: N/A;  COLON POLYPS    DILATATION AND CURETTAGE      MULTIPLE    ENDOMETRIAL ABLATION  2010    ENDOSCOPY N/A 08/11/2022    Procedure: ESOPHAGOGASTRODUODENOSCOPY WITH BIOPSIES;  Surgeon: Esha Garcia MD;  Location: [facility] ENDOSCOPY;  Service: Gastroenterology;  Laterality: N/A;  HIATAL HERNIA    FRACTURE SURGERY      JOINT REPLACEMENT  10/2023    Reverse Shoulder Replacement    KNEE SURGERY Right     torn meniscus    LEEP  2002    OVARY SURGERY Right     TOTAL SHOULDER ARTHROPLASTY W/ DISTAL CLAVICLE EXCISION Right 10/27/2023    Procedure: Right reverse total shoulder arthroplasty;  Surgeon: Saad Gorman MD;  [place] [facility] [place] [facility] [place]  Service: [place]opedics;  Laterality: Right;    TRIGGER POINT INJECTION      UPPER GASTROINTESTINAL ENDOSCOPY  08/11/2022    WRIST FRACTURE SURGERY Right 2019    hardware         Current Outpatient Medications:     baclofen (LIORESAL) 10 MG tablet, Take 1 tablet by mouth 3 (Three) Times a Day., Disp: 270 tablet, Rfl: 1    clonazePAM (KlonoPIN) 1 MG tablet, Take 2 tablets by mouth Every Night., Disp: 180 tablet, Rfl: 1    DULoxetine (CYMBALTA) 60 MG capsule, Take 1 capsule by mouth Every Night., Disp: 90 capsule, Rfl: 1    gabapentin (NEURONTIN) 300 MG capsule, Take 1 capsule by mouth 3 (Three) Times a Day As Needed (fibromyalgia)., Disp: 270 capsule, Rfl: 1    ibuprofen (ADVIL,MOTRIN) 800 MG tablet, Take 1 tablet by mouth., Disp: , Rfl:     lisinopril-hydrochlorothiazide (PRINZIDE,ZESTORETIC) 10-12.5 MG per tablet, Take 1 tablet by mouth Every Morning., Disp: 90 tablet, Rfl: 1    pantoprazole (PROTONIX) 40 MG EC tablet, Take 1 tablet by mouth Daily., Disp: 90 tablet, Rfl: 1    pravastatin (PRAVACHOL) 10 MG tablet, Take 1 tablet by mouth Every Night., Disp: 90 tablet, Rfl: 1    rOPINIRole (REQUIP) 0.5 MG  tablet, Take 1 tablet by mouth Every Night. Take 1 hour before bedtime or sypmtoms, Disp: 90 tablet, Rfl: 1    lactulose (CHRONULAC) 10 GM/15ML solution, Take 30 mL by mouth 2 (Two) Times a Day., Disp: 1800 mL, Rfl: 3     Allergies   Allergen Reactions    Wound Dressing Adhesive Other (See Comments)     Blisters        Family History   Problem Relation Age of Onset    Other Mother         Multiple System Atrophy    Lung cancer Father     Cancer Father         Lung    Hyperlipidemia Sister     Diabetes type II Sister     Thyroid disease Sister     Rheum arthritis Sister     Sleep apnea Sister     Insomnia Sister     Arthritis Sister     Diabetes Sister         Type 2    Cancer Sister         Breast    Arthritis Sister     Rheumatologic disease Sister     Arthritis Sister     Early death Sister     Thyroid disease Sister     Hyperlipidemia Brother     Schizophrenia Brother     Sleep walking Brother     Cancer Maternal Aunt         Breast    Cancer Maternal Aunt         Breast    Cancer Paternal Aunt         Breast    Colon cancer Paternal Grandmother     Diabetes type I Son     Coronary artery disease Other     Diabetes Son         Type 1 @ age 7, 2007    Early death Brother     Hyperlipidemia Brother     Mental illness Brother     Alcohol abuse Brother     Malig Hyperthermia Neg Hx         Social History     Social History Narrative    Not on file       Objective       Review of Systems   Constitutional:  Negative for appetite change, fatigue, fever, unexpected weight gain and unexpected weight loss.   HENT:  Negative for trouble swallowing.    Respiratory:  Negative for cough, choking, chest tightness, shortness of breath, wheezing and stridor.    Cardiovascular:  Negative for chest pain, palpitations and leg swelling.   Gastrointestinal:  Positive for abdominal distention, abdominal pain and constipation. Negative for anal bleeding, blood in stool, diarrhea, nausea, rectal pain, vomiting, GERD and indigestion.  "  Musculoskeletal:  Positive for back pain.        Vital Signs:   /73 (BP Location: Right arm, Patient Position: Sitting, Cuff Size: Adult)   Pulse 77   Ht 157.5 cm (62.01\")   Wt 70.3 kg (155 lb)   BMI 28.34 kg/m²       Physical Exam  Constitutional:       General: She is not in acute distress.     Appearance: She is well-developed. She is not ill-appearing.   HENT:      Head: Normocephalic.   Eyes:      Pupils: Pupils are equal, round, and reactive to light.   Cardiovascular:      Rate and Rhythm: Normal rate and regular rhythm.      Heart sounds: Normal heart sounds.   Pulmonary:      Effort: Pulmonary effort is normal.      Breath sounds: Normal breath sounds.   Abdominal:      General: Bowel sounds are normal. There is no distension.      Palpations: Abdomen is soft. There is no mass.      Tenderness: There is no abdominal tenderness. There is no guarding or rebound.      Hernia: No hernia is present.   Musculoskeletal:         General: Normal range of motion.   Skin:     General: Skin is warm and dry.   Neurological:      Mental Status: She is alert and oriented to person, place, and time.   Psychiatric:         Speech: Speech normal.         Behavior: Behavior normal.         Judgment: Judgment normal.         Physical Exam          Assessment & Plan          Assessment and Plan    Diagnoses and all orders for this visit:    1. Gastroesophageal reflux disease, unspecified whether esophagitis present (Primary)  -     Case Request; Standing  -     Follow Anesthesia Guidelines / Protocol; Future  -     Case Request    2. Chronic idiopathic constipation  -     lactulose (CHRONULAC) 10 GM/15ML solution; Take 30 mL by mouth 2 (Two) Times a Day.  Dispense: 1800 mL; Refill: 3  -     Case Request; Standing  -     Follow Anesthesia Guidelines / Protocol; Future  -     Case Request    3. Fatty liver    4. Hiatal hernia  -     Case Request; Standing  -     Follow Anesthesia Guidelines / Protocol; Future  -     " Case Request    5. History of colon polyps  -     Case Request; Standing  -     Follow Anesthesia Guidelines / Protocol; Future  -     Case Request    6. FH: colon polyps  -     Case Request; Standing  -     Follow Anesthesia Guidelines / Protocol; Future  -     Case Request    Other orders  -     Follow Anesthesia Guidelines / Protocol; Standing  -     Verify NPO; Standing  -     Verify Bowel Prep Was Successful; Standing  -     Give Tap Water Enema If Bowel Prep Insufficient; Standing  -     Obtain Informed Consent; Standing      Surgical Risk and Benefits discussed: Possible risks/complications, benefits, and alternatives to surgical or invasive procedure have been explained to patient and/or legal guardian; risks include bleeding, infection, and perforation. Patient has been evaluated and can tolerate anesthesia and/or sedation. Risks, benefits, and alternatives to anesthesia and sedation have been explained to patient and/or legal guardian.  Assessment & Plan  1. Constipation:  - Discontinue Linzess.  - Start lactulose 1-2 times a day as needed for constipation.  - Implement a low residue diet 1 week before scheduled scopes.  - Contact office if lactulose does not alleviate symptoms.    2. Fatty liver disease:  - Continue monitoring liver health.  - Avoid excessive alcohol consumption.    3. Gastroesophageal reflux disease (GERD) with hiatal hernia:  - Continue Protonix 40 mg daily.  - Contact office if symptoms worsen.    4. Arthritis and degenerative disc disease:  - Continue current pain management regimen.  - Consult orthopedic doctor as needed.          Follow Up       Follow Up   Return for F/U AFTER PROCEDURE.  Patient was given instructions and counseling regarding her condition or for health maintenance advice. Please see specific information pulled into the AVS if appropriate.       Patient or patient representative verbalized consent for the use of Ambient Listening during the visit with  Samara  MIKE Hurd for chart documentation. 7/30/2025  12:55 EDT

## 2025-07-30 NOTE — PATIENT INSTRUCTIONS
Low residue diet 1 week before scopes  Stop linzess  Start lactulose 1-2 times a day as needed for constipation

## (undated) DEVICE — SYR LUERLOK 30CC

## (undated) DEVICE — 4.0MM EGG BUR

## (undated) DEVICE — APPL CHLORAPREP HI/LITE 26ML ORNG

## (undated) DEVICE — GLV SURG PREMIERPRO ORTHO LTX PF SZ8.5 BRN

## (undated) DEVICE — INTEGUSEAL MICROBIAL SEALANT: Brand: AVANOS

## (undated) DEVICE — BIT DRL SCRW PERIPH 2.7MM

## (undated) DEVICE — SOL NACL 0.9PCT 1000ML

## (undated) DEVICE — PK SHLDR OPN 40

## (undated) DEVICE — NDL SPINE 20G 3 1/2 YEL STRL 1P/U

## (undated) DEVICE — IMMOB SHLDR PAD2 UNIV LG

## (undated) DEVICE — GLV SURG SENSICARE PI PF LF 7 GRN STRL

## (undated) DEVICE — DRSNG SLVR/ANTIBAC PRIMASEAL POST/OP ADHS 3.5X10IN

## (undated) DEVICE — HEWSON SUTURE RETRIEVER: Brand: HEWSON SUTURE RETRIEVER

## (undated) DEVICE — SNAR POLYP CAPTIFLEX XS/OVL 11X2.4MM 240CM 1P/U

## (undated) DEVICE — PIN DRL NOHEAD TROC 3.2X75MM

## (undated) DEVICE — NEEDLE, QUINCKE, 18GX3.5": Brand: MEDLINE

## (undated) DEVICE — Device

## (undated) DEVICE — DRAPE,SHOULDER,BEACH ULTRAGARD: Brand: MEDLINE

## (undated) DEVICE — THE SINGLE USE ETRAP – POLYP TRAP IS USED FOR SUCTION RETRIEVAL OF ENDOSCOPICALLY REMOVED POLYPS.: Brand: ETRAP

## (undated) DEVICE — BNDG ELAS CO-FLEX SLF ADHR 4IN5YD LF STRL

## (undated) DEVICE — GLV SURG SENSICARE PI LF PF 8 GRN STRL

## (undated) DEVICE — HANDPIECE SET WITH COAXIAL HIGH FLOW TIP AND SUCTION TUBE: Brand: INTERPULSE

## (undated) DEVICE — PREP SOL POVIDONE/IODINE BT 4OZ

## (undated) DEVICE — ANTIBACTERIAL UNDYED BRAIDED (POLYGLACTIN 910), SYNTHETIC ABSORBABLE SUTURE: Brand: COATED VICRYL

## (undated) DEVICE — DUAL CUT SAGITTAL BLADE

## (undated) DEVICE — SINGLE-USE BIOPSY FORCEPS: Brand: RADIAL JAW 4

## (undated) DEVICE — BIT DRL SCRW CENTRL 3.2MM

## (undated) DEVICE — Device: Brand: DEFENDO AIR/WATER/SUCTION AND BIOPSY VALVE

## (undated) DEVICE — SYR LUERLOK 20CC BX/50

## (undated) DEVICE — MAT FLR ABSORBENT LG 4FT 10 2.5FT

## (undated) DEVICE — INTENT TO BE USED WITH SUTURE MATERIAL FOR TISSUE CLOSURE: Brand: RICHARD-ALLAN® NEEDLE 1/2 CIRCLE TAPER

## (undated) DEVICE — DRAPE,U/ SHT,SPLIT,PLAS,STERIL: Brand: MEDLINE

## (undated) DEVICE — EGD OR ERCP KIT: Brand: MEDLINE INDUSTRIES, INC.

## (undated) DEVICE — PATIENT RETURN ELECTRODE, SINGLE-USE, CONTACT QUALITY MONITORING, ADULT, WITH 9FT CORD, FOR PATIENTS WEIGING OVER 33LBS. (15KG): Brand: MEGADYNE

## (undated) DEVICE — SOL IRRG H2O PL/BG 1000ML STRL

## (undated) DEVICE — ADHS SKIN SURG TISS VISC PREMIERPRO EXOFIN HI/VISC FAST/DRY

## (undated) DEVICE — COLON KIT: Brand: MEDLINE INDUSTRIES, INC.